# Patient Record
Sex: MALE | Race: WHITE | NOT HISPANIC OR LATINO | Employment: FULL TIME | URBAN - METROPOLITAN AREA
[De-identification: names, ages, dates, MRNs, and addresses within clinical notes are randomized per-mention and may not be internally consistent; named-entity substitution may affect disease eponyms.]

---

## 2017-02-23 ENCOUNTER — ALLSCRIPTS OFFICE VISIT (OUTPATIENT)
Dept: OTHER | Facility: OTHER | Age: 62
End: 2017-02-23

## 2017-03-13 ENCOUNTER — APPOINTMENT (EMERGENCY)
Dept: RADIOLOGY | Facility: HOSPITAL | Age: 62
End: 2017-03-13
Payer: COMMERCIAL

## 2017-03-13 ENCOUNTER — HOSPITAL ENCOUNTER (EMERGENCY)
Facility: HOSPITAL | Age: 62
Discharge: HOME | End: 2017-03-13
Attending: EMERGENCY MEDICINE | Admitting: EMERGENCY MEDICINE
Payer: COMMERCIAL

## 2017-03-13 VITALS
SYSTOLIC BLOOD PRESSURE: 120 MMHG | HEART RATE: 74 BPM | TEMPERATURE: 98.3 F | WEIGHT: 282 LBS | OXYGEN SATURATION: 99 % | RESPIRATION RATE: 16 BRPM | DIASTOLIC BLOOD PRESSURE: 71 MMHG

## 2017-03-13 DIAGNOSIS — R55 SYNCOPE: Primary | ICD-10-CM

## 2017-03-13 DIAGNOSIS — R42 LIGHTHEADEDNESS: ICD-10-CM

## 2017-03-13 LAB
ANION GAP SERPL CALCULATED.3IONS-SCNC: 7 MMOL/L (ref 4–13)
ATRIAL RATE: 74 BPM
BASOPHILS # BLD AUTO: 0.05 THOUSANDS/ΜL (ref 0–0.1)
BASOPHILS NFR BLD AUTO: 1 % (ref 0–1)
BUN SERPL-MCNC: 10 MG/DL (ref 5–25)
CALCIUM SERPL-MCNC: 9.2 MG/DL (ref 8.3–10.1)
CHLORIDE SERPL-SCNC: 104 MMOL/L (ref 100–108)
CO2 SERPL-SCNC: 31 MMOL/L (ref 21–32)
CREAT SERPL-MCNC: 0.73 MG/DL (ref 0.6–1.3)
EOSINOPHIL # BLD AUTO: 0.31 THOUSAND/ΜL (ref 0–0.61)
EOSINOPHIL NFR BLD AUTO: 5 % (ref 0–6)
ERYTHROCYTE [DISTWIDTH] IN BLOOD BY AUTOMATED COUNT: 13.1 % (ref 11.6–15.1)
GFR SERPL CREATININE-BSD FRML MDRD: >60 ML/MIN/1.73SQ M
GLUCOSE SERPL-MCNC: 115 MG/DL (ref 65–140)
HCT VFR BLD AUTO: 44.2 % (ref 36.5–49.3)
HGB BLD-MCNC: 15.3 G/DL (ref 12–17)
HOLD SPECIMEN: NORMAL
LYMPHOCYTES # BLD AUTO: 2.92 THOUSANDS/ΜL (ref 0.6–4.47)
LYMPHOCYTES NFR BLD AUTO: 47 % (ref 14–44)
MCH RBC QN AUTO: 31.5 PG (ref 26.8–34.3)
MCHC RBC AUTO-ENTMCNC: 34.6 G/DL (ref 31.4–37.4)
MCV RBC AUTO: 91 FL (ref 82–98)
MONOCYTES # BLD AUTO: 0.44 THOUSAND/ΜL (ref 0.17–1.22)
MONOCYTES NFR BLD AUTO: 7 % (ref 4–12)
NEUTROPHILS # BLD AUTO: 2.48 THOUSANDS/ΜL (ref 1.85–7.62)
NEUTS SEG NFR BLD AUTO: 40 % (ref 43–75)
NRBC BLD AUTO-RTO: 0 /100 WBCS
P AXIS: 47 DEGREES
PLATELET # BLD AUTO: 202 THOUSANDS/UL (ref 149–390)
PMV BLD AUTO: 10.9 FL (ref 8.9–12.7)
POTASSIUM SERPL-SCNC: 3.8 MMOL/L (ref 3.5–5.3)
PR INTERVAL: 172 MS
QRS AXIS: 26 DEGREES
QRSD INTERVAL: 108 MS
QT INTERVAL: 384 MS
QTC INTERVAL: 426 MS
RBC # BLD AUTO: 4.85 MILLION/UL (ref 3.88–5.62)
SODIUM SERPL-SCNC: 142 MMOL/L (ref 136–145)
SPECIMEN SOURCE: NORMAL
SPECIMEN SOURCE: NORMAL
T WAVE AXIS: 35 DEGREES
TROPONIN I BLD-MCNC: 0.01 NG/ML (ref 0–0.08)
TROPONIN I BLD-MCNC: 0.01 NG/ML (ref 0–0.08)
VENTRICULAR RATE: 74 BPM
WBC # BLD AUTO: 6.21 THOUSAND/UL (ref 4.31–10.16)

## 2017-03-13 PROCEDURE — 80048 BASIC METABOLIC PNL TOTAL CA: CPT | Performed by: EMERGENCY MEDICINE

## 2017-03-13 PROCEDURE — 93005 ELECTROCARDIOGRAM TRACING: CPT | Performed by: EMERGENCY MEDICINE

## 2017-03-13 PROCEDURE — 84484 ASSAY OF TROPONIN QUANT: CPT

## 2017-03-13 PROCEDURE — 96360 HYDRATION IV INFUSION INIT: CPT

## 2017-03-13 PROCEDURE — 85025 COMPLETE CBC W/AUTO DIFF WBC: CPT | Performed by: EMERGENCY MEDICINE

## 2017-03-13 PROCEDURE — 99285 EMERGENCY DEPT VISIT HI MDM: CPT

## 2017-03-13 PROCEDURE — 96361 HYDRATE IV INFUSION ADD-ON: CPT

## 2017-03-13 PROCEDURE — 71020 HB CHEST X-RAY 2VW FRONTAL&LATL: CPT

## 2017-03-13 PROCEDURE — 70450 CT HEAD/BRAIN W/O DYE: CPT

## 2017-03-13 PROCEDURE — 36415 COLL VENOUS BLD VENIPUNCTURE: CPT | Performed by: EMERGENCY MEDICINE

## 2017-03-13 RX ORDER — MECLIZINE HYDROCHLORIDE 25 MG/1
25 TABLET ORAL ONCE
Status: COMPLETED | OUTPATIENT
Start: 2017-03-13 | End: 2017-03-13

## 2017-03-13 RX ORDER — AMLODIPINE BESYLATE 10 MG/1
5 TABLET ORAL DAILY
COMMUNITY
End: 2019-11-14 | Stop reason: HOSPADM

## 2017-03-13 RX ORDER — ACETAMINOPHEN 325 MG/1
650 TABLET ORAL ONCE
Status: COMPLETED | OUTPATIENT
Start: 2017-03-13 | End: 2017-03-13

## 2017-03-13 RX ORDER — TRAMADOL HYDROCHLORIDE 50 MG/1
50 TABLET ORAL DAILY
COMMUNITY
End: 2019-01-03 | Stop reason: ALTCHOICE

## 2017-03-13 RX ORDER — OXYCODONE HYDROCHLORIDE 5 MG/1
5 CAPSULE ORAL DAILY
COMMUNITY
End: 2019-01-03 | Stop reason: ALTCHOICE

## 2017-03-13 RX ORDER — FUROSEMIDE 20 MG/1
20 TABLET ORAL DAILY
COMMUNITY
End: 2019-01-13 | Stop reason: SDUPTHER

## 2017-03-13 RX ORDER — EZETIMIBE 10 MG/1
10 TABLET ORAL DAILY
COMMUNITY
End: 2018-10-19 | Stop reason: SDUPTHER

## 2017-03-13 RX ORDER — MELOXICAM 15 MG/1
15 TABLET ORAL DAILY
COMMUNITY
End: 2019-01-03 | Stop reason: ALTCHOICE

## 2017-03-13 RX ORDER — METOPROLOL SUCCINATE 50 MG/1
50 TABLET, EXTENDED RELEASE ORAL DAILY
COMMUNITY
End: 2018-05-22

## 2017-03-13 RX ORDER — VALSARTAN 320 MG/1
320 TABLET ORAL DAILY
COMMUNITY
End: 2018-05-26 | Stop reason: SDUPTHER

## 2017-03-13 RX ORDER — POTASSIUM CHLORIDE 750 MG/1
10 TABLET, FILM COATED, EXTENDED RELEASE ORAL DAILY
COMMUNITY
End: 2019-01-03 | Stop reason: SDUPTHER

## 2017-03-13 RX ORDER — MECLIZINE HYDROCHLORIDE 25 MG/1
25 TABLET ORAL 3 TIMES DAILY PRN
Qty: 30 TABLET | Refills: 0 | Status: SHIPPED | OUTPATIENT
Start: 2017-03-13 | End: 2019-01-03 | Stop reason: ALTCHOICE

## 2017-03-13 RX ORDER — METHOCARBAMOL 750 MG/1
750 TABLET, FILM COATED ORAL DAILY
COMMUNITY
End: 2019-01-03 | Stop reason: ALTCHOICE

## 2017-03-13 RX ADMIN — SODIUM CHLORIDE 1000 ML: 0.9 INJECTION, SOLUTION INTRAVENOUS at 08:29

## 2017-03-13 RX ADMIN — ACETAMINOPHEN 650 MG: 325 TABLET, FILM COATED ORAL at 12:21

## 2017-03-13 RX ADMIN — MECLIZINE HYDROCHLORIDE 25 MG: 25 TABLET ORAL at 09:13

## 2017-03-13 RX ADMIN — MECLIZINE HYDROCHLORIDE 25 MG: 25 TABLET ORAL at 10:53

## 2017-03-15 ENCOUNTER — TRANSCRIBE ORDERS (OUTPATIENT)
Dept: ADMINISTRATIVE | Facility: HOSPITAL | Age: 62
End: 2017-03-15

## 2017-03-15 ENCOUNTER — APPOINTMENT (OUTPATIENT)
Dept: LAB | Facility: CLINIC | Age: 62
End: 2017-03-15
Payer: COMMERCIAL

## 2017-03-15 ENCOUNTER — GENERIC CONVERSION - ENCOUNTER (OUTPATIENT)
Dept: OTHER | Facility: OTHER | Age: 62
End: 2017-03-15

## 2017-03-15 ENCOUNTER — ALLSCRIPTS OFFICE VISIT (OUTPATIENT)
Dept: OTHER | Facility: OTHER | Age: 62
End: 2017-03-15

## 2017-03-15 ENCOUNTER — HOSPITAL ENCOUNTER (OUTPATIENT)
Dept: NON INVASIVE DIAGNOSTICS | Facility: CLINIC | Age: 62
Discharge: HOME | End: 2017-03-15
Payer: COMMERCIAL

## 2017-03-15 DIAGNOSIS — Z91.041 RADIOGRAPHIC DYE ALLERGY STATUS: ICD-10-CM

## 2017-03-15 DIAGNOSIS — R09.89 OTHER SPECIFIED SYMPTOMS AND SIGNS INVOLVING THE CIRCULATORY AND RESPIRATORY SYSTEMS: ICD-10-CM

## 2017-03-15 DIAGNOSIS — R42 DIZZINESS AND GIDDINESS: ICD-10-CM

## 2017-03-15 DIAGNOSIS — R42 DIZZINESS AND GIDDINESS: Primary | ICD-10-CM

## 2017-03-15 LAB — BUN SERPL-MCNC: 9 MG/DL (ref 5–25)

## 2017-03-15 PROCEDURE — 36415 COLL VENOUS BLD VENIPUNCTURE: CPT

## 2017-03-15 PROCEDURE — 93880 EXTRACRANIAL BILAT STUDY: CPT

## 2017-03-15 PROCEDURE — 84520 ASSAY OF UREA NITROGEN: CPT

## 2017-03-27 ENCOUNTER — OFFICE VISIT (OUTPATIENT)
Dept: URGENT CARE | Age: 62
End: 2017-03-27
Payer: COMMERCIAL

## 2017-03-27 PROCEDURE — G0382 LEV 3 HOSP TYPE B ED VISIT: HCPCS

## 2017-04-03 ENCOUNTER — APPOINTMENT (OUTPATIENT)
Dept: LAB | Age: 62
End: 2017-04-03
Payer: COMMERCIAL

## 2017-04-03 ENCOUNTER — GENERIC CONVERSION - ENCOUNTER (OUTPATIENT)
Dept: OTHER | Facility: OTHER | Age: 62
End: 2017-04-03

## 2017-04-03 DIAGNOSIS — I10 ESSENTIAL (PRIMARY) HYPERTENSION: ICD-10-CM

## 2017-04-03 DIAGNOSIS — Z79.899 OTHER LONG TERM (CURRENT) DRUG THERAPY: ICD-10-CM

## 2017-04-03 LAB
ALBUMIN SERPL BCP-MCNC: 3.6 G/DL (ref 3.5–5)
ALP SERPL-CCNC: 83 U/L (ref 46–116)
ALT SERPL W P-5'-P-CCNC: 65 U/L (ref 12–78)
ANION GAP SERPL CALCULATED.3IONS-SCNC: 6 MMOL/L (ref 4–13)
AST SERPL W P-5'-P-CCNC: 40 U/L (ref 5–45)
BILIRUB SERPL-MCNC: 0.44 MG/DL (ref 0.2–1)
BUN SERPL-MCNC: 12 MG/DL (ref 5–25)
CALCIUM SERPL-MCNC: 9.5 MG/DL (ref 8.3–10.1)
CHLORIDE SERPL-SCNC: 105 MMOL/L (ref 100–108)
CO2 SERPL-SCNC: 30 MMOL/L (ref 21–32)
CREAT SERPL-MCNC: 0.72 MG/DL (ref 0.6–1.3)
EST. AVERAGE GLUCOSE BLD GHB EST-MCNC: 128 MG/DL
GFR SERPL CREATININE-BSD FRML MDRD: >60 ML/MIN/1.73SQ M
GLUCOSE SERPL-MCNC: 98 MG/DL (ref 65–140)
HBA1C MFR BLD: 6.1 % (ref 4.2–6.3)
POTASSIUM SERPL-SCNC: 3.5 MMOL/L (ref 3.5–5.3)
PROT SERPL-MCNC: 8 G/DL (ref 6.4–8.2)
SODIUM SERPL-SCNC: 141 MMOL/L (ref 136–145)

## 2017-04-03 PROCEDURE — 80053 COMPREHEN METABOLIC PANEL: CPT

## 2017-04-03 PROCEDURE — 83036 HEMOGLOBIN GLYCOSYLATED A1C: CPT

## 2017-04-03 PROCEDURE — 36415 COLL VENOUS BLD VENIPUNCTURE: CPT

## 2017-04-11 ENCOUNTER — GENERIC CONVERSION - ENCOUNTER (OUTPATIENT)
Dept: OTHER | Facility: OTHER | Age: 62
End: 2017-04-11

## 2017-04-21 ENCOUNTER — ALLSCRIPTS OFFICE VISIT (OUTPATIENT)
Dept: OTHER | Facility: OTHER | Age: 62
End: 2017-04-21

## 2017-04-23 DIAGNOSIS — Z79.899 OTHER LONG TERM (CURRENT) DRUG THERAPY: ICD-10-CM

## 2017-04-23 DIAGNOSIS — I10 ESSENTIAL (PRIMARY) HYPERTENSION: ICD-10-CM

## 2017-05-19 ENCOUNTER — GENERIC CONVERSION - ENCOUNTER (OUTPATIENT)
Dept: OTHER | Facility: OTHER | Age: 62
End: 2017-05-19

## 2017-06-01 ENCOUNTER — ALLSCRIPTS OFFICE VISIT (OUTPATIENT)
Dept: OTHER | Facility: OTHER | Age: 62
End: 2017-06-01

## 2017-06-20 ENCOUNTER — ALLSCRIPTS OFFICE VISIT (OUTPATIENT)
Dept: OTHER | Facility: OTHER | Age: 62
End: 2017-06-20

## 2017-07-06 ENCOUNTER — GENERIC CONVERSION - ENCOUNTER (OUTPATIENT)
Dept: OTHER | Facility: OTHER | Age: 62
End: 2017-07-06

## 2017-07-07 ENCOUNTER — ALLSCRIPTS OFFICE VISIT (OUTPATIENT)
Dept: OTHER | Facility: OTHER | Age: 62
End: 2017-07-07

## 2017-07-13 ENCOUNTER — ALLSCRIPTS OFFICE VISIT (OUTPATIENT)
Dept: OTHER | Facility: OTHER | Age: 62
End: 2017-07-13

## 2017-08-23 ENCOUNTER — ALLSCRIPTS OFFICE VISIT (OUTPATIENT)
Dept: OTHER | Facility: OTHER | Age: 62
End: 2017-08-23

## 2017-10-24 NOTE — PROGRESS NOTES
Assessment  1  Hyperlipidemia (272 4) (E78 5)   2  Benign essential hypertension (401 1) (I10)   3  Morbid obesity (278 01) (E66 01)   4  Prediabetes (790 29) (R73 03)   5  Screen for colon cancer (V76 51) (Z12 11)    Assessment and plan #1 hyperlipidemia recommend a low-cholesterol diet will check a lipid profile #2 hypertension well-controlled continue current medical regimen will continue monitor #3 morbid obesity I would like the patient vomited downside would like the patient exercises routinely I have recommended that he continue to lose weight he is currently using the Nutrisystem which is very helpful for the patient and he is tolerating it well  #4 prediabetes recommended reducing carbohydrates and sweets in her diet, we'll check hemoglobin A1c, fasting sugar and February 2018 #5 colon cancer screening will have the patient see colon rectal Dr Tami Weber for a screening colonoscopy #7 recommend a flu vaccine in the fall RTO in 6 months call if any problems  Plan   Benign essential hypertension, Prediabetes, Screen for colon cancer    · (1) LIPID PANEL, FASTING; Status:Active; Requested for:89Gvr9892;   Need for tetanus booster, Puncture wound of skin from metal nail    · Td  PMH: Mild intermittent asthma without complication    · ProAir  (90 Base) MCG/ACT Inhalation Aerosol Solution; INHALE 2 PUFFS EVERY 4  HOURS AS NEEDED FOR COUGH AND WHEEZE  Prediabetes, Screen for colon cancer    · (1) COMPREHENSIVE METABOLIC PANEL; Status:Active; Requested for:96Awn6007;    · (1) HEMOGLOBIN A1C; Status:Active; Requested for:00Svh3714;     2 - Melquiades Verdugo MD, [facility]  Colorectal Surgery, Gastroenterology Co-Management  *  Status: Hold For - Scheduling  Requested for: 51Mie4357  Ordered;    For: Prediabetes, Screen for colon cancer;  Ordered By: Shannon Armstrong  Performed:   Due: 33RUG2632     Chief Complaint  Chief Complaint Chronic Condition [facility]: Patient is here today for follow up of chronic conditions described in HPI  History of Present Illness  HPI: 27-year-old male who is coming in for a follow-up examination hyperlipidemia, hypertension, obesity, prediabetes; the patient reports me no hospitalizations, no ER visits patient reports me he is compliant taking his medications without untoward side effects patient does need refill of his pro-air  Overall the patient is feeling well is trying to follow a healthy and balanced diet the pt reports he is on nutri system and last 10 lbs no cp takes the med ; 4 week ago pulled muscle of the the chest wall no further sx ; no swimming he is stretching and he is walking      Review of Systems  Complete-Male:   Constitutional: No fever or chills, feels well, no tiredness, no recent weight gain or weight loss  Cardiovascular: No complaints of slow heart rate, no fast heart rate, no chest pain, no palpitations, no leg claudication, no lower extremity  Respiratory: No complaints of shortness of breath, no wheezing, no cough, no SOB on exertion, no orthopnea or PND  Gastrointestinal: No complaints of abdominal pain, no constipation, no nausea or vomiting, no diarrhea or bloody stools  Genitourinary: No complaints of dysuria, no incontinence, no hesitancy, no nocturia, no genital lesion, no testicular pain  ROS Reviewed:   ROS reviewed  Active Problems  1  Abnormal gait (781 2) (R26 9)   2  Abnormal liver enzymes (790 5) (R74 8)   3  Acute maxillary sinusitis (461 0) (J01 00)   4  Acute sinusitis (461 9) (J01 90)   5  Allergic rhinitis (477 9) (J30 9)   6  Arm bruise (923 9) (S40 029A)   7  Arthralgia of knee (719 46) (M25 569)   8  Benign essential hypertension (401 1) (I10)   9  Carotid bruit (785 9) (R09 89)   10  Chronic pain of left knee (505 47,175 96) (M25 562,G89 29)   11  Contrast media allergy (V15 08) (Z91 041)   12  Coronary artery disease (414 00) (I25 10)   13  Cough (786 2) (R05)   14  Dizziness (780 4) (R42)   15  Edema (782 3) (R60 9)   16  Elevated ALT measurement (790 4) (R74 0)   17  Hemorrhoid (455 6) (K64 9)   18  Hepatic steatosis (571 8) (K76 0)   19  Horseshoe kidney (753 3) (Q63 1)   20  Hyperlipidemia (272 4) (E78 5)   21  Indigestion (536 8) (K30)   22  Knee pain, bilateral (719 46) (M25 561,M25 562)   23  Laboratory examination ordered as part of a complete physical examination (V72 62) (Z00 00)   24  Long term use of drug (V58 69) (Z79 899)   25  Microscopic hematuria (599 72) (R31 29)   26  Morbid obesity (278 01) (E66 01)   27  Need for influenza vaccination (V04 81) (Z23)   28  Obesity (278 00) (E66 9)   29  Obstructive sleep apnea (327 23) (G47 33)   30  Prediabetes (790 29) (R73 03)   31  Right-sided lacunar infarction (434 91) (I63 9)   32  Screen for colon cancer (V76 51) (Z12 11)   33  Skin tag (701 9) (L91 8)    Past Medical History  1  History of Allergic rhinitis due to pollen (477 0) (J30 1)   2  History of dizziness (V13 89) (Z87 898)   3  History of vertigo (V12 49) (Z87 898)   4  History of Mild intermittent asthma without complication (882 59) (P55 48)   5  History of Positive PPD (795 51) (R76 11)   6  History of Prediabetes (790 29) (R73 03)  Active Problems And Past Medical History Reviewed: The active problems and past medical history were reviewed and updated today  Surgical History  1  History of Hernia Repair   2  History of Total Knee Replacement Left  Surgical History Reviewed: The surgical history was reviewed and updated today  Family History  Father    1  Family history of Coronary artery disease   2  Family history of cardiac disorder (V17 49) (Z82 49)   3  Family history of hypertension (V17 49) (Z82 49)  Family History Reviewed: The family history was reviewed and updated today  Social History   ·    · Never smoked tobacco (V49 89) (Z78 9)   · No alcohol use   · No drug use   · Two children  Social History Reviewed: The social history was reviewed and updated today   The social history was reviewed and is unchanged  Current Meds   1  AmLODIPine Besylate 10 MG Oral Tablet; take 1 tablet by mouth every day; Therapy: 67VUX6377 to (Evaluate:14Jan2018)  Requested for: 77EKM9958; Last Rx:18Jun2017   Ordered   2  Aspirin 81 MG TABS; TAKS 2 TABLET DAILY; Therapy: (Recorded:57Atx9996) to Recorded   3  Clopidogrel Bisulfate 75 MG Oral Tablet; TAKE 1 TABLET DAILY; Therapy: 21Apr2017 to (Evaluate:18Oct2017)  Requested for: 87Rth1641; Last Rx:21Apr2017   Ordered   4  CoQ-10 100 MG Oral Capsule; takes 1 tablet daily; Therapy: (Recorded:88Lcp8380) to Recorded   5  Fluticasone Propionate 50 MCG/ACT Nasal Suspension; USE 2 SPRAYS IN EACH NOSTRIL ONCE   DAILY; Therapy: 53RIJ6263 to (Evaluate:19Feb2018)  Requested for: 83Gdv4281 Recorded   6  Furosemide 20 MG Oral Tablet; TAKE 1 TABLET DAILY; Therapy: (Recorded:61Ksb3799) to Recorded   7  Klor-Con M10 10 MEQ Oral Tablet Extended Release; TAKE 1 TABLET DAILY; Therapy: (Recorded:45Ksn1916) to Recorded   8  Meclizine HCl TABS; Therapy: (Recorded:27Mar2017) to Recorded   9  Meloxicam 15 MG Oral Tablet; take 1 tablet daily as needed; Therapy: 51LUY9335 to (Evaluate:48Zbz9235)  Requested for: 05FBQ5792; Last Rx:95Rdd2200   Ordered   10  MethylPREDNISolone 4 MG Oral Tablet Therapy Pack; as directed; Therapy: 61DOJ9187 to (Evaluate:38Dku9713)  Requested for: 71WNV8623; Last Rx:44Yha8553 Ordered   11  Metoprolol Succinate ER 50 MG Oral Tablet Extended Release 24 Hour; Take 1 tablet daily; Therapy: 69QWV6511 to (Evaluate:45Xlx4440)  Requested for: 35Kah5938; Last Rx:36Zwe9584    Ordered   12  Pravastatin Sodium 40 MG Oral Tablet; TAKE 1 TABLET DAILY; Therapy: 21Apr2017 to (Evaluate:18Oct2017)  Requested for: 99XBC2098; Last Rx:21Apr2017    Ordered   13   ProAir  (90 Base) MCG/ACT Inhalation Aerosol Solution; INHALE 2 PUFFS EVERY 4 HOURS    AS NEEDED FOR COUGH AND WHEEZE;    Therapy: 11FGY7465 to (Evaluate:76Zyi0818)  Requested for: 48XRH9219; Last Rx:55Tpa2531    Ordered   14  Valsartan 320 MG Oral Tablet; Take 1 tablet daily; Therapy: 94Ypw8221 to (Evaluate:17Nov2017)  Requested for: 75CJA2693; Last Rx:11Trl4509    Ordered   15  Zetia 10 MG Oral Tablet; TAKE 1 TABLET DAILY; Therapy: 63Lsj4523 to (Clayborn Lime)  Requested for: 06UOL9025; Last Rx:44Lja8404    Ordered  Medication List Reviewed: The medication list was reviewed and updated today  Allergies  1  Erythromycin Stearate TABS   2  Lisinopril TABS  3  IV Dye    Vitals  Vital Signs    Recorded: 23Aug2017 04:57PM   Heart Rate 66   Respiration 16   Systolic 506, LUE, Sitting   Diastolic 78, LUE, Sitting   Height 5 ft 5 in   Weight 284 lb 0 2 oz   BMI Calculated 47 26   BSA Calculated 2 3   O2 Saturation 96     Physical Exam    Constitutional   General appearance: No acute distress, well appearing and well nourished  Eyes   Conjunctiva and lids: No swelling, erythema, or discharge  Pupils and irises: Equal, round and reactive to light  Ears, Nose, Mouth, and Throat   External inspection of ears and nose: Normal     Otoscopic examination: Tympanic membrance translucent with normal light reflex  Canals patent without erythema  Nasal mucosa, septum, and turbinates: Normal without edema or erythema  Oropharynx: Normal with no erythema, edema, exudate or lesions  Pulmonary   Respiratory effort: No increased work of breathing or signs of respiratory distress  Auscultation of lungs: Clear to auscultation, equal breath sounds bilaterally, no wheezes, no rales, no rhonci  Cardiovascular   Auscultation of heart: Normal rate and rhythm, normal S1 and S2, without murmurs  Examination of extremities for edema and/or varicosities: Normal     Psychiatric   Mood and affect: Normal          Future Appointments    Date/Time Provider Specialty Site   06/01/2018 09:00 AM NEYDA Belle   Neurology [facility] NEUR[facility]Y [facility]  Mitali Robles   02/28/2018 04:30 PM Yan Yang DO Internal Medicine [facility]     Signatures   Electronically signed by : Lissa Rahman DO; Aug 27 2017 11:03AM EST                       (Author)

## 2017-11-28 ENCOUNTER — GENERIC CONVERSION - ENCOUNTER (OUTPATIENT)
Dept: OTHER | Facility: OTHER | Age: 62
End: 2017-11-28

## 2018-01-10 NOTE — RESULT NOTES
Message   notify the patient - quantiferon gold test follow up as scheduled        Verified Results  (Q) QUANTIFERON(R)-TB [facility] INCUBATED) [id number] 08:20AM Nikki Mendoza   REPORT COMMENT:  SPLIT 01/21/2016 FROM [id number]  FASTING:NO     Test Name Result Flag Reference   QUANTIFERON(R)-TB GOLD,$(INCUBATED) NEGATIVE  NEGATIVE   Negative test result  M  tuberculosis complex   infection unlikely  NIL 0 05 IU/mL     MITOGEN-NIL >10 00 IU/mL     TB-NIL <0 00 IU/mL     The Nil tube value is used to determine if the patient  has a preexisting immune response which could cause a   false-positive reading on the test  In order for a   test to be valid, the Nil tube must have a value of   less than or equal to 8 0 IU/mL  The mitogen control tube is used to assure the patient   has a healthy immune status and also serves as a   control for correct blood handling and incubation  It   is used to detect false-negative readings  The mitogen   tube must have a gamma interferon value of greater   than or equal to 0 5 IU/mL higher than the value of   the Nil tube  The TB antigen tube is coated with the M  tuberculosis  specific antigens  For a test to be considered   positive, the TB antigen tube value minus the Nil tube   value must be greater than or equal to 0 35 IU/mL  For additional information, please refer to   http://education  [employer]/faq/QFT  (This link is being provided for informational/  educational purposes only )       Signatures   Electronically signed by : Adrien Montez DO; Jan 25 2016  5:22PM EST                       (Author)

## 2018-01-11 NOTE — RESULT NOTES
Message   notify the pt normal BUN f/u as scheduled        Verified Results  (1) BUN [id number] 03:28PM Author Credit Order Number: [id number]     Test Name Result Flag Reference   BLOOD UREA NITROGEN 9 mg/dL  5-25     VAS CAROTID COMPLETE STUDY [id number] 01:26PM Author Credit Order Number: [id number]    - Patient Instructions: To schedule this appointment, please contact Central Scheduling at [phone]  Test Name Result Flag Reference   VAS CAROTID COMPLETE STUDY (Report)     [facility] REPORT   CLINICAL:   Indications: Bruit [R09 89  Syncopal episode  Risk Factors   The patient has history of obesity, HTN and pulmonary status  He has no   history of Diabetes or hyperlipidemia  Clinical   Right Brachial Pressure: 120/70 mm Hg, Left Brachial Pressure: 130/76 mm Hg  FINDINGS:      Right    Impression PSV EDV (cm/s) Direction of Flow Ratio    Dist  ICA         36     14           0 29    Mid  ICA         45     15           0 37    Prox  ICA  1 - 49%   65     12           0 53    Dist CCA         99     19                 Mid CCA         123     24           1 25    Prox CCA         99     21           1 17    Ext Carotid        70      8           0 57    Prox Vert         25      7 Antegrade            Subclavian        141      0                 Innominate        85     12                    Left     Impression PSV EDV (cm/s) Direction of Flow Ratio    Dist  ICA         45     15           0 41    Mid  ICA         62     20           0 56    Prox   ICA  1 - 49%   55     13           0 50    Dist CCA         92     14                 Mid CCA         110     20           1 36    Prox CCA         81     14                 Ext Carotid        66      9           0 60    Prox Vert         49     13 Antegrade            Subclavian        98      0                          CONCLUSION:      Impression   RIGHT:   There is <50% stenosis noted in the internal carotid artery  Plaque is   heterogenous and irregular  Vertebral artery flow is antegrade  There is no significant subclavian artery   disease  LEFT:   There is <50% stenosis noted in the internal carotid artery  Plaque is   heterogenous and smooth  Vertebral artery flow is antegrade  There is no significant subclavian artery   disease  Dr Iman Qureshi office called with results and the patient was instructed to   return home  Internal carotid artery stenosis determination by consensus criteria from:   Chloe Chery , et al  Carotid Artery Stenosis: Gray-Scale and Doppler US Diagnosis   - Society of Radiologists in [address] [place] [address], Radiology 2003;   [phone]        SIGNATURE:   Electronically Signed by: Sunshine Briscoe on 2017-03-15 04:30:37 PM       Signatures   Electronically signed by : Rubin Gould DO; Mar 15 2017  8:21PM EST                       (Author)

## 2018-01-12 VITALS
RESPIRATION RATE: 18 BRPM | TEMPERATURE: 97.7 F | BODY MASS INDEX: 47.82 KG/M2 | HEART RATE: 84 BPM | WEIGHT: 287 LBS | SYSTOLIC BLOOD PRESSURE: 132 MMHG | HEIGHT: 65 IN | DIASTOLIC BLOOD PRESSURE: 74 MMHG

## 2018-01-12 VITALS
WEIGHT: 294.04 LBS | DIASTOLIC BLOOD PRESSURE: 80 MMHG | SYSTOLIC BLOOD PRESSURE: 120 MMHG | HEART RATE: 85 BPM | BODY MASS INDEX: 48.99 KG/M2 | RESPIRATION RATE: 18 BRPM | TEMPERATURE: 97.7 F | OXYGEN SATURATION: 94 % | HEIGHT: 65 IN

## 2018-01-12 NOTE — MISCELLANEOUS
Message   Recorded as Task   Date: 07/08/2016 11:19 AM, Created By: Suzi Jacome   Task Name: Call Back   Assigned To: Servando Vera   Regarding Patient: Katlyn Lentz, Status: Active   Comment:    AshtonRoman davidson - 08 Jul 2016 11:19 AM     TASK CREATED  PT IS COUGHING AND GAGGING AND SAYS HE HAD THIS ABOUT A MONTH AGO AND WAS PRESCRIBED AMOXICILIN-POT CLAVULANATE 875-125 MG FOR HAVING THE SAME SYMPTOMS  HE CLAIMS THE SINUSITIS CAME BACK AND HE WOULD LIKE THE SAME PRESCRIPTION AGAIN  HE ALSO REQUESTS A CALLBACK -[phone]   #1  I spoke with the patient on the evening of July 8, 2016  #2  He notes more than a week of sinus congestion and pain with a postnasal drip  He denies fever, chills, and body aches  He denies chest congestion and shortness of breath  His symptoms are not improving  #3  I will prescribe Augmentin 875 mg tablets 1 twice daily for 10 days for a sinus infection  #4  I advised him to call back to the office, if he does not get better  He states that he has an office visit scheduled for next week  Plan  Acute sinusitis    · Amoxicillin-Pot Clavulanate 875-125 MG Oral Tablet; TAKE 1 TABLET TWICE  DAILY WITH FOOD, UNTIL FINISHED   · Follow Up if Not Better Evaluation and Treatment  Follow-up  Status: Complete  Done:  01YBP3174    Signatures   Electronically signed by :  Reg Davis MD; Jul 9 2016  9:24AM EST                       (Author)

## 2018-01-12 NOTE — RESULT NOTES
Message   notify the pt normal comprehensive metabolic panel f/u as scheduled        Verified Results  (1) COMPREHENSIVE METABOLIC PANEL [id number] 47:48SY Yue Other Order Number: [id number]     Test Name Result Flag Reference   GLUCOSE,RANDM 98 mg/dL     If the patient is fasting, the ADA then defines impaired fasting glucose as > 100 mg/dL and diabetes as > or equal to 123 mg/dL  SODIUM 141 mmol/L  136-145   POTASSIUM 3 5 mmol/L  3 5-5 3   CHLORIDE 105 mmol/L  100-108   CARBON DIOXIDE 30 mmol/L  21-32   ANION GAP (CALC) 6 mmol/L  4-13   BLOOD UREA NITROGEN 12 mg/dL  5-25   CREATININE 0 72 mg/dL  0 60-1 30   Standardized to IDMS reference method   CALCIUM 9 5 mg/dL  8 3-10 1   BILI, TOTAL 0 44 mg/dL  0 20-1 00   ALK PHOSPHATAS 83 U/L     ALT (SGPT) 65 U/L  12-78   AST(SGOT) 40 U/L  5-45   ALBUMIN 3 6 g/dL  3 5-5 0   TOTAL PROTEIN 8 0 g/dL  6 4-8 2   eGFR Non-African American      >60 0 ml/min/1 73sq m   [facility] Disease Education Program recommendations are as follows:  GFR calculation is accurate only with a steady state creatinine  Chronic Kidney disease less than 60 ml/min/1 73 sq  meters  Kidney failure less than 15 ml/min/1 73 sq  meters  Plan  Dizziness    · (1) CREATININE; Status:Active; Requested for:03Apr2017;     Signatures   Electronically signed by : Rubin Gould DO;  Apr 4 2017  5:52PM EST                       (Author)

## 2018-01-13 VITALS
WEIGHT: 294 LBS | SYSTOLIC BLOOD PRESSURE: 132 MMHG | OXYGEN SATURATION: 96 % | DIASTOLIC BLOOD PRESSURE: 76 MMHG | BODY MASS INDEX: 48.92 KG/M2 | HEART RATE: 70 BPM

## 2018-01-13 VITALS
HEIGHT: 65 IN | RESPIRATION RATE: 16 BRPM | DIASTOLIC BLOOD PRESSURE: 78 MMHG | OXYGEN SATURATION: 96 % | BODY MASS INDEX: 47.32 KG/M2 | HEART RATE: 66 BPM | SYSTOLIC BLOOD PRESSURE: 130 MMHG | WEIGHT: 284.01 LBS

## 2018-01-14 VITALS
HEART RATE: 90 BPM | DIASTOLIC BLOOD PRESSURE: 72 MMHG | WEIGHT: 294.02 LBS | HEIGHT: 65 IN | RESPIRATION RATE: 18 BRPM | OXYGEN SATURATION: 95 % | SYSTOLIC BLOOD PRESSURE: 126 MMHG | BODY MASS INDEX: 48.99 KG/M2

## 2018-01-14 VITALS
SYSTOLIC BLOOD PRESSURE: 124 MMHG | HEART RATE: 78 BPM | HEIGHT: 65 IN | WEIGHT: 292.25 LBS | BODY MASS INDEX: 48.69 KG/M2 | DIASTOLIC BLOOD PRESSURE: 72 MMHG | RESPIRATION RATE: 18 BRPM

## 2018-01-14 VITALS
BODY MASS INDEX: 48.82 KG/M2 | RESPIRATION RATE: 18 BRPM | SYSTOLIC BLOOD PRESSURE: 104 MMHG | WEIGHT: 293.02 LBS | HEIGHT: 65 IN | HEART RATE: 84 BPM | DIASTOLIC BLOOD PRESSURE: 68 MMHG

## 2018-01-14 NOTE — RESULT NOTES
Message   notify the patient increasing liver function test please have the patient see GI Dr Jerman Wells follow-up as scheduled        Verified Results  (1) ALT (SGPT) 30QCB6373 08:59AM Malini Webber   REPORT COMMENT:  FASTING:NO  SPECIALIZED COLLECTION  PATIENT REFERRED TO ALTERNATE SITE       Test Name Result Flag Reference   ALT 56 U/L H 9-46       Signatures   Electronically signed by : Trudi Prader, DO; Jan 22 2016  4:24PM EST                       (Author)

## 2018-01-16 NOTE — MISCELLANEOUS
Message   Recorded as Task   Date: 08/05/2016 09:30 AM, Created By: Gilda Maynard   Task Name: Medical Complaint Callback   Assigned To: Donte Lemons   Regarding Patient: Annette Thomas, Status: Active   Comment:    Gilda Maynard - 05 Aug 2016 9:30 AM     TASK CREATED  Poli Ramsey from [facility] calling to inform the pt will be leaving rehab after having the knee replacement but  home care will be following up with the pt, if needed, Poli Ramsey [email or web address] [facility] Aug 2016 12:56 PM     TASK EDITED  Magan Foley - CTN        Active Problems    1  Abnormal liver enzymes (790 5) (R74 8)   2  Acute sinusitis (461 9) (J01 90)   3  Arthralgia of knee (719 46) (M25 569)   4  Benign essential hypertension (401 1) (I10)   5  Chronic pain of left knee (016 84,943 73) (M25 562,G89 29)   6  Edema (782 3) (R60 9)   7  Elevated ALT measurement (790 4) (R74 0)   8  Hemorrhoid (455 6) (K64 9)   9  Hepatic steatosis (571 8) (K76 0)   10  Horseshoe kidney (753 3) (Q63 1)   11  Hyperlipidemia (272 4) (E78 5)   12  Indigestion (536 8) (K30)   13  Knee pain, bilateral (719 46) (M25 561,M25 562)   14  Laboratory examination ordered as part of a complete physical examination (V72 62)    (Z00 00)   15  Long term use of drug (V58 69) (Z79 899)   16  Microscopic hematuria (599 72) (R31 2)   17  Morbid obesity (278 01) (E66 01)   18  Obstructive sleep apnea (327 23) (G47 33)    Current Meds   1  AmLODIPine Besylate 5 MG Oral Tablet; TAKE 1 TABLET DAILY; Therapy: (Recorded:96Emh6150) to Recorded   2  Amoxicillin-Pot Clavulanate 875-125 MG Oral Tablet; TAKE 1 TABLET TWICE DAILY   WITH FOOD, UNTIL FINISHED; Therapy: 40CMH5500 to (Evaluate:46Qyt9756)  Requested for: 18ETM9161; Last   Rx:58Ual9417 Ordered   3  Aspirin 81 MG TABS; TAKS 2 TABLET DAILY; Therapy: (Recorded:83Nsv5639) to Recorded   4  CoQ-10 100 MG Oral Capsule; takes 1 tablet daily; Therapy: (Recorded:63Odk9088) to Recorded   5   Fluticasone Propionate 50 MCG/ACT Nasal Suspension; USE 2 SPRAYS IN EACH   NOSTRIL ONCE DAILY; Therapy: 39GVD2981 to (Last [facility])  Requested for: 43RVC9896 Ordered   6  Furosemide 20 MG Oral Tablet; TAKE 1 TABLET DAILY; Therapy: (Recorded:27Ftj3968) to Recorded   7  Klor-Con M10 10 MEQ Oral Tablet Extended Release; TAKE 1 TABLET DAILY; Therapy: (Recorded:86Uqa3289) to Recorded   8  Metoprolol Succinate ER 50 MG Oral Tablet Extended Release 24 Hour; Take 1 tablet   daily; Therapy: 70SNA8323 to (Evaluate:22Aug2016)  Requested for: 45Irf6263; Last   Rx:58Uqo6021; Status: ACTIVE - Renewal Denied Ordered   9  ProAir  (90 Base) MCG/ACT Inhalation Aerosol Solution; INHALE 2 PUFFS   EVERY 4 HOURS AS NEEDED FOR COUGH AND WHEEZE;   Therapy: 25FPF6117 to (Evaluate:24Jan2016)  Requested for: 64Lds5431; Last   Rx:53Qlr8468 Ordered   10  Valsartan 320 MG Oral Tablet; Take 1 tablet daily; Therapy: 09Qql2089 to (Evaluate:12Nov2016)  Requested for: 66IFI0820; Last    Rx:11Xme8740; Status: ACTIVE - Renewal Denied Ordered   11  Zetia 10 MG Oral Tablet; TAKE 1 TABLET DAILY; Therapy: 44Txo0675 to (Urszula Mar)  Requested for: 90AVU1722; Last    Rx:18Ijw7960 Ordered    Allergies    1  Erythromycin Stearate TABS   2  Lisinopril TABS    3  IV Dye    Signatures   Electronically signed by :  Elie Ferreira MD; Aug  5 2016 12:57PM EST                       (Author)

## 2018-01-17 NOTE — RESULT NOTES
Message   notify the pt the Hemoglobin A1c is in the prediabetic range - 6 1 please have the pt reduce sweets and carbohydrates f/u as scheduled        Verified Results  (1) COMPREHENSIVE METABOLIC PANEL 84SXF3747 92:89QV Erwinbritt Askews    Order Number: [id number]     Test Name Result Flag Reference   GLUCOSE,RANDM 98 mg/dL     If the patient is fasting, the ADA then defines impaired fasting glucose as > 100 mg/dL and diabetes as > or equal to 123 mg/dL  SODIUM 141 mmol/L  136-145   POTASSIUM 3 5 mmol/L  3 5-5 3   CHLORIDE 105 mmol/L  100-108   CARBON DIOXIDE 30 mmol/L  21-32   ANION GAP (CALC) 6 mmol/L  4-13   BLOOD UREA NITROGEN 12 mg/dL  5-25   CREATININE 0 72 mg/dL  0 60-1 30   Standardized to IDMS reference method   CALCIUM 9 5 mg/dL  8 3-10 1   BILI, TOTAL 0 44 mg/dL  0 20-1 00   ALK PHOSPHATAS 83 U/L     ALT (SGPT) 65 U/L  12-78   AST(SGOT) 40 U/L  5-45   ALBUMIN 3 6 g/dL  3 5-5 0   TOTAL PROTEIN 8 0 g/dL  6 4-8 2   eGFR Non-African American      >60 0 ml/min/1 73sq m   [facility] Disease Education Program recommendations are as follows:  GFR calculation is accurate only with a steady state creatinine  Chronic Kidney disease less than 60 ml/min/1 73 sq  meters  Kidney failure less than 15 ml/min/1 73 sq  meters  (1) HEMOGLOBIN A1C 03Apr2017 03:50PM Erwin OVERTON Order Number: [id number]     Test Name Result Flag Reference   HEMOGLOBIN A1C 6 1 %  4 2-6 3   EST  AVG  GLUCOSE 128 mg/dl         Plan  Dizziness    · (1) CREATININE; Status:Active; Requested for:03Apr2017;     Signatures   Electronically signed by : Noelle Ying DO;  Apr 4 2017  5:53PM EST                       (Author)

## 2018-01-17 NOTE — RESULT NOTES
Message   Notify the patient the carotid Doppler less than 50% stenosis bilaterally please follow up as scheduled  (he will need repeat carotid doppler in 1 yr; only needs to be monitored at this point)        Verified Results  VAS CAROTID COMPLETE STUDY [id number] 01:26PM Kenzie Goss Order Number: [id number]    - Patient Instructions: To schedule this appointment, please contact Central Scheduling at [phone]  Test Name Result Flag Reference   VAS CAROTID COMPLETE STUDY (Report)     [facility] REPORT   CLINICAL:   Indications: Bruit [R09 89  Syncopal episode  Risk Factors   The patient has history of obesity, HTN and pulmonary status  He has no   history of Diabetes or hyperlipidemia  Clinical   Right Brachial Pressure: 120/70 mm Hg, Left Brachial Pressure: 130/76 mm Hg  FINDINGS:      Right    Impression PSV EDV (cm/s) Direction of Flow Ratio    Dist  ICA         36     14           0 29    Mid  ICA         45     15           0 37    Prox  ICA  1 - 49%   65     12           0 53    Dist CCA         99     19                 Mid CCA         123     24           1 25    Prox CCA         99     21           1 17    Ext Carotid        70      8           0 57    Prox Vert         25      7 Antegrade            Subclavian        141      0                 Innominate        85     12                    Left     Impression PSV EDV (cm/s) Direction of Flow Ratio    Dist  ICA         45     15           0 41    Mid  ICA         62     20           0 56    Prox  ICA  1 - 49%   55     13           0 50    Dist CCA         92     14                 Mid CCA         110     20           1 36    Prox CCA         81     14                 Ext Carotid        66      9           0 60    Prox Vert         49     13 Antegrade            Subclavian        98      0                          CONCLUSION:      Impression   RIGHT:   There is <50% stenosis noted in the internal carotid artery  Plaque is   heterogenous and irregular  Vertebral artery flow is antegrade  There is no significant subclavian artery   disease  LEFT:   There is <50% stenosis noted in the internal carotid artery  Plaque is   heterogenous and smooth  Vertebral artery flow is antegrade  There is no significant subclavian artery   disease  Dr Wiley Forman office called with results and the patient was instructed to   return home  Internal carotid artery stenosis determination by consensus criteria from:   Janna Pedroza et al  Carotid Artery Stenosis: Gray-Scale and Doppler US Diagnosis   - Society of [employer]iologists in [address] [place] [address], Radiology 2003;   [phone]        SIGNATURE:   Electronically Signed by: Dudley English on 2017-03-15 04:30:37 PM       Signatures   Electronically signed by : Demetri Walsh DO; Mar 15 2017  8:20PM EST                       (Author)

## 2018-02-23 DIAGNOSIS — Z12.11 ENCOUNTER FOR SCREENING FOR MALIGNANT NEOPLASM OF COLON: ICD-10-CM

## 2018-02-23 DIAGNOSIS — R73.03 PREDIABETES: ICD-10-CM

## 2018-02-23 DIAGNOSIS — I10 ESSENTIAL (PRIMARY) HYPERTENSION: ICD-10-CM

## 2018-02-26 RX ORDER — PRAVASTATIN SODIUM 40 MG
1 TABLET ORAL DAILY
COMMUNITY
Start: 2017-04-21 | End: 2019-01-03

## 2018-02-26 RX ORDER — FLUTICASONE PROPIONATE 50 MCG
2 SPRAY, SUSPENSION (ML) NASAL DAILY PRN
COMMUNITY
Start: 2012-03-01

## 2018-02-26 RX ORDER — CLOPIDOGREL BISULFATE 75 MG/1
TABLET ORAL
Refills: 11 | COMMUNITY
Start: 2018-01-28 | End: 2019-08-26

## 2018-02-26 RX ORDER — POTASSIUM CHLORIDE 750 MG/1
10 TABLET, EXTENDED RELEASE ORAL DAILY
Refills: 11 | COMMUNITY
Start: 2018-01-28 | End: 2019-05-10 | Stop reason: SDUPTHER

## 2018-02-26 RX ORDER — ALBUTEROL SULFATE 90 UG/1
2 AEROSOL, METERED RESPIRATORY (INHALATION) EVERY 4 HOURS PRN
COMMUNITY
Start: 2012-01-10 | End: 2019-01-23 | Stop reason: ALTCHOICE

## 2018-02-28 ENCOUNTER — OFFICE VISIT (OUTPATIENT)
Dept: INTERNAL MEDICINE CLINIC | Facility: CLINIC | Age: 63
End: 2018-02-28
Payer: COMMERCIAL

## 2018-02-28 ENCOUNTER — OFFICE VISIT (OUTPATIENT)
Dept: BEHAVIORAL/MENTAL HEALTH CLINIC | Facility: CLINIC | Age: 63
End: 2018-02-28
Payer: COMMERCIAL

## 2018-02-28 VITALS
OXYGEN SATURATION: 99 % | DIASTOLIC BLOOD PRESSURE: 62 MMHG | SYSTOLIC BLOOD PRESSURE: 112 MMHG | WEIGHT: 256.4 LBS | RESPIRATION RATE: 16 BRPM | HEIGHT: 63 IN | HEART RATE: 64 BPM | BODY MASS INDEX: 45.43 KG/M2

## 2018-02-28 DIAGNOSIS — I10 BENIGN ESSENTIAL HYPERTENSION: ICD-10-CM

## 2018-02-28 DIAGNOSIS — G47.33 OSA (OBSTRUCTIVE SLEEP APNEA): ICD-10-CM

## 2018-02-28 DIAGNOSIS — R73.03 PREDIABETES: ICD-10-CM

## 2018-02-28 DIAGNOSIS — E66.01 MORBID OBESITY (HCC): ICD-10-CM

## 2018-02-28 DIAGNOSIS — E78.5 HYPERLIPIDEMIA, UNSPECIFIED HYPERLIPIDEMIA TYPE: ICD-10-CM

## 2018-02-28 DIAGNOSIS — R68.89 COLD INTOLERANCE: Primary | ICD-10-CM

## 2018-02-28 DIAGNOSIS — K64.4 EXTERNAL HEMORRHOID: ICD-10-CM

## 2018-02-28 DIAGNOSIS — Z12.5 SCREENING PSA (PROSTATE SPECIFIC ANTIGEN): ICD-10-CM

## 2018-02-28 DIAGNOSIS — I63.81 RIGHT-SIDED LACUNAR INFARCTION (HCC): ICD-10-CM

## 2018-02-28 DIAGNOSIS — F43.9 STRESS: Primary | ICD-10-CM

## 2018-02-28 PROCEDURE — 90834 PSYTX W PT 45 MINUTES: CPT | Performed by: SOCIAL WORKER

## 2018-02-28 PROCEDURE — 99214 OFFICE O/P EST MOD 30 MIN: CPT | Performed by: INTERNAL MEDICINE

## 2018-02-28 RX ORDER — DIPHENOXYLATE HYDROCHLORIDE AND ATROPINE SULFATE 2.5; .025 MG/1; MG/1
1 TABLET ORAL
COMMUNITY

## 2018-02-28 RX ORDER — UBIDECARENONE 100 MG
100 CAPSULE ORAL
COMMUNITY
Start: 2017-11-28 | End: 2018-09-07 | Stop reason: SDUPTHER

## 2018-02-28 RX ORDER — SENNA AND DOCUSATE SODIUM 50; 8.6 MG/1; MG/1
1 TABLET, FILM COATED ORAL
COMMUNITY
Start: 2016-08-01 | End: 2019-01-03 | Stop reason: ALTCHOICE

## 2018-02-28 RX ORDER — RANITIDINE 150 MG/1
150 TABLET ORAL
COMMUNITY
Start: 2012-10-09 | End: 2019-11-13 | Stop reason: CLARIF

## 2018-02-28 RX ORDER — EZETIMIBE 10 MG/1
10 TABLET ORAL
COMMUNITY
Start: 2016-10-11 | End: 2018-09-07 | Stop reason: SDUPTHER

## 2018-02-28 RX ORDER — ROSUVASTATIN CALCIUM 5 MG/1
5 TABLET, COATED ORAL DAILY
Qty: 30 TABLET | Refills: 6 | Status: SHIPPED | OUTPATIENT
Start: 2018-02-28 | End: 2019-01-03

## 2018-02-28 RX ORDER — METOPROLOL SUCCINATE 50 MG/1
25 TABLET, EXTENDED RELEASE ORAL
COMMUNITY
Start: 2017-11-28 | End: 2018-05-22

## 2018-02-28 NOTE — PROGRESS NOTES
Assessment/Plan:         Diagnoses and all orders for this visit:    Cold intolerance  Comments: Will check 3rd generation TSH  Orders:  -     TSH, 3rd generation    VIRGEN (obstructive sleep apnea)  Comments:  Continue with CPAP, advised weight loss    Benign essential hypertension  Comments:  Hypertension - controlled, I have counseled patient following healthy imbalance diet, I would like the patient reduce sodium, exercise routinely, I would like t  Orders:  -     Comprehensive metabolic panel; Future  -     Lipid Panel with Direct LDL reflex; Future  -     Comprehensive metabolic panel; Future    Right-sided lacunar infarction [facility])  Comments:  Currently stable and doing well currently on Plavix/aspirin    Morbid obesity ([facility] [address] )  Comments:  Obesity -I have counseled patient following healthy and balanced diet, I would like the patient to lose weight, I would like the patient exercise routinely;  Orders:  -     Comprehensive metabolic panel; Future    Prediabetes  -     HEMOGLOBIN A1C W/ EAG ESTIMATION; Future  -     Comprehensive metabolic panel; Future    External hemorrhoid  Comments:  I will have the patient see colon rectal for excision, increase fiber in the diet  Orders:  -     Ambulatory referral to Colorectal Surgery; Future    Hyperlipidemia, unspecified hyperlipidemia type  Comments:  Hyperlipidemia controlled continue with current medical regiment recommend a low-cholesterol diet and recommend routine exercise we will continue to monitor   Orders:  -     rosuvastatin (CRESTOR) 5 mg tablet; Take 1 tablet (5 mg total) by mouth daily  -     Lipid panel; Future    Screening PSA (prostate specific antigen)  Comments: Will order a screening PSA  Orders:  -     PSA; Future    Other orders  -     multivitamin (THERAGRAN) TABS; Take 1 tablet by mouth  -     ranitidine (ZANTAC) 150 mg tablet; Take 150 mg by mouth  -     ezetimibe (ZETIA) 10 mg tablet;  Take 10 mg by mouth  -     metoprolol succinate (TOPROL-XL) 50 mg 24 hr tablet; Take 25 mg by mouth  -     Cholecalciferol 1000 UNIT/10ML LIQD; Take 1,000 Units by mouth  -     co-enzyme Q-10 100 mg capsule; Take 100 mg by mouth  -     senna-docusate sodium (SENOKOT-S) 8 6-50 mg per tablet; Take 1 tablet by mouth        Return to office six months  call if any problems  Subjective:      Patient ID: Karen Powers is a 60 y o  male  HPI 63-year old male coming in for a follow up visit regarding obstructive sleep apnea, benign essential hypertension, lacunar infarction, obesity, prediabetes, external hemorrhoids, hyperlipidemia and screening PSA  The patient reports me compliant taking medications without untoward side effects the  The patient is here to review his medical condition, update me on the medical condition and the patient reports me no hospitalizations and no ER visits  the pt is using the nutrisystem  He has lost 27 lbs  He mixing in regular food, he is swimming , he was over doing it   Several months ago bradycardia 48 HR pt called cardiology the reduced the metoprolol by 50 %  bp 120--130) he is now on compression stocking    The following portions of the patient's history were reviewed and updated as appropriate: allergies, current medications, past medical history, past social history, past surgical history and problem list     Review of Systems   Constitutional: Negative for activity change, appetite change, chills, fever and unexpected weight change  HENT: Negative for hearing loss and postnasal drip  Eyes: Negative for pain  Respiratory: Negative for cough and shortness of breath  Cardiovascular: Negative for chest pain  Gastrointestinal: Negative for abdominal distention, abdominal pain, diarrhea, nausea and vomiting  Neurological: Negative for dizziness, light-headedness and headaches  Psychiatric/Behavioral: Negative for suicidal ideas           Objective:      /62 (BP Location: Left arm, Patient Position: Sitting, Cuff Size: Large)   Pulse 64   Resp 16   Ht 5' 3" (1 6 m)   Wt 116 kg (256 lb 6 4 oz)   SpO2 99%   BMI 45 42 kg/m²                     No Follow-up on file  Allergies   Allergen Reactions    Erythromycin     Iodine     Lisinopril Cough    Omnipaque [Iohexol]     Statins Myalgia       Past Medical History:   Diagnosis Date    Allergic rhinitis due to pollen     last assessed 10/01/15    Aortic aneurysm ([address] )     Hyperlipidemia     Hypertension     Mild intermittent asthma without complication     last assessed 10/01/15    Positive PPD     last assesssed 12/21/15    Vertigo     last assessed 04/24/17     Past Surgical History:   Procedure Laterality Date    HERNIA REPAIR      TOTAL KNEE ARTHROPLASTY  08/29/2016    Dr Lozano Bodily 07/28/16     Current Outpatient Prescriptions on File Prior to Visit   Medication Sig Dispense Refill    albuterol (PROAIR HFA) 90 mcg/act inhaler Inhale 2 puffs every 4 (four) hours as needed      amLODIPine (NORVASC) 10 mg tablet Take 10 mg by mouth daily      aspirin 81 MG tablet Take by mouth      clopidogrel (PLAVIX) 75 mg tablet TAKE 1 TABLET (75 MG TOTAL) BY MOUTH DAILY    11    Coenzyme Q10 (COQ-10) 100 MG CAPS Take by mouth      ezetimibe (ZETIA) 10 mg tablet Take 10 mg by mouth daily      fluticasone (FLONASE) 50 mcg/act nasal spray 2 sprays into each nostril daily      furosemide (LASIX) 20 mg tablet Take 20 mg by mouth daily      KLOR-CON M10 10 MEQ tablet Take 10 mEq by mouth daily  11    meclizine (ANTIVERT) 25 mg tablet Take 1 tablet by mouth 3 (three) times a day as needed for dizziness for up to 30 days 30 tablet 0    meloxicam (MOBIC) 15 mg tablet Take 15 mg by mouth daily      methocarbamol (ROBAXIN) 750 mg tablet Take 750 mg by mouth daily      metoprolol succinate (TOPROL-XL) 50 mg 24 hr tablet Take 50 mg by mouth daily      oxyCODONE (OXY-IR) 5 MG capsule Take 5 mg by mouth daily      potassium chloride (K-DUR) 10 mEq tablet Take 10 mEq by mouth daily      pravastatin (PRAVACHOL) 40 mg tablet Take 1 tablet by mouth daily      traMADol (ULTRAM) 50 mg tablet Take 50 mg by mouth daily      valsartan (DIOVAN) 320 MG tablet Take 320 mg by mouth daily       No current facility-administered medications on file prior to visit  Family History   Problem Relation Age of Onset    Coronary artery disease Father     Hypertension Father     Heart disease Father      cardiac disorder     Social History     Social History    Marital status: /Civil Union     Spouse name: N/A    Number of children: 2    Years of education: N/A     Occupational History    Not on file  Social History Main Topics    Smoking status: Never Smoker    Smokeless tobacco: Never Used    Alcohol use No    Drug use: No    Sexual activity: Not on file     Other Topics Concern    Not on file     Social History Narrative    No narrative on file     Vitals:    02/28/18 1627   BP: 112/62   BP Location: Left arm   Patient Position: Sitting   Cuff Size: Large   Pulse: 64   Resp: 16   SpO2: 99%   Weight: 116 kg (256 lb 6 4 oz)   Height: 5' 3" (1 6 m)     Results for orders placed or performed in visit on 02/28/18   TSH, 3rd generation   Result Value Ref Range    TSH 3RD GENERATON 1 710 0 358 - 3 740 uIU/mL     Weight (last 2 days)     None        Body mass index is 45 42 kg/m²  BP      Temp      Pulse     Resp      SpO2        Vitals:    02/28/18 1627   Weight: 116 kg (256 lb 6 4 oz)     Vitals:    02/28/18 1627   Weight: 116 kg (256 lb 6 4 oz)        Physical Exam   Constitutional: He appears well-developed and well-nourished  No distress  HENT:   Head: Normocephalic and atraumatic  Right Ear: External ear normal    Left Ear: External ear normal    Mouth/Throat: Oropharynx is clear and moist    Eyes: Conjunctivae are normal  Pupils are equal, round, and reactive to light  Right eye exhibits no discharge  Left eye exhibits no discharge  No scleral icterus     Neck: Neck supple  Cardiovascular: Normal rate, regular rhythm and normal heart sounds  Exam reveals no gallop and no friction rub  No murmur heard  Pulmonary/Chest: No respiratory distress  He has no wheezes  He has no rales  Abdominal: Soft  Bowel sounds are normal  He exhibits no distension and no mass  There is no tenderness  There is no rebound and no guarding  Musculoskeletal: He exhibits no edema or deformity  Lymphadenopathy:     He has no cervical adenopathy  Neurological: He is alert  Skin: He is not diaphoretic  Psychiatric: He has a normal mood and affect

## 2018-03-01 NOTE — PATIENT INSTRUCTIONS
Provided supportive therapy  Reviewed stress management strategies to continue to utilize  Offered additional sessions on a PRN basis

## 2018-03-01 NOTE — PSYCH
Assessment/Plan:      There are no diagnoses linked to this encounter  Subjective:     Patient ID: Adelaide Mckeon is a 60 y o  male  Eliajay Whaley denies any acute issues  Has some issues with work stress but is managing appropriately  Hs very good support system and is active with his family and frineds  No depressive symptoms  He is pleasant, verbal and cooperative  Met with patient for 40 minutes  Review of Systems   Psychiatric/Behavioral: Negative  Objective:     Physical Exam   Psychiatric: He has a normal mood and affect  His speech is normal and behavior is normal  Judgment and thought content normal  Cognition and memory are normal    Eli Whaley denies any acute issues  No issues with anxiety  No depressive symptoms   No SI

## 2018-03-03 ENCOUNTER — APPOINTMENT (OUTPATIENT)
Dept: LAB | Facility: HOSPITAL | Age: 63
End: 2018-03-03
Payer: COMMERCIAL

## 2018-03-03 DIAGNOSIS — R42 DIZZINESS AND GIDDINESS: ICD-10-CM

## 2018-03-03 DIAGNOSIS — Z12.11 ENCOUNTER FOR SCREENING FOR MALIGNANT NEOPLASM OF COLON: ICD-10-CM

## 2018-03-03 DIAGNOSIS — I10 ESSENTIAL (PRIMARY) HYPERTENSION: ICD-10-CM

## 2018-03-03 DIAGNOSIS — R73.03 PREDIABETES: ICD-10-CM

## 2018-03-03 DIAGNOSIS — E78.5 HYPERLIPIDEMIA: ICD-10-CM

## 2018-03-03 DIAGNOSIS — Z79.899 OTHER LONG TERM (CURRENT) DRUG THERAPY: ICD-10-CM

## 2018-03-03 DIAGNOSIS — I10 BENIGN ESSENTIAL HYPERTENSION: ICD-10-CM

## 2018-03-03 LAB
ALBUMIN SERPL BCP-MCNC: 3.7 G/DL (ref 3.5–5)
ALP SERPL-CCNC: 110 U/L (ref 46–116)
ALT SERPL W P-5'-P-CCNC: 34 U/L (ref 12–78)
ANION GAP SERPL CALCULATED.3IONS-SCNC: 5 MMOL/L (ref 4–13)
AST SERPL W P-5'-P-CCNC: 22 U/L (ref 5–45)
BILIRUB SERPL-MCNC: 0.82 MG/DL (ref 0.2–1)
BUN SERPL-MCNC: 10 MG/DL (ref 5–25)
CALCIUM SERPL-MCNC: 9 MG/DL (ref 8.3–10.1)
CHLORIDE SERPL-SCNC: 106 MMOL/L (ref 100–108)
CHOLEST SERPL-MCNC: 169 MG/DL (ref 50–200)
CO2 SERPL-SCNC: 31 MMOL/L (ref 21–32)
CREAT SERPL-MCNC: 0.75 MG/DL (ref 0.6–1.3)
EST. AVERAGE GLUCOSE BLD GHB EST-MCNC: 111 MG/DL
GFR SERPL CREATININE-BSD FRML MDRD: 98 ML/MIN/1.73SQ M
GLUCOSE P FAST SERPL-MCNC: 94 MG/DL (ref 65–99)
HBA1C MFR BLD: 5.5 % (ref 4.2–6.3)
HDLC SERPL-MCNC: 40 MG/DL (ref 40–60)
LDLC SERPL CALC-MCNC: 104 MG/DL (ref 0–100)
POTASSIUM SERPL-SCNC: 3.6 MMOL/L (ref 3.5–5.3)
PROT SERPL-MCNC: 7.7 G/DL (ref 6.4–8.2)
SODIUM SERPL-SCNC: 142 MMOL/L (ref 136–145)
TRIGL SERPL-MCNC: 126 MG/DL
TSH SERPL DL<=0.05 MIU/L-ACNC: 1.71 UIU/ML (ref 0.36–3.74)

## 2018-03-03 PROCEDURE — 84443 ASSAY THYROID STIM HORMONE: CPT | Performed by: INTERNAL MEDICINE

## 2018-03-03 PROCEDURE — 83036 HEMOGLOBIN GLYCOSYLATED A1C: CPT

## 2018-03-03 PROCEDURE — 36415 COLL VENOUS BLD VENIPUNCTURE: CPT

## 2018-03-03 PROCEDURE — 80053 COMPREHEN METABOLIC PANEL: CPT

## 2018-03-03 PROCEDURE — 80061 LIPID PANEL: CPT

## 2018-03-28 LAB
CHOLEST SERPL-MCNC: 144 MG/DL
CHOLEST/HDLC SERPL: 3.7 (CALC)
HDLC SERPL-MCNC: 39 MG/DL
LDLC SERPL CALC-MCNC: 84 MG/DL (CALC)
NONHDLC SERPL-MCNC: 105 MG/DL (CALC)
TRIGL SERPL-MCNC: 116 MG/DL

## 2018-05-22 ENCOUNTER — OFFICE VISIT (OUTPATIENT)
Dept: INTERNAL MEDICINE CLINIC | Facility: CLINIC | Age: 63
End: 2018-05-22
Payer: COMMERCIAL

## 2018-05-22 VITALS
DIASTOLIC BLOOD PRESSURE: 80 MMHG | HEART RATE: 53 BPM | SYSTOLIC BLOOD PRESSURE: 124 MMHG | WEIGHT: 257 LBS | BODY MASS INDEX: 45.54 KG/M2 | HEIGHT: 63 IN | OXYGEN SATURATION: 98 %

## 2018-05-22 DIAGNOSIS — I10 ESSENTIAL HYPERTENSION: ICD-10-CM

## 2018-05-22 DIAGNOSIS — E78.5 DYSLIPIDEMIA: Primary | ICD-10-CM

## 2018-05-22 DIAGNOSIS — R73.03 PREDIABETES: ICD-10-CM

## 2018-05-22 DIAGNOSIS — Z23 NEED FOR TDAP VACCINATION: ICD-10-CM

## 2018-05-22 PROCEDURE — 90471 IMMUNIZATION ADMIN: CPT

## 2018-05-22 PROCEDURE — 90715 TDAP VACCINE 7 YRS/> IM: CPT

## 2018-05-22 PROCEDURE — 99214 OFFICE O/P EST MOD 30 MIN: CPT | Performed by: NURSE PRACTITIONER

## 2018-05-22 PROCEDURE — 3079F DIAST BP 80-89 MM HG: CPT | Performed by: NURSE PRACTITIONER

## 2018-05-22 PROCEDURE — 3074F SYST BP LT 130 MM HG: CPT | Performed by: NURSE PRACTITIONER

## 2018-05-22 RX ORDER — METOPROLOL SUCCINATE 25 MG/1
TABLET, EXTENDED RELEASE ORAL
Refills: 11 | Status: ON HOLD | COMMUNITY
Start: 2018-04-16 | End: 2019-11-14 | Stop reason: SDUPTHER

## 2018-05-24 NOTE — PROGRESS NOTES
Assessment/Plan:       Diagnoses and all orders for this visit:    Dyslipidemia  -     Comprehensive metabolic panel; Future  -     Lipid panel; Future  -     HEMOGLOBIN A1C W/ EAG ESTIMATION; Future    Prediabetes  -     Comprehensive metabolic panel; Future  -     Lipid panel; Future  -     HEMOGLOBIN A1C W/ EAG ESTIMATION; Future    Essential hypertension  -     Comprehensive metabolic panel; Future  -     Lipid panel; Future  -     HEMOGLOBIN A1C W/ EAG ESTIMATION; Future    Need for Tdap vaccination  -     TDAP VACCINE GREATER THAN OR EQUAL TO 6YO IM    Other orders  -     metoprolol succinate (TOPROL-XL) 25 mg 24 hr tablet; TAKE 1 TABLET (25 MG TOTAL) BY MOUTH DAILY  Subjective:      Patient ID: Kitty Sanchez is a 60 y o  male  Patient is here for a regular check up    Has forms to fill out for work  Needs tdap    dld- on medication, under control  htn- on medication, under control on low sodium diet        The following portions of the patient's history were reviewed and updated as appropriate: allergies, current medications, past family history, past medical history, past social history, past surgical history and problem list     Review of Systems   Constitutional: Negative  HENT: Negative  Eyes: Negative  Respiratory: Negative  Cardiovascular: Negative  Gastrointestinal: Negative  Musculoskeletal: Negative  Neurological: Negative  Objective:      /80 (BP Location: Left arm, Patient Position: Sitting, Cuff Size: Large)   Pulse (!) 53   Ht 5' 3" (1 6 m)   Wt 117 kg (257 lb)   SpO2 98%   BMI 45 53 kg/m²          Physical Exam   Constitutional: He is oriented to person, place, and time  He appears well-developed and well-nourished  HENT:   Head: Normocephalic and atraumatic  Eyes: Conjunctivae are normal  Pupils are equal, round, and reactive to light  Neck: Normal range of motion  Neck supple  Cardiovascular: Normal rate and regular rhythm  Pulmonary/Chest: Effort normal and breath sounds normal    Abdominal: Soft  Bowel sounds are normal    Musculoskeletal: Normal range of motion  Neurological: He is alert and oriented to person, place, and time  Skin: Skin is warm and dry

## 2018-05-26 DIAGNOSIS — I10 ESSENTIAL HYPERTENSION: Primary | ICD-10-CM

## 2018-05-27 NOTE — TELEPHONE ENCOUNTER
Make sure pt can tolerate the valsartan (computer reports that he is allergic to lisinopril) please let me know

## 2018-05-29 RX ORDER — VALSARTAN 320 MG/1
TABLET ORAL
Qty: 30 TABLET | Refills: 5 | Status: SHIPPED | OUTPATIENT
Start: 2018-05-29 | End: 2018-12-01 | Stop reason: SDUPTHER

## 2018-05-29 NOTE — TELEPHONE ENCOUNTER
Patient called back and said he hasn't noticed any adverse reactions to this medication    Please advise

## 2018-06-01 ENCOUNTER — OFFICE VISIT (OUTPATIENT)
Dept: NEUROLOGY | Facility: CLINIC | Age: 63
End: 2018-06-01
Payer: COMMERCIAL

## 2018-06-01 VITALS
DIASTOLIC BLOOD PRESSURE: 76 MMHG | BODY MASS INDEX: 46.06 KG/M2 | SYSTOLIC BLOOD PRESSURE: 134 MMHG | WEIGHT: 260 LBS | HEART RATE: 70 BPM

## 2018-06-01 DIAGNOSIS — Z86.73 HISTORY OF TRANSIENT ISCHEMIC ATTACK (TIA): Primary | ICD-10-CM

## 2018-06-01 DIAGNOSIS — E78.5 HYPERLIPIDEMIA, UNSPECIFIED HYPERLIPIDEMIA TYPE: ICD-10-CM

## 2018-06-01 DIAGNOSIS — I10 BENIGN ESSENTIAL HYPERTENSION: ICD-10-CM

## 2018-06-01 DIAGNOSIS — Z96.652 STATUS POST TOTAL LEFT KNEE REPLACEMENT: ICD-10-CM

## 2018-06-01 DIAGNOSIS — I63.81 RIGHT-SIDED LACUNAR INFARCTION (HCC): ICD-10-CM

## 2018-06-01 PROBLEM — R26.9 ABNORMAL GAIT: Status: ACTIVE | Noted: 2017-03-15

## 2018-06-01 PROBLEM — J01.00 ACUTE MAXILLARY SINUSITIS: Status: ACTIVE | Noted: 2017-06-20

## 2018-06-01 PROBLEM — R09.89 CAROTID BRUIT: Status: ACTIVE | Noted: 2017-03-15

## 2018-06-01 PROBLEM — I25.10 CORONARY ARTERY DISEASE: Status: ACTIVE | Noted: 2017-04-21

## 2018-06-01 PROCEDURE — 99214 OFFICE O/P EST MOD 30 MIN: CPT | Performed by: PSYCHIATRY & NEUROLOGY

## 2018-06-01 NOTE — LETTER
June 4, 2018     Bulmaro Briggs [address]  [address] [facility] [address]     Patient: Essence Qureshi   YOB: 1955   Date of Visit: 6/1/2018       Dear Dr Char Bishop: Thank you for referring Essence Qureshi to me for evaluation  Below are my notes for this consultation  If you have questions, please do not hesitate to call me  I look forward to following your patient along with you  Sincerely,        Arash Cruz MD        CC: No Recipients  Arash Cruz MD  6/4/2018 12:37 PM  Signed  Patient ID: Essence Qureshi is a 60 y o  male  Assessment/Plan:       Problem List Items Addressed This Visit        Cardiovascular and Mediastinum    Benign essential hypertension       Nervous and Auditory    Right-sided lacunar infarction ([facility] [address] )       Other    Hyperlipidemia    Status post total left knee replacement    History of transient ischemic attack (TIA) - Primary            Discussion Summary:    Patient is doing well  No neurologic problems over the past year  He continues to take clopidogrel and should continue to take that to decrease his risk for recurrence of TIA  I filled out his occupational medicine form indicating 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 [employer]  This form will need to be filled out annually and we can take care of it on his annual follow-up appointments  Subjective:    HPI    Annual follow up of a 60year old male referred to me by Dr Bulmaro Briggs  Patient has a history of hypertension, hypercholesterolemia, coronary artery disease, and episodes of vertigo every 2-3 years  When I saw the patient for the first time on 4/21/2017, he seemed to present with a confusing array of symptoms, the confusion caused in part by his having been billed as a patient who had been referred for dizziness and blacking out, with the ER note stating that as his chief complaint   After spending 2 hours of face to face time with the patient and his wife, however, some of that time spent getting a detailed description of his symptomatology, it became clear that the patient had fallen to the floor on 3/13 due to abrupt onset of generalized weakness, perhaps worse in his left leg than elsewhere, and that he continued to drift to the left when walking for at least 2 days after the event  He was NOT, in fact, dizzy, and the patient did not black out  The history was more consistent with his having had a right hemisphere TIA than either syncope or seizure  This presentation actually fit well with the results of the patient's work-up, i e  the presence of a right carotid bruit, carotid dopplers revealing an ulcerated plaque in the right carotid, and 2 small areas of infarction in the right basal ganglia  At first, I thought it might be necessary for the patient to undergo further imaging of his cerebral vasculature  Unfortunately, he cannot tolerate MRA in a standard MRI machine because of 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  His difficulty with small spaces would necessitate at least intravenous sedation if he were to have an MRA performed  On the other hand, he is severely allergic to CT contrast material, having developed a severe respiratory reaction to iodine based contrast in the past  I consulted with the Director of our Stroke Program, Dr Carlos Card, for advice  Dr Lluvia Almazan indicated that performing either one of those studies would likely not add significant information that would be helpful in determining how to proceed in preventing further cerebrovascular ischemic events  The irregular surface of the plaque in the right ICA suggests that the patient's stroke could have occurred on the basis of an artery to artery embolus, but the description of the MRI appearance of the right basal ganglia infarcts is more consistent with lacunar infarctions   The only reason to perform further imaging would be to determine whether carotid surgery would be helpful, and given the Doppler and MRI results, and the fact that the patient has had no further events since 3/13, such surgical intervention is not likely to be indicated  Instead, I placed him on dual anti-platelet therapy giving him aspirin and Plavix to prevent further TIA/stroke  Dr Debra Cunningham also stated that the patient NEEDS to be on a statin since, unlike the Zetia that the patient is taking to lower his cholesterol now, the statins decrease the risk for recurrent stroke over and above what they do to lower the patient's lipids  Since myalgias from the statins are dose related, the patient may tolerate a lower dose than what he tried before, and Dr Debra Cunningham indicated that of the statins, pravastatin causes myalgias less frequently  Therefore, he was kept on his Zetia and placed on pravastatin  The patient was scheduled to follow-up with Dr Debra Cunningham  Last seen on 7/7/17  INTERVAL HISTORY:    Patient brought paperwork with him today that needs to be filled out in order for him to maintain his job as a   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  He has been staying active by swimming and reports that he has lost some weight  He has had his left knee replaced since I last saw him and he reports that he will be getting his right knee replaced soon because it is causing him significant pain such that he has abnormal gait  The following portions of the patient's history were reviewed and updated as appropriate: allergies, current medications, past family history, past medical history, past social history, past surgical history and problem list        Objective:    Blood pressure 134/76, pulse 70, weight 118 kg (260 lb)  Physical Exam   Constitutional: He appears well-developed and well-nourished  obese   HENT:   Head: Normocephalic and atraumatic     Eyes: EOM are normal  Pupils are equal, round, and reactive to light  Neck: Neck supple  Cardiovascular: Normal rate and regular rhythm  Pulmonary/Chest: Effort normal    Neurological: He is alert  He has normal strength  Coordination normal    Psychiatric: His speech is normal    Vitals reviewed  Neurological Exam    Mental Status  The patient is alert and oriented to person, place, time, and situation  His recent and remote memory are normal  His speech is normal  His language is fluent with no aphasia  He has normal attention span and concentration  He has a normal fund of knowledge  Cranial Nerves    CN II: The patient's visual acuity and visual fields are normal   CN III, IV, VI: The patient's pupils are equally round and reactive to light and ocular movements are normal   CN V: The patient has normal facial sensation  CN VII:  The patient has symmetric facial movement  CN VIII:  The patient's hearing is normal   CN IX, X: The patient has symmetric palate movement and normal gag reflex  CN XI: The patient's shoulder shrug strength is normal   CN XII: The patient's tongue is midline without atrophy or fasciculations  Motor   His strength is 5/5 throughout all four extremities  Gait and Coordination    He has normal tandem gait  He has normal coordination bilaterally  Has right knee pain causing his gait to be abnormal          ROS:    Review of Systems   Constitutional: Negative  HENT: Negative  Eyes: Negative  Respiratory: Negative  Cardiovascular: Negative  Gastrointestinal: Negative  Endocrine: Negative  Genitourinary: Negative  Musculoskeletal: Negative  Skin: Negative  Allergic/Immunologic: Negative  Neurological: Negative  Hematological: Negative  Psychiatric/Behavioral: Negative  Counseling Documentation:  Time in: 9:10, Time out: 9:50  Total time encounter was 40 minutes and 30 minutes were spent counseling the patient        Attestation: By signing my name below, Noemy Ivy, attest that this documentation has been prepared under the direction and in the presence of Bandar Larry MD  Electronically Signed: Flaca Catalan  06/01/18     I, Badnar Larry, personally performed the services described in this documentation  All medical record entries made by the scribe were at my direction and in my presence  I have reviewed the chart and discharge instructions and agree that the record reflects my personal performance and is accurate and complete    Bandar Larry MD  06/01/18

## 2018-06-01 NOTE — PROGRESS NOTES
Patient ID: Jessi Snell is a 60 y o  male  Assessment/Plan:       Problem List Items Addressed This Visit        Cardiovascular and Mediastinum    Benign essential hypertension       Nervous and Auditory    Right-sided lacunar infarction ([address] )       Other    Hyperlipidemia    Status post total left knee replacement    History of transient ischemic attack (TIA) - Primary            Discussion Summary:    Patient is doing well  No neurologic problems over the past year  He continues to take clopidogrel and should continue to take that to decrease his risk for recurrence of TIA  I filled out his occupational medicine form indicating 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 [employer]  This form will need to be filled out annually and we can take care of it on his annual follow-up appointments  Subjective:    HPI    Annual follow up of a 60year old male referred to me by Dr Ivelisse Green  Patient has a history of hypertension, hypercholesterolemia, coronary artery disease, and episodes of vertigo every 2-3 years  When I saw the patient for the first time on 4/21/2017, he seemed to present with a confusing array of symptoms, the confusion caused in part by his having been billed as a patient who had been referred for dizziness and blacking out, with the ER note stating that as his chief complaint  After spending 2 hours of face to face time with the patient and his wife, however, some of that time spent getting a detailed description of his symptomatology, it became clear that the patient had fallen to the floor on 3/13 due to abrupt onset of generalized weakness, perhaps worse in his left leg than elsewhere, and that he continued to drift to the left when walking for at least 2 days after the event  He was NOT, in fact, dizzy, and the patient did not black out  The history was more consistent with his having had a right hemisphere TIA than either syncope or seizure  This presentation actually fit well with the results of the patient's work-up, i e  the presence of a right carotid bruit, carotid dopplers revealing an ulcerated plaque in the right carotid, and 2 small areas of infarction in the right basal ganglia  At first, I thought it might be necessary for the patient to undergo further imaging of his cerebral vasculature  Unfortunately, he cannot tolerate MRA in a standard MRI machine because of 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  His difficulty with small spaces would necessitate at least intravenous sedation if he were to have an MRA performed  On the other hand, he is severely allergic to CT contrast material, having developed a severe respiratory reaction to iodine based contrast in the past  I consulted with the Director of our Stroke Program, Dr Karen Mcnamara, for advice  Dr Elissa Zimmer indicated that performing either one of those studies would likely not add significant information that would be helpful in determining how to proceed in preventing further cerebrovascular ischemic events  The irregular surface of the plaque in the right ICA suggests that the patient's stroke could have occurred on the basis of an artery to artery embolus, but the description of the MRI appearance of the right basal ganglia infarcts is more consistent with lacunar infarctions  The only reason to perform further imaging would be to determine whether carotid surgery would be helpful, and given the Doppler and MRI results, and the fact that the patient has had no further events since 3/13, such surgical intervention is not likely to be indicated  Instead, I placed him on dual anti-platelet therapy giving him aspirin and Plavix to prevent further TIA/stroke   Dr Elissa Zimmer also stated that the patient NEEDS to be on a statin since, unlike the Zetia that the patient is taking to lower his cholesterol now, the statins decrease the risk for recurrent stroke over and above what they do to lower the patient's lipids  Since myalgias from the statins are dose related, the patient may tolerate a lower dose than what he tried before, and Dr Christiano Pierce indicated that of the statins, pravastatin causes myalgias less frequently  Therefore, he was kept on his Zetia and placed on pravastatin  The patient was scheduled to follow-up with Dr Christiano Pierce  Last seen on 7/7/17  INTERVAL HISTORY:    Patient brought paperwork with him today that needs to be filled out in order for him to maintain his job as a   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  He has been staying active by swimming and reports that he has lost some weight  He has had his left knee replaced since I last saw him and he reports that he will be getting his right knee replaced soon because it is causing him significant pain such that he has abnormal gait  The following portions of the patient's history were reviewed and updated as appropriate: allergies, current medications, past family history, past medical history, past social history, past surgical history and problem list        Objective:    Blood pressure 134/76, pulse 70, weight 118 kg (260 lb)  Physical Exam   Constitutional: He appears well-developed and well-nourished  obese   HENT:   Head: Normocephalic and atraumatic  Eyes: EOM are normal  Pupils are equal, round, and reactive to light  Neck: Neck supple  Cardiovascular: Normal rate and regular rhythm  Pulmonary/Chest: Effort normal    Neurological: He is alert  He has normal strength  Coordination normal    Psychiatric: His speech is normal    Vitals reviewed  Neurological Exam    Mental Status  The patient is alert and oriented to person, place, time, and situation  His recent and remote memory are normal  His speech is normal  His language is fluent with no aphasia   He has normal attention span and concentration  He has a normal fund of knowledge  Cranial Nerves    CN II: The patient's visual acuity and visual fields are normal   CN III, IV, VI: The patient's pupils are equally round and reactive to light and ocular movements are normal   CN V: The patient has normal facial sensation  CN VII:  The patient has symmetric facial movement  CN VIII:  The patient's hearing is normal   CN IX, X: The patient has symmetric palate movement and normal gag reflex  CN XI: The patient's shoulder shrug strength is normal   CN XII: The patient's tongue is midline without atrophy or fasciculations  Motor   His strength is 5/5 throughout all four extremities  Gait and Coordination    He has normal tandem gait  He has normal coordination bilaterally  Has right knee pain causing his gait to be abnormal          ROS:    Review of Systems   Constitutional: Negative  HENT: Negative  Eyes: Negative  Respiratory: Negative  Cardiovascular: Negative  Gastrointestinal: Negative  Endocrine: Negative  Genitourinary: Negative  Musculoskeletal: Negative  Skin: Negative  Allergic/Immunologic: Negative  Neurological: Negative  Hematological: Negative  Psychiatric/Behavioral: Negative  Counseling Documentation:  Time in: 9:10, Time out: 9:50  Total time encounter was 40 minutes and 30 minutes were spent counseling the patient  Attestation:   By signing my name below, Noemy Peterson, attest that this documentation has been prepared under the direction and in the presence of Bandar Larry MD  Electronically Signed: Flaca Catalan  06/01/18     I, Bandar Larry, personally performed the services described in this documentation  All medical record entries made by the scribe were at my direction and in my presence  I have reviewed the chart and discharge instructions and agree that the record reflects my personal performance and is accurate and complete  Chiquita Lamb MD  06/01/18

## 2018-06-04 PROBLEM — Z86.73 HISTORY OF TRANSIENT ISCHEMIC ATTACK (TIA): Status: ACTIVE | Noted: 2018-06-04

## 2018-06-06 ENCOUNTER — OFFICE VISIT (OUTPATIENT)
Dept: SLEEP CENTER | Facility: CLINIC | Age: 63
End: 2018-06-06
Payer: COMMERCIAL

## 2018-06-06 VITALS
WEIGHT: 260.2 LBS | HEART RATE: 58 BPM | BODY MASS INDEX: 43.35 KG/M2 | SYSTOLIC BLOOD PRESSURE: 120 MMHG | HEIGHT: 65 IN | DIASTOLIC BLOOD PRESSURE: 68 MMHG

## 2018-06-06 DIAGNOSIS — G47.33 OSA (OBSTRUCTIVE SLEEP APNEA): Primary | ICD-10-CM

## 2018-06-06 PROCEDURE — 99214 OFFICE O/P EST MOD 30 MIN: CPT | Performed by: INTERNAL MEDICINE

## 2018-06-06 NOTE — PROGRESS NOTES
Progress Note - Sleep Center   Noemi Fenton MK MRN: [id number]      Reason for Visit:  60 y o male here for PAP compliance check, for his job    Assessment:  Doing well with new PAP device  Sleep quality is improved and the patient reports less daytime sleepiness  Compliance data show utilization for greater than or equal to 70% of nights for greater than or equal to 4 hours per night  Plan:  Continue same  I ordered a [employer] full face mask, which he would like to try  Follow up: One year    History of Present Illness:  History of VIRGEN on PAP therapy  Fully compliant and deriving benefit      Review of Systems      Genitourinary none   Cardiology none   Gastrointestinal none   Neurology none   Constitutional none   Integumentary none   Psychiatry none   Musculoskeletal none   Pulmonary none   ENT none   Endocrine none   Hematological none         Historical Information    Past Medical History:   Past Medical History:   Diagnosis Date    Allergic rhinitis due to pollen     last assessed 10/01/15    Aortic aneurysm [address] [address]     Hyperlipidemia     Hypertension     Mild intermittent asthma without complication     last assessed 10/01/15    Positive PPD     last assesssed 12/21/15    Vertigo     last assessed 17         Past Surgical History:   Past Surgical History:   Procedure Laterality Date    HERNIA REPAIR      TOTAL KNEE ARTHROPLASTY  2016    Dr Marianne Shaw 15             Family History:   Family History   Problem Relation Age of Onset    Coronary artery disease Father     Hypertension Father     Heart disease Father      cardiac disorder       Medications/Allergies:      Current Outpatient Prescriptions:     albuterol (PROAIR HFA) 90 mcg/act inhaler, Inhale 2 puffs every 4 (four) hours as needed, Disp: , Rfl:     amLODIPine (NORVASC) 10 mg tablet, Take 5 mg by mouth daily  , Disp: , Rfl:     aspirin 81 MG tablet, Take by mouth, Disp: , Rfl:    Cholecalciferol 1000 UNIT/10ML LIQD, Take 1,000 Units by mouth, Disp: , Rfl:     clopidogrel (PLAVIX) 75 mg tablet, TAKE 1 TABLET (75 MG TOTAL) BY MOUTH DAILY  , Disp: , Rfl: 11    co-enzyme Q-10 100 mg capsule, Take 100 mg by mouth, Disp: , Rfl:     Coenzyme Q10 (COQ-10) 100 MG CAPS, Take by mouth, Disp: , Rfl:     ezetimibe (ZETIA) 10 mg tablet, Take 10 mg by mouth daily, Disp: , Rfl:     ezetimibe (ZETIA) 10 mg tablet, Take 10 mg by mouth, Disp: , Rfl:     fluticasone (FLONASE) 50 mcg/act nasal spray, 2 sprays into each nostril daily, Disp: , Rfl:     furosemide (LASIX) 20 mg tablet, Take 20 mg by mouth daily, Disp: , Rfl:     KLOR-CON M10 10 MEQ tablet, Take 10 mEq by mouth daily, Disp: , Rfl: 11    meloxicam (MOBIC) 15 mg tablet, Take 15 mg by mouth daily, Disp: , Rfl:     methocarbamol (ROBAXIN) 750 mg tablet, Take 750 mg by mouth daily, Disp: , Rfl:     metoprolol succinate (TOPROL-XL) 25 mg 24 hr tablet, TAKE 1 TABLET (25 MG TOTAL) BY MOUTH DAILY  , Disp: , Rfl: 11    multivitamin (THERAGRAN) TABS, Take 1 tablet by mouth, Disp: , Rfl:     oxyCODONE (OXY-IR) 5 MG capsule, Take 5 mg by mouth daily, Disp: , Rfl:     potassium chloride (K-DUR) 10 mEq tablet, Take 10 mEq by mouth daily, Disp: , Rfl:     pravastatin (PRAVACHOL) 40 mg tablet, Take 1 tablet by mouth daily, Disp: , Rfl:     ranitidine (ZANTAC) 150 mg tablet, Take 150 mg by mouth, Disp: , Rfl:     rosuvastatin (CRESTOR) 5 mg tablet, Take 1 tablet (5 mg total) by mouth daily, Disp: 30 tablet, Rfl: 6    senna-docusate sodium (SENOKOT-S) 8 6-50 mg per tablet, Take 1 tablet by mouth, Disp: , Rfl:     traMADol (ULTRAM) 50 mg tablet, Take 50 mg by mouth daily, Disp: , Rfl:     valsartan (DIOVAN) 320 MG tablet, TAKE 1 TABLET DAILY, Disp: 30 tablet, Rfl: 5    meclizine (ANTIVERT) 25 mg tablet, Take 1 tablet by mouth 3 (three) times a day as needed for dizziness for up to 30 days, Disp: 30 tablet, Rfl: 0      Objective    Vital Signs: Vitals:    06/06/18 1200   BP: 120/68   Pulse: 58     Shannon Sleepiness Scale: Total score: 0        Physical Exam:    General: Alert, appropriate, cooperative, overweight    Head: NC/AT    Skin: Warm, dry    Neuro: No motor abnormalities, cranial nerves appear intact    Extremity: No clubbing, cyanosis    PAP setting:   CPAP 10 cm  DME Provider: [employer]    Counseling / Coordination of Care  Total clinic time spent today 15 minutes  Greater than 50% of total time was spent with the patient and / or family counseling and / or coordination of care  A description of the counseling / coordination of care: Discussed equipment and response to treatment  NEYDA Sweeney    Board Certified Sleep Specialist

## 2018-09-07 ENCOUNTER — OFFICE VISIT (OUTPATIENT)
Dept: INTERNAL MEDICINE CLINIC | Facility: CLINIC | Age: 63
End: 2018-09-07
Payer: COMMERCIAL

## 2018-09-07 VITALS
DIASTOLIC BLOOD PRESSURE: 78 MMHG | WEIGHT: 271 LBS | HEART RATE: 75 BPM | OXYGEN SATURATION: 94 % | HEIGHT: 65 IN | SYSTOLIC BLOOD PRESSURE: 140 MMHG | BODY MASS INDEX: 45.15 KG/M2

## 2018-09-07 DIAGNOSIS — I10 BENIGN ESSENTIAL HYPERTENSION: ICD-10-CM

## 2018-09-07 DIAGNOSIS — Z00.00 HEALTHCARE MAINTENANCE: ICD-10-CM

## 2018-09-07 DIAGNOSIS — R73.03 PREDIABETES: ICD-10-CM

## 2018-09-07 DIAGNOSIS — R25.2 LEG CRAMPING: Primary | ICD-10-CM

## 2018-09-07 DIAGNOSIS — Z12.11 SCREENING FOR MALIGNANT NEOPLASM OF COLON: ICD-10-CM

## 2018-09-07 PROCEDURE — 99214 OFFICE O/P EST MOD 30 MIN: CPT | Performed by: NURSE PRACTITIONER

## 2018-09-07 PROCEDURE — 99396 PREV VISIT EST AGE 40-64: CPT | Performed by: NURSE PRACTITIONER

## 2018-09-07 PROCEDURE — 3008F BODY MASS INDEX DOCD: CPT | Performed by: NURSE PRACTITIONER

## 2018-09-07 NOTE — PATIENT INSTRUCTIONS
Leg Exercises for ACL Injury   WHAT YOU NEED TO KNOW:   What are leg exercises for an ACL injury? You will learn leg exercises during physical therapy (PT) to help you recover from an ACL injury  You will need to exercise several parts of your leg to build strength and flexibility evenly  If you work one area too much, you may injure your knee because the opposite side will be weaker  Your physical therapist will tell you which exercises to do  He will also tell you how often to do them  You may start by doing the exercises with your therapist and then continue to do them at home  What safety precautions do I need to take? · Warm up before you do the exercises  Walk or ride a stationary bike for 5 or 10 minutes to warm your muscles  · Start slowly  You may want to start running or playing sports again  PT takes time  You will need to start slowly and build your speed and endurance  Do not  try to lift heavy weights or run before your therapist says you are ready  You may cause more injury to your ACL  · Stop if you feel pain  Pain may be a sign that you are not ready to be doing a certain movement  You should expect some discomfort at first  Your leg may be stiff or sore  As you continue PT, it should become easier to do the exercises without discomfort  Always tell your therapist if a movement causes pain  Heel slides:         · Sit or lie on the floor and put your legs out straight in front of you  Bend your knee so your foot is flat on the floor  · Slowly slide your heel toward your buttocks  Keep your foot on the floor  You can also use a towel to help bring your foot back  · Slowly slide your foot back to the starting position  Leg presses:  Do not  use ankle weights for this exercise  The weights may cause injury to your knee  Use a resistance band  · Put the band around the center of your foot  Hold one end of the band with each hand  · Tighten your thigh muscle   Then bring your knee toward your chest      · Straighten your leg so you are pushing against the band  Relax your leg and return your knee to your chest   Single leg dips:         · Stand on your injured leg, between 2 chairs  The backs should be toward you  Put one hand on each chair  Straighten your uninjured leg in front of you and lift it off the floor  Use the chairs to hold some of your weight  · Bend your knee and lower your body slowly toward the floor a few inches  Your weight should be on your heel  Hold for 5 seconds  Then return to a standing position  Standing half squats:         · Put your back against a wall, or hold the back of a chair for balance  · Lower your body about 10 inches  Keep your weight back on your heels  Hold for about 5 seconds  Then return to standing  Standing hamstring curls: This exercise strengthens the muscles in the back of your thigh  · Put your hands flat on a wall, or hold a chair for balance  · Lift the foot of your injured leg and try to bring it to your buttocks  Hold for 5 seconds  Then lower your leg  Standing calf raises:         · Put both hands flat on a wall, or hold a chair for balance  · Bend the knee of your uninjured leg  Raise and lower the heel of your injured leg  Sitting leg extensions:         · Sit in a chair and slowly raise your injured leg  · Squeeze your thigh muscles  Hold this position for 5 seconds  Then lower your foot to the floor  Straight leg raises:         · Lie on your stomach  Keep your legs straight  Tighten your leg muscles and raise your leg as high as you can  Hold for 5 seconds  Then lower your leg  · Lie on your back  Keep your straight legs  Slowly straighten and raise your leg  Squeeze your thigh muscles and hold for 5 seconds  Relax and return your leg to the floor  Hip abduction: This exercise helps strengthen your outer thigh  · Lie on your uninjured side  Straighten your legs      · Raise and lower your injured leg  Hip adduction: This exercise helps strengthen your inner thigh  · Lie on your injured side  Put the other leg over your injured leg  Put your foot on the floor in front of you  · Raise your injured leg until it touches the other leg  Then lower the leg  CARE AGREEMENT:   You have the right to help plan your care  Learn about your health condition and how it may be treated  Discuss treatment options with your caregivers to decide what care you want to receive  You always have the right to refuse treatment  The above information is an  only  It is not intended as medical advice for individual conditions or treatments  Talk to your doctor, nurse or pharmacist before following any medical regimen to see if it is safe and effective for you  © 2017 [employer]  Information is for End User's use only and may not be sold, redistributed or otherwise used for commercial purposes  All illustrations and images included in CareNotes® are the copyrighted property of [employer]  or Darryl Avilez

## 2018-09-07 NOTE — PROGRESS NOTES
Assessment/Plan:      Leg cramping   Hydrate well, 6-8 glasses of water a day   Leg stretching exercises, printed out for patient    Benign essential hypertension   Under control on meds  Prediabetes   Under control on diet and medication        Subjective:      Patient ID: Enriqueta Trevizo is a 58 y o  male  Patient is here for severe bilateral leg cramps that woke him up this morning    No sob  No chest pain    He can walk normally  He thinks he is dehydrated, show me how his skin turgor is poor        The following portions of the patient's history were reviewed and updated as appropriate: allergies, current medications, past family history, past medical history, past social history, past surgical history and problem list     Review of Systems   Constitutional: Negative  HENT: Negative  Eyes: Negative  Respiratory: Negative  Cardiovascular: Negative  Gastrointestinal: Negative  Musculoskeletal: Negative  Neurological: Negative  Family History   Problem Relation Age of Onset    Coronary artery disease Father     Hypertension Father     Heart disease Father         cardiac disorder     Past Medical History:   Diagnosis Date    Allergic rhinitis due to pollen     last assessed 10/01/15    Aortic aneurysm ([address] )     Hyperlipidemia     Hypertension     Mild intermittent asthma without complication     last assessed 10/01/15    Positive PPD     last assesssed 12/21/15    Vertigo     last assessed 04/24/17     Social History     Social History    Marital status: /Civil Union     Spouse name: N/A    Number of children: 2    Years of education: N/A     Occupational History    Not on file       Social History Main Topics    Smoking status: Never Smoker    Smokeless tobacco: Never Used    Alcohol use No    Drug use: No    Sexual activity: Not on file     Other Topics Concern    Not on file     Social History Narrative    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[employer] Current Outpatient Prescriptions:     albuterol (PROAIR HFA) 90 mcg/act inhaler, Inhale 2 puffs every 4 (four) hours as needed, Disp: , Rfl:     amLODIPine (NORVASC) 10 mg tablet, Take 5 mg by mouth daily  , Disp: , Rfl:     aspirin 81 MG tablet, Take by mouth, Disp: , Rfl:     Cholecalciferol 1000 UNIT/10ML LIQD, Take 1,000 Units by mouth, Disp: , Rfl:     clopidogrel (PLAVIX) 75 mg tablet, TAKE 1 TABLET (75 MG TOTAL) BY MOUTH DAILY  , Disp: , Rfl: 11    Coenzyme Q10 (COQ-10) 100 MG CAPS, Take by mouth, Disp: , Rfl:     ezetimibe (ZETIA) 10 mg tablet, Take 10 mg by mouth daily, Disp: , Rfl:     fluticasone (FLONASE) 50 mcg/act nasal spray, 2 sprays into each nostril daily, Disp: , Rfl:     furosemide (LASIX) 20 mg tablet, Take 20 mg by mouth daily, Disp: , Rfl:     KLOR-CON M10 10 MEQ tablet, Take 10 mEq by mouth daily, Disp: , Rfl: 11    meloxicam (MOBIC) 15 mg tablet, Take 15 mg by mouth daily, Disp: , Rfl:     methocarbamol (ROBAXIN) 750 mg tablet, Take 750 mg by mouth daily, Disp: , Rfl:     metoprolol succinate (TOPROL-XL) 25 mg 24 hr tablet, TAKE 1 TABLET (25 MG TOTAL) BY MOUTH DAILY  , Disp: , Rfl: 11    multivitamin (THERAGRAN) TABS, Take 1 tablet by mouth, Disp: , Rfl:     oxyCODONE (OXY-IR) 5 MG capsule, Take 5 mg by mouth daily, Disp: , Rfl:     potassium chloride (K-DUR) 10 mEq tablet, Take 10 mEq by mouth daily, Disp: , Rfl:     pravastatin (PRAVACHOL) 40 mg tablet, Take 1 tablet by mouth daily, Disp: , Rfl:     ranitidine (ZANTAC) 150 mg tablet, Take 150 mg by mouth, Disp: , Rfl:     rosuvastatin (CRESTOR) 5 mg tablet, Take 1 tablet (5 mg total) by mouth daily, Disp: 30 tablet, Rfl: 6    senna-docusate sodium (SENOKOT-S) 8 6-50 mg per tablet, Take 1 tablet by mouth, Disp: , Rfl:     traMADol (ULTRAM) 50 mg tablet, Take 50 mg by mouth daily, Disp: , Rfl:     valsartan (DIOVAN) 320 MG tablet, TAKE 1 TABLET DAILY, Disp: 30 tablet, Rfl: 5    meclizine (ANTIVERT) 25 mg tablet, Take 1 tablet by mouth 3 (three) times a day as needed for dizziness for up to 30 days, Disp: 30 tablet, Rfl: 0  Allergies   Allergen Reactions    Erythromycin      Other reaction(s): Other (See Comments)  Pleurisy     Iodine      Other reaction(s): Respiratory Distress    Lisinopril Cough    Omnipaque [Iohexol]     Statins Myalgia           Objective:      /78 (BP Location: Left arm, Patient Position: Sitting, Cuff Size: Large)   Pulse 75   Ht 5' 5" (1 651 m)   Wt 123 kg (271 lb)   SpO2 94%   BMI 45 10 kg/m²          Physical Exam   Constitutional: He is oriented to person, place, and time  He appears well-developed and well-nourished  HENT:   Head: Normocephalic and atraumatic  Eyes: Conjunctivae are normal  Pupils are equal, round, and reactive to light  Neck: Normal range of motion  Neck supple  Cardiovascular: Normal rate and regular rhythm  Pulmonary/Chest: Effort normal and breath sounds normal    Abdominal: Soft  Bowel sounds are normal    Musculoskeletal: Normal range of motion  Neurological: He is alert and oriented to person, place, and time  Skin: Skin is warm and dry

## 2018-09-10 NOTE — PROGRESS NOTES
Assessment/Plan:    Healthcare maintenance    Screening for malignant neoplasm of colon  -     Ambulatory referral to Gastroenterology; Future    · Patient Counseling:   ? Nutrition: Stressed importance of a well balanced diet, moderation of sodium/saturated fat, caloric balance and sufficient intake of fiber  ? Exercise: Stressed the importance of regular exercise with a goal of 150 minutes per week  ? Dental Health: Discussed daily flossing and brushing and regular dental visits      · Immunizations reviewed yes, up to date      Subjective:      Patient ID: Jonnie Sweet is a 58 y o  male  Patient is here for a wellness    Need colonoscopy referral        The following portions of the patient's history were reviewed and updated as appropriate: allergies, current medications, past family history, past medical history, past social history, past surgical history and problem list     Review of Systems   Constitutional: Negative  HENT: Negative  Eyes: Negative  Respiratory: Negative  Cardiovascular: Negative  Gastrointestinal: Negative  Musculoskeletal: Negative  Neurological: Negative  Objective:      /78 (BP Location: Left arm, Patient Position: Sitting, Cuff Size: Large)   Pulse 75   Ht 5' 5" (1 651 m)   Wt 123 kg (271 lb)   SpO2 94%   BMI 45 10 kg/m²          Physical Exam   Constitutional: He is oriented to person, place, and time  He appears well-developed and well-nourished  HENT:   Head: Normocephalic and atraumatic  Eyes: Conjunctivae are normal  Pupils are equal, round, and reactive to light  Neck: Normal range of motion  Neck supple  Cardiovascular: Normal rate and regular rhythm  Pulmonary/Chest: Effort normal and breath sounds normal    Abdominal: Soft  Bowel sounds are normal    Musculoskeletal: Normal range of motion  Neurological: He is alert and oriented to person, place, and time  Skin: Skin is warm and dry

## 2018-09-17 ENCOUNTER — TELEPHONE (OUTPATIENT)
Dept: INTERNAL MEDICINE CLINIC | Facility: CLINIC | Age: 63
End: 2018-09-17

## 2018-09-17 DIAGNOSIS — R25.2 LEG CRAMP: Primary | ICD-10-CM

## 2018-09-17 LAB
ALBUMIN SERPL-MCNC: 4 G/DL (ref 3.6–5.1)
ALBUMIN/GLOB SERPL: 1.4 (CALC) (ref 1–2.5)
ALP SERPL-CCNC: 94 U/L (ref 40–115)
ALT SERPL-CCNC: 24 U/L (ref 9–46)
AST SERPL-CCNC: 18 U/L (ref 10–35)
BILIRUB SERPL-MCNC: 0.5 MG/DL (ref 0.2–1.2)
BUN SERPL-MCNC: 10 MG/DL (ref 7–25)
BUN/CREAT SERPL: NORMAL (CALC) (ref 6–22)
CALCIUM SERPL-MCNC: 9.2 MG/DL (ref 8.6–10.3)
CHLORIDE SERPL-SCNC: 104 MMOL/L (ref 98–110)
CHOLEST SERPL-MCNC: 171 MG/DL
CHOLEST/HDLC SERPL: 4.2 (CALC)
CO2 SERPL-SCNC: 30 MMOL/L (ref 20–32)
CREAT SERPL-MCNC: 0.74 MG/DL (ref 0.7–1.25)
EST. AVERAGE GLUCOSE BLD GHB EST-MCNC: 117 (CALC)
EST. AVERAGE GLUCOSE BLD GHB EST-SCNC: 6.5 (CALC)
GLOBULIN SER CALC-MCNC: 2.9 G/DL (CALC) (ref 1.9–3.7)
GLUCOSE SERPL-MCNC: 96 MG/DL (ref 65–99)
HBA1C MFR BLD: 5.7 % OF TOTAL HGB
HDLC SERPL-MCNC: 41 MG/DL
LDLC SERPL CALC-MCNC: 103 MG/DL (CALC)
NONHDLC SERPL-MCNC: 130 MG/DL (CALC)
POTASSIUM SERPL-SCNC: 3.9 MMOL/L (ref 3.5–5.3)
PROT SERPL-MCNC: 6.9 G/DL (ref 6.1–8.1)
SL AMB EGFR AFRICAN AMERICAN: 113 ML/MIN/1.73M2
SL AMB EGFR NON AFRICAN AMERICAN: 97 ML/MIN/1.73M2
SODIUM SERPL-SCNC: 140 MMOL/L (ref 135–146)
TRIGL SERPL-MCNC: 153 MG/DL

## 2018-09-17 NOTE — TELEPHONE ENCOUNTER
Patient states that he is still having cramping in legs  He has been off the Crestor for about 1 week and wants to know if he should stay off of it or not  Please advise

## 2018-09-20 LAB
BASOPHILS # BLD AUTO: 73 CELLS/UL (ref 0–200)
BASOPHILS NFR BLD AUTO: 1.2 %
CK MB CFR SERPL ELPH: 2.5 NG/ML (ref 0–5)
EOSINOPHIL # BLD AUTO: 244 CELLS/UL (ref 15–500)
EOSINOPHIL NFR BLD AUTO: 4 %
ERYTHROCYTE [DISTWIDTH] IN BLOOD BY AUTOMATED COUNT: 12.5 % (ref 11–15)
HCT VFR BLD AUTO: 43.4 % (ref 38.5–50)
HGB BLD-MCNC: 14.9 G/DL (ref 13.2–17.1)
IRON SATN MFR SERPL: 26 % (CALC) (ref 15–60)
IRON SERPL-MCNC: 86 MCG/DL (ref 50–180)
LYMPHOCYTES # BLD AUTO: 2501 CELLS/UL (ref 850–3900)
LYMPHOCYTES NFR BLD AUTO: 41 %
MCH RBC QN AUTO: 31.6 PG (ref 27–33)
MCHC RBC AUTO-ENTMCNC: 34.3 G/DL (ref 32–36)
MCV RBC AUTO: 92.1 FL (ref 80–100)
MONOCYTES # BLD AUTO: 702 CELLS/UL (ref 200–950)
MONOCYTES NFR BLD AUTO: 11.5 %
NEUTROPHILS # BLD AUTO: 2580 CELLS/UL (ref 1500–7800)
NEUTROPHILS NFR BLD AUTO: 42.3 %
PLATELET # BLD AUTO: 243 THOUSAND/UL (ref 140–400)
PMV BLD REES-ECKER: 10.8 FL (ref 7.5–12.5)
RBC # BLD AUTO: 4.71 MILLION/UL (ref 4.2–5.8)
TIBC SERPL-MCNC: 330 MCG/DL (CALC) (ref 250–425)
WBC # BLD AUTO: 6.1 THOUSAND/UL (ref 3.8–10.8)

## 2018-09-24 NOTE — TELEPHONE ENCOUNTER
Patient called in requesting the results of the labs Nisha ordered on this date? Patient said he did the labs but has not been given the results of directions as to what her should be doing at this point      Please advise

## 2018-09-24 NOTE — TELEPHONE ENCOUNTER
Patient said his leg cramps are getting better, he asked if this is because he is taking Crestor? He said he has been on the Crestor for a few month now

## 2018-10-19 DIAGNOSIS — E78.5 HYPERLIPIDEMIA, UNSPECIFIED HYPERLIPIDEMIA TYPE: Primary | ICD-10-CM

## 2018-10-20 RX ORDER — EZETIMIBE 10 MG/1
TABLET ORAL
Qty: 90 TABLET | Refills: 3 | Status: SHIPPED | OUTPATIENT
Start: 2018-10-20

## 2018-10-31 PROBLEM — K62.5 RECTAL BLEEDING: Status: ACTIVE | Noted: 2018-10-31

## 2018-11-05 PROBLEM — Z12.11 SPECIAL SCREENING FOR MALIGNANT NEOPLASMS, COLON: Status: ACTIVE | Noted: 2018-11-05

## 2018-11-14 ENCOUNTER — ANESTHESIA EVENT (OUTPATIENT)
Dept: GASTROENTEROLOGY | Facility: HOSPITAL | Age: 63
End: 2018-11-14
Payer: COMMERCIAL

## 2018-11-14 ENCOUNTER — ANESTHESIA (OUTPATIENT)
Dept: GASTROENTEROLOGY | Facility: HOSPITAL | Age: 63
End: 2018-11-14
Payer: COMMERCIAL

## 2018-11-14 ENCOUNTER — HOSPITAL ENCOUNTER (OUTPATIENT)
Facility: HOSPITAL | Age: 63
Setting detail: OUTPATIENT SURGERY
Discharge: HOME | End: 2018-11-14
Attending: COLON & RECTAL SURGERY | Admitting: COLON & RECTAL SURGERY
Payer: COMMERCIAL

## 2018-11-14 VITALS
RESPIRATION RATE: 16 BRPM | HEART RATE: 68 BPM | TEMPERATURE: 97.6 F | SYSTOLIC BLOOD PRESSURE: 124 MMHG | BODY MASS INDEX: 45.82 KG/M2 | WEIGHT: 275 LBS | DIASTOLIC BLOOD PRESSURE: 65 MMHG | HEIGHT: 65 IN | OXYGEN SATURATION: 95 %

## 2018-11-14 DIAGNOSIS — Z12.11 SPECIAL SCREENING FOR MALIGNANT NEOPLASMS, COLON: ICD-10-CM

## 2018-11-14 PROCEDURE — 88305 TISSUE EXAM BY PATHOLOGIST: CPT | Performed by: PATHOLOGY

## 2018-11-14 PROCEDURE — 45384 COLONOSCOPY W/LESION REMOVAL: CPT | Performed by: COLON & RECTAL SURGERY

## 2018-11-14 RX ORDER — SODIUM CHLORIDE 9 MG/ML
50 INJECTION, SOLUTION INTRAVENOUS CONTINUOUS
Status: DISCONTINUED | OUTPATIENT
Start: 2018-11-14 | End: 2018-11-14 | Stop reason: HOSPADM

## 2018-11-14 RX ORDER — KETAMINE HYDROCHLORIDE 50 MG/ML
INJECTION, SOLUTION, CONCENTRATE INTRAMUSCULAR; INTRAVENOUS AS NEEDED
Status: DISCONTINUED | OUTPATIENT
Start: 2018-11-14 | End: 2018-11-14 | Stop reason: SURG

## 2018-11-14 RX ORDER — PROPOFOL 10 MG/ML
INJECTION, EMULSION INTRAVENOUS AS NEEDED
Status: DISCONTINUED | OUTPATIENT
Start: 2018-11-14 | End: 2018-11-14 | Stop reason: SURG

## 2018-11-14 RX ADMIN — SODIUM CHLORIDE 50 ML/HR: 0.9 INJECTION, SOLUTION INTRAVENOUS at 11:46

## 2018-11-14 RX ADMIN — LIDOCAINE HYDROCHLORIDE 100 MG: 20 INJECTION, SOLUTION INTRAVENOUS at 12:17

## 2018-11-14 RX ADMIN — PROPOFOL 30 MG: 10 INJECTION, EMULSION INTRAVENOUS at 12:23

## 2018-11-14 RX ADMIN — PROPOFOL 30 MG: 10 INJECTION, EMULSION INTRAVENOUS at 12:28

## 2018-11-14 RX ADMIN — PROPOFOL 20 MG: 10 INJECTION, EMULSION INTRAVENOUS at 12:36

## 2018-11-14 RX ADMIN — PROPOFOL 30 MG: 10 INJECTION, EMULSION INTRAVENOUS at 12:26

## 2018-11-14 RX ADMIN — KETAMINE HYDROCHLORIDE 5 MG: 50 INJECTION, SOLUTION INTRAMUSCULAR; INTRAVENOUS at 12:36

## 2018-11-14 RX ADMIN — PROPOFOL 30 MG: 10 INJECTION, EMULSION INTRAVENOUS at 12:21

## 2018-11-14 RX ADMIN — PROPOFOL 30 MG: 10 INJECTION, EMULSION INTRAVENOUS at 12:31

## 2018-11-14 RX ADMIN — PROPOFOL 30 MG: 10 INJECTION, EMULSION INTRAVENOUS at 12:19

## 2018-11-14 RX ADMIN — PROPOFOL 130 MG: 10 INJECTION, EMULSION INTRAVENOUS at 12:17

## 2018-11-14 RX ADMIN — PROPOFOL 40 MG: 10 INJECTION, EMULSION INTRAVENOUS at 12:35

## 2018-11-14 RX ADMIN — KETAMINE HYDROCHLORIDE 25 MG: 50 INJECTION, SOLUTION INTRAMUSCULAR; INTRAVENOUS at 12:17

## 2018-11-14 NOTE — OP NOTE
OPERATIVE REPORT  PATIENT NAME: Ayesha Blanchard    :  1955  MRN: [id number]  Pt Location: [facility]    SURGERY DATE: 2018    Surgeon(s) and Role:     * Allison Azevedo MD - Primary    Preop Diagnosis:  Special screening for malignant neoplasms, colon [Z12 11]    Post-Op Diagnosis Codes: * Special screening for malignant neoplasms, colon [Z12 11]  Colon polyps    Procedure(s) (LRB):  COLONOSCOPY (N/A), hot biopsy polypectomy    Specimen(s):  ID Type Source Tests Collected by Time Destination   1 : Transverse colon polyp-hot bx Tissue Polyp, Colorectal TISSUE EXAM Allison Azevedo MD 2018 1231    2 : Rectosigmoid polyp-hot bx Tissue Polyp, Colorectal TISSUE EXAM Allison Azevedo MD 2018 1238        Estimated Blood Loss:   Minimal    Drains:       Anesthesia Type:   IV Sedation with Anesthesia    Operative Indications:  Special screening for malignant neoplasms, colon [Z12 11]    Operative Findings:  Colon polyps    Complications:   None    Procedure and Technique:  Digital rectal exam was performed and was unremarkable for lesions  Hemorrhoids are redundant but otherwise unremarkable  The prostate was nonnodular  There is scarring around the prostate limiting the exam to some degree  The colonoscope was inserted and advanced to the cecum without any difficulty  The appendiceal orifice, confluence of the tenia coli, and the ileocecal valve were unremarkable  Upon withdrawal of the scope, a single transverse colon polyp was identified  This 1 cm polyp was removed with hot biopsy technique and sent for pathologic evaluation  An additional rectosigmoid polyp was removed with hot biopsy technique  This was also a 1 cm polyp  No other abnormalities or lesions were identified       I was present for the entire procedure    Patient Disposition:  APU     Plan: Colonoscopy in 5 years    SIGNATURE: Allison Azevedo MD  DATE: 2018  TIME: 12:41 PM

## 2018-11-14 NOTE — ANESTHESIA PREPROCEDURE EVALUATION
Review of Systems/Medical History  Patient summary reviewed  Chart reviewed  No history of anesthetic complications     Cardiovascular  Hyperlipidemia, Hypertension , CAD ,    Pulmonary  Asthma , Sleep apnea ,        GI/Hepatic    GERD ,        Negative  ROS        Endo/Other  Negative endo/other ROS      GYN  Negative gynecology ROS          Hematology  Negative hematology ROS      Musculoskeletal  Negative musculoskeletal ROS        Neurology    TIA, CVA , no residual symptoms,    Psychology   Negative psychology ROS              Physical Exam    Airway    Mallampati score: II         Dental   No notable dental hx     Cardiovascular  Rhythm: regular, Rate: normal,     Pulmonary  Pulmonary exam normal     Other Findings        Anesthesia Plan  ASA Score- 2     Anesthesia Type-     Plan Factors-    Induction-     Postoperative Plan-     Informed Consent-

## 2018-11-14 NOTE — ANESTHESIA POSTPROCEDURE EVALUATION
Post-Op Assessment Note      CV Status:  Stable    Mental Status:  Alert and awake    Hydration Status:  Euvolemic    PONV Controlled:  Controlled    Airway Patency:  Patent and adequate    Post Op Vitals Reviewed: Yes          Staff: CRNA           BP   131/65   Temp      Pulse  75   Resp      SpO2   97%

## 2018-12-01 DIAGNOSIS — I10 ESSENTIAL HYPERTENSION: ICD-10-CM

## 2018-12-02 RX ORDER — VALSARTAN 320 MG/1
TABLET ORAL
Qty: 30 TABLET | Refills: 5 | Status: SHIPPED | OUTPATIENT
Start: 2018-12-02 | End: 2019-05-11 | Stop reason: SDUPTHER

## 2019-01-02 RX ORDER — OXYCODONE HYDROCHLORIDE 5 MG/1
5-10 CAPSULE ORAL EVERY 4 HOURS PRN
COMMUNITY
Start: 2016-07-29 | End: 2019-01-03 | Stop reason: ALTCHOICE

## 2019-01-03 ENCOUNTER — OFFICE VISIT (OUTPATIENT)
Dept: INTERNAL MEDICINE CLINIC | Facility: CLINIC | Age: 64
End: 2019-01-03
Payer: COMMERCIAL

## 2019-01-03 VITALS
OXYGEN SATURATION: 95 % | RESPIRATION RATE: 18 BRPM | BODY MASS INDEX: 47.55 KG/M2 | HEIGHT: 65 IN | WEIGHT: 285.4 LBS | SYSTOLIC BLOOD PRESSURE: 136 MMHG | TEMPERATURE: 97.5 F | HEART RATE: 74 BPM | DIASTOLIC BLOOD PRESSURE: 64 MMHG

## 2019-01-03 DIAGNOSIS — J01.00 ACUTE NON-RECURRENT MAXILLARY SINUSITIS: Primary | ICD-10-CM

## 2019-01-03 DIAGNOSIS — I10 BENIGN ESSENTIAL HYPERTENSION: ICD-10-CM

## 2019-01-03 PROCEDURE — 3075F SYST BP GE 130 - 139MM HG: CPT | Performed by: NURSE PRACTITIONER

## 2019-01-03 PROCEDURE — 99213 OFFICE O/P EST LOW 20 MIN: CPT | Performed by: NURSE PRACTITIONER

## 2019-01-03 PROCEDURE — 3078F DIAST BP <80 MM HG: CPT | Performed by: NURSE PRACTITIONER

## 2019-01-03 RX ORDER — AMOXICILLIN AND CLAVULANATE POTASSIUM 875; 125 MG/1; MG/1
1 TABLET, FILM COATED ORAL EVERY 12 HOURS SCHEDULED
Qty: 20 TABLET | Refills: 0 | Status: SHIPPED | OUTPATIENT
Start: 2019-01-03 | End: 2019-01-23 | Stop reason: SDUPTHER

## 2019-01-03 NOTE — PROGRESS NOTES
Assessment/Plan:    Continue coricidin HBP as needed  Take antibiotics as prescribed  Flonase 1 spray in each nostril daily X 10 days  RTO for worsening symptoms or if not improving in 1 week  Diagnoses and all orders for this visit:    Acute non-recurrent maxillary sinusitis  -     amoxicillin-clavulanate (AUGMENTIN) 875-125 mg per tablet; Take 1 tablet by mouth every 12 (twelve) hours for 10 days    Benign essential hypertension    Other orders  -     Discontinue: oxyCODONE (OXY-IR) 5 MG capsule; Take 5-10 mg by mouth every 4 (four) hours as needed          Subjective:      Patient ID: Estlea Harman is a 58 y o  male  Here for sinus congestion  Started on 12/20- about 2 weeks ago   +PND, sinus congestion, ear pressure, headache, facial pain   Taking corcidin HBP with minimal relief   Denies any fevers        The following portions of the patient's history were reviewed and updated as appropriate: allergies, current medications, past family history, past medical history, past social history, past surgical history and problem list     Review of Systems   Constitutional: Negative  HENT: Positive for congestion, ear pain, postnasal drip, sinus pain and sinus pressure  Respiratory: Positive for cough  Cardiovascular: Negative  Gastrointestinal: Negative  Musculoskeletal: Negative for myalgias  Neurological: Positive for headaches  Objective:      /64   Pulse 74   Temp 97 5 °F (36 4 °C) (Tympanic)   Resp 18   Ht 5' 5" (1 651 m)   Wt 129 kg (285 lb 6 4 oz)   SpO2 95%   BMI 47 49 kg/m²          Physical Exam   Constitutional: He is oriented to person, place, and time  He appears well-developed and well-nourished  HENT:   Nose: Mucosal edema present  Right sinus exhibits maxillary sinus tenderness  Left sinus exhibits maxillary sinus tenderness  Mouth/Throat: Posterior oropharyngeal erythema present  No oropharyngeal exudate or posterior oropharyngeal edema  Cardiovascular: Normal rate, regular rhythm and normal heart sounds  Pulmonary/Chest: Effort normal and breath sounds normal    Neurological: He is alert and oriented to person, place, and time  Psychiatric: He has a normal mood and affect  His behavior is normal    Vitals reviewed

## 2019-01-13 DIAGNOSIS — R60.9 EDEMA, UNSPECIFIED TYPE: Primary | ICD-10-CM

## 2019-01-13 RX ORDER — FUROSEMIDE 20 MG/1
TABLET ORAL
Qty: 90 TABLET | Refills: 3 | Status: SHIPPED | OUTPATIENT
Start: 2019-01-13 | End: 2020-02-16

## 2019-01-23 ENCOUNTER — OFFICE VISIT (OUTPATIENT)
Dept: INTERNAL MEDICINE CLINIC | Facility: CLINIC | Age: 64
End: 2019-01-23
Payer: COMMERCIAL

## 2019-01-23 VITALS
DIASTOLIC BLOOD PRESSURE: 70 MMHG | HEIGHT: 65 IN | BODY MASS INDEX: 48.18 KG/M2 | TEMPERATURE: 97.8 F | RESPIRATION RATE: 16 BRPM | WEIGHT: 289.2 LBS | SYSTOLIC BLOOD PRESSURE: 120 MMHG | HEART RATE: 68 BPM | OXYGEN SATURATION: 98 %

## 2019-01-23 DIAGNOSIS — R06.2 WHEEZE: ICD-10-CM

## 2019-01-23 DIAGNOSIS — R05.9 COUGH: Primary | ICD-10-CM

## 2019-01-23 DIAGNOSIS — J01.00 ACUTE NON-RECURRENT MAXILLARY SINUSITIS: ICD-10-CM

## 2019-01-23 PROCEDURE — 99213 OFFICE O/P EST LOW 20 MIN: CPT | Performed by: INTERNAL MEDICINE

## 2019-01-23 PROCEDURE — 87631 RESP VIRUS 3-5 TARGETS: CPT | Performed by: INTERNAL MEDICINE

## 2019-01-23 PROCEDURE — 87801 DETECT AGNT MULT DNA AMPLI: CPT | Performed by: INTERNAL MEDICINE

## 2019-01-23 RX ORDER — METHYLPREDNISOLONE 4 MG/1
TABLET ORAL
Qty: 1 EACH | Refills: 0 | Status: SHIPPED | OUTPATIENT
Start: 2019-01-23 | End: 2019-11-14 | Stop reason: HOSPADM

## 2019-01-23 RX ORDER — ALBUTEROL SULFATE 90 UG/1
2 AEROSOL, METERED RESPIRATORY (INHALATION) EVERY 6 HOURS PRN
Qty: 8.5 G | Refills: 2 | Status: SHIPPED | OUTPATIENT
Start: 2019-01-23 | End: 2019-11-25 | Stop reason: ALTCHOICE

## 2019-01-23 RX ORDER — ALBUTEROL SULFATE 2.5 MG/3ML
2.5 SOLUTION RESPIRATORY (INHALATION) EVERY 6 HOURS PRN
Qty: 100 VIAL | Refills: 1 | Status: SHIPPED | OUTPATIENT
Start: 2019-01-23 | End: 2019-11-25 | Stop reason: ALTCHOICE

## 2019-01-23 RX ORDER — AMOXICILLIN AND CLAVULANATE POTASSIUM 875; 125 MG/1; MG/1
1 TABLET, FILM COATED ORAL EVERY 12 HOURS SCHEDULED
Qty: 20 TABLET | Refills: 0 | Status: SHIPPED | OUTPATIENT
Start: 2019-01-23 | End: 2019-02-02

## 2019-01-23 NOTE — LETTER
January 23, 2019     Patient: Konstantin Patel   YOB: 1955   Date of Visit: 1/23/2019       To Whom it May Concern:    Konstantin Patel is under my professional care  He was seen in my office on 1/23/2019  He may return to work on 01/28/2019  If you have any questions or concerns, please don't hesitate to call           Sincerely,          Stacia Matthew,         CC: No Recipients

## 2019-01-23 NOTE — PROGRESS NOTES
Assessment/Plan:           Problem List Items Addressed This Visit        Respiratory    Acute maxillary sinusitis     Will treat with Augmentin he reports me this medicine he tolerates in did very well with his initial symptoms he would like a retreat with Augmentin but he understands if the symptoms not improve then I will consider possibly Levaquin which he did not want today         Relevant Medications    amoxicillin-clavulanate (AUGMENTIN) 875-125 mg per tablet    Other Relevant Orders    Bordetella pertussis / parapertussis PCR (Completed)       Other    Cough - Primary    Relevant Medications    albuterol (2 5 mg/3 mL) 0 083 % nebulizer solution    methylPREDNISolone 4 MG tablet therapy pack    Other Relevant Orders    Nebulizer    Nebulizer Supplies    XR chest pa & lateral    INFLUENZA A/B AND RSV, PCR (Completed)    Bordetella pertussis / parapertussis PCR (Completed)    Wheeze     Asthmatic bronchitis-Will treat with Medrol Dosepak, updraft nebulizer with albuterol every 4-6 hours as needed get Mucinex as directed p r n  Plenty of fluids, rest if the symptoms not improved please notify me  Relevant Medications    albuterol (2 5 mg/3 mL) 0 083 % nebulizer solution    methylPREDNISolone 4 MG tablet therapy pack    albuterol (PROAIR HFA) 90 mcg/act inhaler    Other Relevant Orders    Nebulizer    Nebulizer Supplies    XR chest pa & lateral    Bordetella pertussis / parapertussis PCR (Completed)          RTO as scheduled he is to call me if his symptoms are not improving next 3-4 days or if worse go to ER  Subjective:      Patient ID: Leland Villalba is a 58 y o  male  HPI 65-year-old male chief complaint asthmatic bronchitis/sinus infection, cough and wheeze;   The pt got cold sx after reports me initially had congestion of postnasal drip, coricidin HBP a cough and cold which did slow down but did not die resolve the situation 1 to top see the help bilateral have pressure and pain of the frontal, ethmoid and bilateral ear pain he reports colorful cream mucus he developed a come in with the nurse practition was prescribed Augmentin the day I failed he felt better within 24 hours and he completed the 10 day course  The the pt continued to have pnd cont flonase and coricidin, after the med went to chest 2-3 day , coughing fits,big glob of mucous make s the pt cough white gooey thick mucous  Tried proair no f/c  The following portions of the patient's history were reviewed and updated as appropriate: allergies, current medications, past family history, past medical history, past social history, past surgical history and problem list     Review of Systems   Constitutional: Positive for activity change  Negative for appetite change, chills and fever  HENT: Positive for congestion, postnasal drip and sore throat  Negative for sinus pain and sinus pressure  Respiratory: Positive for cough and wheezing  Gastrointestinal: Negative for diarrhea  Hematological: Negative for adenopathy  Objective:    No Follow-up on file  No results found  Allergies   Allergen Reactions    Omnipaque [Iohexol] Anaphylaxis    Erythromycin      Other reaction(s):  Other (See Comments)  Pleurisy     Iodine      Other reaction(s): Respiratory Distress    Lisinopril Cough    Statins Myalgia       Past Medical History:   Diagnosis Date    Allergic rhinitis due to pollen     last assessed 10/01/15    Aortic aneurysm (HCC)     4 3 cm  6/2018 last check    CPAP (continuous positive airway pressure) dependence     GERD (gastroesophageal reflux disease)     Hyperlipidemia     Hypertension     Mild intermittent asthma without complication     last assessed 10/01/15    Positive PPD     last assesssed 12/21/15    Sleep apnea     [facility]roke [facility])     Syncopal episodes     1 year ago    Vertigo     last assessed 04/24/17     Past Surgical History:   Procedure Laterality Date    COLONOSCOPY N/A 11/14/2018 Procedure: COLONOSCOPY;  Surgeon: Audi Lawrence MD;  Location: [facility]; Service: Colorectal    EGD AND COLONOSCOPY      HERNIA REPAIR      JOINT REPLACEMENT Left     knee    TOTAL KNEE ARTHROPLASTY  08/29/2016    Dr Amezcua July 07/28/16    URETHRA SURGERY      polyps removed     Current Outpatient Prescriptions on File Prior to Visit   Medication Sig Dispense Refill    amLODIPine (NORVASC) 10 mg tablet Take 5 mg by mouth daily        aspirin 81 MG tablet Take 162 mg by mouth        Cholecalciferol 1000 UNIT/10ML LIQD Take 2,000 Units by mouth        clopidogrel (PLAVIX) 75 mg tablet TAKE 1 TABLET (75 MG TOTAL) BY MOUTH DAILY  11    Coenzyme Q10 (COQ-10) 100 MG CAPS Take by mouth      ezetimibe (ZETIA) 10 mg tablet TAKE 1 TABLET BY MOUTH EVERY DAY 90 tablet 3    fluticasone (FLONASE) 50 mcg/act nasal spray 2 sprays into each nostril daily      furosemide (LASIX) 20 mg tablet TAKE 1 TABLET (20 MG TOTAL) BY MOUTH DAILY  90 tablet 3    KLOR-CON M10 10 MEQ tablet Take 10 mEq by mouth daily  11    metoprolol succinate (TOPROL-XL) 25 mg 24 hr tablet TAKE 1 TABLET (25 MG TOTAL) BY MOUTH DAILY  11    multivitamin (THERAGRAN) TABS Take 1 tablet by mouth      Omega-3 Fatty Acids (FISH OIL PO) Take 2 tablets by mouth daily      Probiotic Product (PROBIOTIC-10 PO) Take 2 tablets by mouth daily      ranitidine (ZANTAC) 150 mg tablet Take 150 mg by mouth      valsartan (DIOVAN) 320 MG tablet TAKE 1 TABLET DAILY 30 tablet 5     No current facility-administered medications on file prior to visit  Family History   Problem Relation Age of Onset    Coronary artery disease Father     Hypertension Father     Heart disease Father         cardiac disorder     Social History     Social History    Marital status: /Civil Union     Spouse name: N/A    Number of children: 2    Years of education: N/A     Occupational History    Not on file       Social History Main Topics    Smoking status: Never Smoker    Smokeless tobacco: Never Used    Alcohol use No    Drug use: No    Sexual activity: Not on file     Other Topics Concern    Not on file     Social History Narrative    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[employer]:    01/23/19 1723   BP: 120/70   BP Location: Right arm   Patient Position: Sitting   Cuff Size: Standard   Pulse: 68   Resp: 16   Temp: 97 8 °F (36 6 °C)   TempSrc: Oral   SpO2: 98%   Weight: 131 kg (289 lb 3 2 oz)   Height: 5' 5" (1 651 m)     Results for orders placed or performed in visit on 01/23/19   INFLUENZA A/B AND RSV, PCR   Result Value Ref Range    INFLU A PCR None Detected None Detected    INFLU B PCR None Detected None Detected    RSV PCR None Detected None Detected   Bordetella pertussis / parapertussis PCR   Result Value Ref Range    Bordetella Pertussis PCR Not Detected Not Detected    BORDETELLA PARAPERTUSSIS PCR Not Detected Not Detected     Weight (last 2 days)     None        Body mass index is 48 13 kg/m²  BP      Temp      Pulse     Resp      SpO2        Vitals:    01/23/19 1723   Weight: 131 kg (289 lb 3 2 oz)     Vitals:    01/23/19 1723   Weight: 131 kg (289 lb 3 2 oz)       /70 (BP Location: Right arm, Patient Position: Sitting, Cuff Size: Standard)   Pulse 68   Temp 97 8 °F (36 6 °C) (Oral)   Resp 16   Ht 5' 5" (1 651 m)   Wt 131 kg (289 lb 3 2 oz)   SpO2 98%   BMI 48 13 kg/m²          Physical Exam   Constitutional: He appears well-developed and well-nourished  No distress  HENT:   Head: Normocephalic and atraumatic  Right Ear: External ear normal    Left Ear: External ear normal    Nose: Nose normal    Mouth/Throat: Oropharynx is clear and moist  No oropharyngeal exudate  Eyes: Pupils are equal, round, and reactive to light  Conjunctivae and EOM are normal  Right eye exhibits no discharge  Left eye exhibits no discharge  No scleral icterus  Cardiovascular: Normal heart sounds  No murmur heard    Pulmonary/Chest: No respiratory distress  He has wheezes ( expiratory wheezing)  He has no rales  Lymphadenopathy:     He has no cervical adenopathy  Skin: He is not diaphoretic

## 2019-01-24 ENCOUNTER — APPOINTMENT (OUTPATIENT)
Dept: RADIOLOGY | Age: 64
End: 2019-01-24
Payer: COMMERCIAL

## 2019-01-24 DIAGNOSIS — R06.2 WHEEZE: ICD-10-CM

## 2019-01-24 DIAGNOSIS — R05.9 COUGH: ICD-10-CM

## 2019-01-24 LAB
FLUAV AG SPEC QL: NORMAL
FLUBV AG SPEC QL: NORMAL
RSV B RNA SPEC QL NAA+PROBE: NORMAL

## 2019-01-24 PROCEDURE — 71046 X-RAY EXAM CHEST 2 VIEWS: CPT

## 2019-01-25 LAB
B PARAPERT DNA SPEC QL NAA+PROBE: NOT DETECTED
B PERT DNA SPEC QL NAA+PROBE: NOT DETECTED

## 2019-01-27 PROBLEM — R05.9 COUGH: Status: ACTIVE | Noted: 2019-01-27

## 2019-01-27 PROBLEM — R06.2 WHEEZE: Status: ACTIVE | Noted: 2019-01-27

## 2019-01-27 NOTE — ASSESSMENT & PLAN NOTE
Asthmatic bronchitis-Will treat with Medrol Dosepak, updraft nebulizer with albuterol every 4-6 hours as needed get Mucinex as directed p r n  Plenty of fluids, rest if the symptoms not improved please notify me

## 2019-01-27 NOTE — ASSESSMENT & PLAN NOTE
Will treat with Augmentin he reports me this medicine he tolerates in did very well with his initial symptoms he would like a retreat with Augmentin but he understands if the symptoms not improve then I will consider possibly Levaquin which he did not want today

## 2019-01-28 ENCOUNTER — TELEPHONE (OUTPATIENT)
Dept: INTERNAL MEDICINE CLINIC | Facility: CLINIC | Age: 64
End: 2019-01-28

## 2019-01-28 NOTE — TELEPHONE ENCOUNTER
Patient is asking if you can extend his work note until Thursday  Patient is still coughing and not feeling 100% yet        Please advise

## 2019-01-30 ENCOUNTER — TELEPHONE (OUTPATIENT)
Dept: INTERNAL MEDICINE CLINIC | Facility: CLINIC | Age: 64
End: 2019-01-30

## 2019-01-30 DIAGNOSIS — R93.89 ABNORMAL CHEST X-RAY: Primary | ICD-10-CM

## 2019-01-31 ENCOUNTER — APPOINTMENT (OUTPATIENT)
Dept: RADIOLOGY | Age: 64
End: 2019-01-31
Payer: COMMERCIAL

## 2019-01-31 DIAGNOSIS — R93.89 ABNORMAL CHEST X-RAY: ICD-10-CM

## 2019-01-31 PROCEDURE — 71046 X-RAY EXAM CHEST 2 VIEWS: CPT

## 2019-02-01 ENCOUNTER — OFFICE VISIT (OUTPATIENT)
Dept: INTERNAL MEDICINE CLINIC | Facility: CLINIC | Age: 64
End: 2019-02-01
Payer: COMMERCIAL

## 2019-02-01 VITALS
WEIGHT: 286.6 LBS | OXYGEN SATURATION: 94 % | HEIGHT: 65 IN | TEMPERATURE: 98.6 F | HEART RATE: 75 BPM | BODY MASS INDEX: 47.75 KG/M2 | SYSTOLIC BLOOD PRESSURE: 138 MMHG | DIASTOLIC BLOOD PRESSURE: 70 MMHG

## 2019-02-01 DIAGNOSIS — R22.2 MASS IN CHEST: Primary | ICD-10-CM

## 2019-02-01 PROCEDURE — 99213 OFFICE O/P EST LOW 20 MIN: CPT | Performed by: INTERNAL MEDICINE

## 2019-02-01 NOTE — PROGRESS NOTES
Assessment/Plan:           Problem List Items Addressed This Visit        Other    Mass in chest - Primary     The patient is accompanied with his wife today I reviewed the chest x-ray in detail with the patient because of his history of anaphylaxis with IV contrast, I have discussed the case with radiologist and they recommend checking a CT scan of the chest without IV contrast, if the CT scan is unable to help identify the problem he may need to undergo MRI testing  His cold symptoms are improving he has completed the antibiotic may continue with the inhaler/Mucinex         Relevant Orders    CT chest wo contrast          Return to office  2 weeks  call if any problems  Subjective:      Patient ID: Aimee Martin is a 58 y o  male  HPI 27-year-old male coming in regarding the abnormal chest x-ray and resolving cold high; nonsmoker  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    The following portions of the patient's history were reviewed and updated as appropriate: allergies, current medications, past family history, past medical history, past social history, past surgical history and problem list     Review of Systems   Constitutional: Negative for chills and fever  HENT: Positive for congestion  Respiratory: Positive for cough and wheezing  Negative for shortness of breath  Objective:    No Follow-up on file  Procedure: Xr Chest Pa & Lateral    Result Date: 1/31/2019  Narrative: CHEST INDICATION:   R93 89: Abnormal findings on diagnostic imaging of other specified body structures  Previous x-ray performed on January 24 COMPARISON:  January 24, 2019 EXAM PERFORMED/VIEWS:  XR CHEST PA & LATERAL  The frontal view was performed utilizing dual energy radiographic technique  Images: 7 FINDINGS: Cardiomediastinal silhouette appears unremarkable  Soft tissue dense mass previous study described is again noted  This overlies the anterior aspect of the left 6th rib    This was not seen on the old study dated March 13, 2017  Osseous structures appear within normal limits for patient age  Impression: Soft tissue mass of the left chest wall  CT scan, with contrast, would be the imaging modality of choice, in evaluation of masses of the chest  Workstation performed: [id number]     Procedure: Xr Chest Pa & Lateral    Result Date: 1/29/2019  Narrative: CHEST INDICATION:   R05: Cough  R06 2: Wheezing  COMPARISON:  3/13/2017, CT chest 5/9/2011 EXAM PERFORMED/VIEWS:  XR CHEST PA & LATERAL  The frontal view was performed utilizing dual energy radiographic technique  Images: 5 FINDINGS: Cardiomediastinal silhouette appears unremarkable  The lungs are clear  There is a smooth curvilinear density seen along the peripheral inferolateral left hemithorax only on the frontal projection  I suspect this is related to the chest wall/breast shadow as opposed to a pleural density  No pneumothorax or pleural effusion  Osseous structures appear within normal limits for patient age  Impression: No acute cardiopulmonary disease  Smooth, curvilinear density inferolateral left hemithorax seen only on frontal projection favored to be related to the chest wall/breast shadow as opposed to a pleural-based density  Follow-up chest film with shallow Turkish projection recommended for confirmation  The study was marked in EPIC for significant notification  Workstation performed: [id number]         Allergies   Allergen Reactions    Omnipaque [Iohexol] Anaphylaxis    Erythromycin      Other reaction(s):  Other (See Comments)  Pleurisy     Iodine      Other reaction(s): Respiratory Distress    Lisinopril Cough    Statins Myalgia       Past Medical History:   Diagnosis Date    Allergic rhinitis due to pollen     last assessed 10/01/15    Aortic aneurysm (HCC)     4 3 cm  6/2018 last check    CPAP (continuous positive airway pressure) dependence     GERD (gastroesophageal reflux disease)     Hyperlipidemia     Hypertension     Mild intermittent asthma without complication     last assessed 10/01/15    Positive PPD     last assesssed 12/21/15    Sleep apnea     [facility]roke [facility])     Syncopal episodes     1 year ago    Vertigo     last assessed 17     Past Surgical History:   Procedure Laterality Date    COLONOSCOPY N/A 2018    Procedure: COLONOSCOPY;  Surgeon: Kannan Gonzalez MD;  Location:  GI LAB; Service: Colorectal    EGD AND COLONOSCOPY      HERNIA REPAIR      JOINT REPLACEMENT Left     knee    TOTAL KNEE ARTHROPLASTY  2016    Dr Mary Thurston 16    URETHRA SURGERY      polyps removed     Current Outpatient Prescriptions on File Prior to Visit   Medication Sig Dispense Refill    albuterol (2 5 mg/3 mL) 0 083 % nebulizer solution Take 1 vial (2 5 mg total) by nebulization every 6 (six) hours as needed for wheezing or shortness of breath 100 vial 1    albuterol (PROAIR HFA) 90 mcg/act inhaler Inhale 2 puffs every 6 (six) hours as needed for wheezing 8 5 g 2    amLODIPine (NORVASC) 10 mg tablet Take 5 mg by mouth daily        [] amoxicillin-clavulanate (AUGMENTIN) 875-125 mg per tablet Take 1 tablet by mouth every 12 (twelve) hours for 10 days 20 tablet 0    aspirin 81 MG tablet Take 162 mg by mouth        Cholecalciferol 1000 UNIT/10ML LIQD Take 2,000 Units by mouth        clopidogrel (PLAVIX) 75 mg tablet TAKE 1 TABLET (75 MG TOTAL) BY MOUTH DAILY  11    Coenzyme Q10 (COQ-10) 100 MG CAPS Take by mouth      ezetimibe (ZETIA) 10 mg tablet TAKE 1 TABLET BY MOUTH EVERY DAY 90 tablet 3    fluticasone (FLONASE) 50 mcg/act nasal spray 2 sprays into each nostril daily      furosemide (LASIX) 20 mg tablet TAKE 1 TABLET (20 MG TOTAL) BY MOUTH DAILY  90 tablet 3    KLOR-CON M10 10 MEQ tablet Take 10 mEq by mouth daily  11    metoprolol succinate (TOPROL-XL) 25 mg 24 hr tablet TAKE 1 TABLET (25 MG TOTAL) BY MOUTH DAILY    11    multivitamin (THERAGRAN) TABS Take 1 tablet by mouth      Omega-3 Fatty Acids (FISH OIL PO) Take 2 tablets by mouth daily      Probiotic Product (PROBIOTIC-10 PO) Take 2 tablets by mouth daily      ranitidine (ZANTAC) 150 mg tablet Take 150 mg by mouth      valsartan (DIOVAN) 320 MG tablet TAKE 1 TABLET DAILY 30 tablet 5    methylPREDNISolone 4 MG tablet therapy pack Use as directed on package (Patient not taking: Reported on 2/1/2019 ) 1 each 0     No current facility-administered medications on file prior to visit  Family History   Problem Relation Age of Onset    Coronary artery disease Father     Hypertension Father     Heart disease Father         cardiac disorder     Social History     Social History    Marital status: /Civil Union     Spouse name: N/A    Number of children: 2    Years of education: N/A     Occupational History    Not on file  Social History Main Topics    Smoking status: Never Smoker    Smokeless tobacco: Never Used    Alcohol use No    Drug use: No    Sexual activity: Not on file     Other Topics Concern    Not on file     Social History Narrative    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[employer]:    02/01/19 1439   BP: 138/70   Pulse: 75   Temp: 98 6 °F (37 °C)   SpO2: 94%   Weight: 130 kg (286 lb 9 6 oz)   Height: 5' 5" (1 651 m)     Results for orders placed or performed in visit on 01/23/19   INFLUENZA A/B AND RSV, PCR   Result Value Ref Range    INFLU A PCR None Detected None Detected    INFLU B PCR None Detected None Detected    RSV PCR None Detected None Detected   Bordetella pertussis / parapertussis PCR   Result Value Ref Range    Bordetella Pertussis PCR Not Detected Not Detected    BORDETELLA PARAPERTUSSIS PCR Not Detected Not Detected     Weight (last 2 days)     Date/Time   Weight    02/01/19 1439  130 (286 6)            Body mass index is 47 69 kg/m²    BP      Temp      Pulse     Resp      SpO2        Vitals:    02/01/19 1439   Weight: 130 kg (286 lb 9 6 oz)     Vitals:    02/01/19 1439 Weight: 130 kg (286 lb 9 6 oz)       /70   Pulse 75   Temp 98 6 °F (37 °C)   Ht 5' 5" (1 651 m)   Wt 130 kg (286 lb 9 6 oz)   SpO2 94%   BMI 47 69 kg/m²          Physical Exam   Constitutional: He appears well-developed and well-nourished  No distress  HENT:   Head: Normocephalic and atraumatic  Right Ear: External ear normal    Left Ear: External ear normal    Nose: Nose normal    Mouth/Throat: Oropharynx is clear and moist  No oropharyngeal exudate  Eyes: Pupils are equal, round, and reactive to light  Conjunctivae and EOM are normal  Right eye exhibits no discharge  Left eye exhibits no discharge  No scleral icterus  Cardiovascular: Normal heart sounds  No murmur heard  Pulmonary/Chest: No respiratory distress  He has no wheezes  He has no rales  Lymphadenopathy:     He has no cervical adenopathy  Skin: He is not diaphoretic

## 2019-02-03 PROBLEM — R22.2 MASS IN CHEST: Status: ACTIVE | Noted: 2019-02-03

## 2019-02-03 NOTE — ASSESSMENT & PLAN NOTE
The patient is accompanied with his wife today I reviewed the chest x-ray in detail with the patient because of his history of anaphylaxis with IV contrast, I have discussed the case with radiologist and they recommend checking a CT scan of the chest without IV contrast, if the CT scan is unable to help identify the problem he may need to undergo MRI testing    His cold symptoms are improving he has completed the antibiotic may continue with the inhaler/Mucinex

## 2019-02-08 ENCOUNTER — HOSPITAL ENCOUNTER (OUTPATIENT)
Dept: CT IMAGING | Facility: HOSPITAL | Age: 64
Discharge: HOME | End: 2019-02-08
Payer: COMMERCIAL

## 2019-02-08 DIAGNOSIS — R22.2 MASS IN CHEST: ICD-10-CM

## 2019-02-08 PROCEDURE — 71250 CT THORAX DX C-: CPT

## 2019-02-13 ENCOUNTER — TELEPHONE (OUTPATIENT)
Dept: INTERNAL MEDICINE CLINIC | Facility: CLINIC | Age: 64
End: 2019-02-13

## 2019-02-14 ENCOUNTER — TELEPHONE (OUTPATIENT)
Dept: INTERNAL MEDICINE CLINIC | Facility: CLINIC | Age: 64
End: 2019-02-14

## 2019-02-18 ENCOUNTER — TELEPHONE (OUTPATIENT)
Dept: INTERNAL MEDICINE CLINIC | Facility: CLINIC | Age: 64
End: 2019-02-18

## 2019-02-18 DIAGNOSIS — R91.1 LUNG NODULE: Primary | ICD-10-CM

## 2019-02-18 NOTE — TELEPHONE ENCOUNTER
Patient stopped in today with questions about his Xrays and CT completed recently  He had concerns and wanted a call back from you directly  He said he would prefer if you would call him because he is very confused with his results and message of his results that he has already received      Call back is [phone]

## 2019-02-27 ENCOUNTER — OFFICE VISIT (OUTPATIENT)
Dept: PULMONOLOGY | Facility: CLINIC | Age: 64
End: 2019-02-27
Payer: COMMERCIAL

## 2019-02-27 VITALS
WEIGHT: 289 LBS | SYSTOLIC BLOOD PRESSURE: 150 MMHG | OXYGEN SATURATION: 96 % | HEIGHT: 65 IN | BODY MASS INDEX: 48.15 KG/M2 | RESPIRATION RATE: 18 BRPM | DIASTOLIC BLOOD PRESSURE: 80 MMHG | HEART RATE: 81 BPM | TEMPERATURE: 98.2 F

## 2019-02-27 DIAGNOSIS — J32.9 CHRONIC SINUSITIS, UNSPECIFIED LOCATION: Primary | ICD-10-CM

## 2019-02-27 DIAGNOSIS — R91.1 LUNG NODULE: ICD-10-CM

## 2019-02-27 PROCEDURE — 99244 OFF/OP CNSLTJ NEW/EST MOD 40: CPT | Performed by: INTERNAL MEDICINE

## 2019-02-27 NOTE — PROGRESS NOTES
Pulmonary outpatient note   Jojo Sepulveda 58 y o  male MRN: [id number]  2/27/2019      Assessment and plan:  Jojo Sepulveda has the following medical problems:  1  Lung nodule  9 mm perifissural likely lymph node in the left lung, stable since 2011  Never smoker  The patient does not need follow up on this nodule since this is stable in size for the last 8 years, less than 1 cm and on the risk factors for lung cancer  2   Cough  Chronic, most probably secondary to chronic sinusitis  Patient tried multiple medications without significant success  I referred the patient to ear nose and throat specialist for chronic sinusitis consideration of or invasive treatments and/or cultures  Of note, the patient is taking angiotensin receptor blocker which might cause cough in 3% of the cases  I told him to ask his cardiologist if this could be replaced to minimize the risk of this medication causing the chronic cough  No follow-up is needed  I told the patient that if the ear nose and throat cannot solve the cough or do not think the sinusitis is the cause of the cough he should come back to see me for further investigations  Smiley Nguyen MD/PhD,  [facility]      Return if symptoms worsen or fail to improve  History of Present Illness  This is 58y o  year old male with previous medical history of morbid obesity, chronic sinusitis, hypertension who was referred due to lung nodule seen on chest CT and chronic cough  Regarding the nodule, it is 9 mm nodule in the left lung fissure that was seen similar on chest CT from 2011  It was stable since then  He never smoked  Regarding the cough, the patient complains of cough for few months with recurrent sinusitis and nasal discharge  He is being treated with recurrent courses of antibiotics, Flonase, systemic steroids and antihistamines without success  Social history:   Never smoked    Does not drink alcohol  Occupational history:   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  Review of Systems   Constitutional: Negative  HENT: Positive for congestion, postnasal drip, rhinorrhea and sinus pain  Eyes: Negative  Respiratory: Positive for cough  Cardiovascular: Negative  Gastrointestinal: Negative  Endocrine: Negative  Genitourinary: Negative  Musculoskeletal: Negative  Skin: Negative  Allergic/Immunologic: Negative  Neurological: Negative  Hematological: Negative  Psychiatric/Behavioral: Negative  Historical Information   Past Medical History:   Diagnosis Date    Allergic rhinitis due to pollen     last assessed 10/01/15    Aortic aneurysm (HCC)     4 3 cm  6/2018 last check    CPAP (continuous positive airway pressure) dependence     GERD (gastroesophageal reflux disease)     Hyperlipidemia     Hypertension     Mild intermittent asthma without complication     last assessed 10/01/15    Positive PPD     last assesssed 12/21/15    Sleep apnea     [facility]     Syncopal episodes     1 year ago    Vertigo     last assessed 04/24/17     Past Surgical History:   Procedure Laterality Date    COLONOSCOPY N/A 11/14/2018    Procedure: COLONOSCOPY;  Surgeon: Astrid Jacob MD;  Location: [facility];   Service: Colorectal    EGD AND COLONOSCOPY      HERNIA REPAIR      JOINT REPLACEMENT Left     knee    TOTAL KNEE ARTHROPLASTY  08/29/2016    Dr Harini Armas 07/28/16    URETHRA SURGERY      polyps removed     Family History   Problem Relation Age of Onset    Coronary artery disease Father     Hypertension Father     Heart disease Father         cardiac disorder       Meds/Allergies     Current Outpatient Medications:     albuterol (2 5 mg/3 mL) 0 083 % nebulizer solution, Take 1 vial (2 5 mg total) by nebulization every 6 (six) hours as needed for wheezing or shortness of breath, Disp: 100 vial, Rfl: 1    albuterol (PROAIR HFA) 90 mcg/act inhaler, Inhale 2 puffs every 6 (six) hours as needed for wheezing, Disp: 8 5 g, Rfl: 2    amLODIPine (NORVASC) 10 mg tablet, Take 5 mg by mouth daily  , Disp: , Rfl:     aspirin 81 MG tablet, Take 162 mg by mouth  , Disp: , Rfl:     Cholecalciferol 1000 UNIT/10ML LIQD, Take 2,000 Units by mouth  , Disp: , Rfl:     clopidogrel (PLAVIX) 75 mg tablet, TAKE 1 TABLET (75 MG TOTAL) BY MOUTH DAILY  , Disp: , Rfl: 11    Coenzyme Q10 (COQ-10) 100 MG CAPS, Take by mouth, Disp: , Rfl:     ezetimibe (ZETIA) 10 mg tablet, TAKE 1 TABLET BY MOUTH EVERY DAY, Disp: 90 tablet, Rfl: 3    fluticasone (FLONASE) 50 mcg/act nasal spray, 2 sprays into each nostril daily, Disp: , Rfl:     furosemide (LASIX) 20 mg tablet, TAKE 1 TABLET (20 MG TOTAL) BY MOUTH DAILY  , Disp: 90 tablet, Rfl: 3    KLOR-CON M10 10 MEQ tablet, Take 10 mEq by mouth daily, Disp: , Rfl: 11    methylPREDNISolone 4 MG tablet therapy pack, Use as directed on package, Disp: 1 each, Rfl: 0    metoprolol succinate (TOPROL-XL) 25 mg 24 hr tablet, TAKE 1 TABLET (25 MG TOTAL) BY MOUTH DAILY  , Disp: , Rfl: 11    multivitamin (THERAGRAN) TABS, Take 1 tablet by mouth, Disp: , Rfl:     Omega-3 Fatty Acids (FISH OIL PO), Take 2 tablets by mouth daily, Disp: , Rfl:     Probiotic Product (PROBIOTIC-10 PO), Take 2 tablets by mouth daily, Disp: , Rfl:     ranitidine (ZANTAC) 150 mg tablet, Take 150 mg by mouth, Disp: , Rfl:     valsartan (DIOVAN) 320 MG tablet, TAKE 1 TABLET DAILY, Disp: 30 tablet, Rfl: 5  Allergies   Allergen Reactions    Omnipaque [Iohexol] Anaphylaxis    Erythromycin      Other reaction(s): Other (See Comments)  Pleurisy     Iodine      Other reaction(s): Respiratory Distress    Lisinopril Cough    Statins Myalgia       Vitals: Blood pressure 150/80, pulse 81, temperature 98 2 °F (36 8 °C), temperature source Tympanic, resp   rate 18, height 5' 5" (1 651 m), weight 131 kg (289 lb), SpO2 96 %  Body mass index is 48 09 kg/m²  Oxygen Therapy  SpO2: 96 %(room air)    Physical Exam  Physical Exam   Constitutional: He is oriented to person, place, and time  He appears well-developed and well-nourished  Morbid obesity   HENT:   Head: Normocephalic and atraumatic  Red, edematous of pharynx, cobblestone appearance   Eyes: Pupils are equal, round, and reactive to light  EOM are normal    Neck: Normal range of motion  Neck supple  Cardiovascular: Normal rate, regular rhythm and normal heart sounds  Exam reveals no friction rub  No murmur heard  Pulmonary/Chest: Effort normal and breath sounds normal  No stridor  No respiratory distress  Abdominal: Soft  He exhibits no distension  Musculoskeletal: Normal range of motion  He exhibits no edema or deformity  Neurological: He is alert and oriented to person, place, and time  Skin: Skin is warm  Psychiatric: He has a normal mood and affect  Labs: I have personally reviewed pertinent lab results  Lab Results   Component Value Date    WBC 6 1 09/17/2018    HGB 14 9 09/17/2018    HCT 43 4 09/17/2018    MCV 92 1 09/17/2018     09/17/2018     Lab Results   Component Value Date    CALCIUM 9 2 09/15/2018     12/10/2016    K 3 9 09/15/2018    CO2 30 09/15/2018     09/15/2018    BUN 10 09/15/2018    CREATININE 0 75 03/03/2018     No results found for: IGE  Lab Results   Component Value Date    ALT 24 09/15/2018    AST 18 09/15/2018    ALKPHOS 94 09/15/2018    BILITOT 0 8 12/10/2016       Imaging and other studies:   I have personally reviewed pertinent imaging studies in PACS  The patient had chest CT on February 2018 that showed 9 mm fissural nodule along the left oblique fissure  This is stable since 2011 and likely represents a eileen fissural lymph node      Radha Duval MD/PhD,  [facility]

## 2019-02-27 NOTE — LETTER
February 27, 2019     [address] [address]    Patient: Eladio Agrawal   YOB: 1955   Date of Visit: 2/27/2019       Dear Dr Daksha Lazo: Thank you for referring Eladio Agrawal to me for evaluation  Below are my notes for this consultation  If you have questions, please do not hesitate to call me  I look forward to following your patient along with you  Sincerely,        Genaro Limon MD        CC: No Recipients  Genaro Limon MD  2/27/2019  2:55 PM  Sign at close encounter  Pulmonary outpatient note   Eladio Agrawal 58 y o  male MRN: [id number]  2/27/2019      Assessment and plan:  Eladio Agrawal has the following medical problems:  1  Lung nodule  9 mm perifissural likely lymph node in the left lung, stable since 2011  Never smoker  The patient does not need follow up on this nodule since this is stable in size for the last 8 years, less than 1 cm and on the risk factors for lung cancer  2   Cough  Chronic, most probably secondary to chronic sinusitis  Patient tried multiple medications without significant success  I referred the patient to ear nose and throat specialist for chronic sinusitis consideration of or invasive treatments and/or cultures  Of note, the patient is taking angiotensin receptor blocker which might cause cough in 3% of the cases  I told him to ask his cardiologist if this could be replaced to minimize the risk of this medication causing the chronic cough  No follow-up is needed  I told the patient that if the ear nose and throat cannot solve the cough or do not think the sinusitis is the cause of the cough he should come back to see me for further investigations  Genaro Limon MD/PhD,  [facility]      Return if symptoms worsen or fail to improve      History of Present Illness  This is 58y o  year old male with previous medical history of morbid obesity, chronic sinusitis, hypertension who was referred due to lung nodule seen on chest CT and chronic cough  Regarding the nodule, it is 9 mm nodule in the left lung fissure that was seen similar on chest CT from 2011  It was stable since then  He never smoked  Regarding the cough, the patient complains of cough for few months with recurrent sinusitis and nasal discharge  He is being treated with recurrent courses of antibiotics, Flonase, systemic steroids and antihistamines without success  Social history:   Never smoked  Does not drink alcohol  Occupational history:   Works in [employer] in [employer] service  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  Review of Systems   Constitutional: Negative  HENT: Positive for congestion, postnasal drip, rhinorrhea and sinus pain  Eyes: Negative  Respiratory: Positive for cough  Cardiovascular: Negative  Gastrointestinal: Negative  Endocrine: Negative  Genitourinary: Negative  Musculoskeletal: Negative  Skin: Negative  Allergic/Immunologic: Negative  Neurological: Negative  Hematological: Negative  Psychiatric/Behavioral: Negative  Historical Information   Past Medical History:   Diagnosis Date    Allergic rhinitis due to pollen     last assessed 10/01/15    Aortic aneurysm (HCC)     4 3 cm  6/2018 last check    CPAP (continuous positive airway pressure) dependence     GERD (gastroesophageal reflux disease)     Hyperlipidemia     Hypertension     Mild intermittent asthma without complication     last assessed 10/01/15    Positive PPD     last assesssed 12/21/15    Sleep apnea     Stroke [facility])     Syncopal episodes     1 year ago    Vertigo     last assessed 04/24/17     Past Surgical History:   Procedure Laterality Date    COLONOSCOPY N/A 11/14/2018    Procedure: COLONOSCOPY;  Surgeon: Laird Baumgarten, MD;  [facility];   Service: Colorectal    EGD AND COLONOSCOPY      HERNIA REPAIR      JOINT REPLACEMENT Left     knee    TOTAL KNEE ARTHROPLASTY  08/29/2016    Dr Mary Elder 07/28/16    URETHRA SURGERY      polyps removed     Family History   Problem Relation Age of Onset    Coronary artery disease Father     Hypertension Father     Heart disease Father         cardiac disorder       Meds/Allergies     Current Outpatient Medications:     albuterol (2 5 mg/3 mL) 0 083 % nebulizer solution, Take 1 vial (2 5 mg total) by nebulization every 6 (six) hours as needed for wheezing or shortness of breath, Disp: 100 vial, Rfl: 1    albuterol (PROAIR HFA) 90 mcg/act inhaler, Inhale 2 puffs every 6 (six) hours as needed for wheezing, Disp: 8 5 g, Rfl: 2    amLODIPine (NORVASC) 10 mg tablet, Take 5 mg by mouth daily  , Disp: , Rfl:     aspirin 81 MG tablet, Take 162 mg by mouth  , Disp: , Rfl:     Cholecalciferol 1000 UNIT/10ML LIQD, Take 2,000 Units by mouth  , Disp: , Rfl:     clopidogrel (PLAVIX) 75 mg tablet, TAKE 1 TABLET (75 MG TOTAL) BY MOUTH DAILY  , Disp: , Rfl: 11    Coenzyme Q10 (COQ-10) 100 MG CAPS, Take by mouth, Disp: , Rfl:     ezetimibe (ZETIA) 10 mg tablet, TAKE 1 TABLET BY MOUTH EVERY DAY, Disp: 90 tablet, Rfl: 3    fluticasone (FLONASE) 50 mcg/act nasal spray, 2 sprays into each nostril daily, Disp: , Rfl:     furosemide (LASIX) 20 mg tablet, TAKE 1 TABLET (20 MG TOTAL) BY MOUTH DAILY  , Disp: 90 tablet, Rfl: 3    KLOR-CON M10 10 MEQ tablet, Take 10 mEq by mouth daily, Disp: , Rfl: 11    methylPREDNISolone 4 MG tablet therapy pack, Use as directed on package, Disp: 1 each, Rfl: 0    metoprolol succinate (TOPROL-XL) 25 mg 24 hr tablet, TAKE 1 TABLET (25 MG TOTAL) BY MOUTH DAILY  , Disp: , Rfl: 11    multivitamin (THERAGRAN) TABS, Take 1 tablet by mouth, Disp: , Rfl:     Omega-3 Fatty Acids (FISH OIL PO), Take 2 tablets by mouth daily, Disp: , Rfl:     Probiotic Product (PROBIOTIC-10 PO), Take 2 tablets by mouth daily, Disp: , Rfl:     ranitidine (ZANTAC) 150 mg tablet, Take 150 mg by mouth, Disp: , Rfl:     valsartan (DIOVAN) 320 MG tablet, TAKE 1 TABLET DAILY, Disp: 30 tablet, Rfl: 5  Allergies   Allergen Reactions    Omnipaque [Iohexol] Anaphylaxis    Erythromycin      Other reaction(s): Other (See Comments)  Pleurisy     Iodine      Other reaction(s): Respiratory Distress    Lisinopril Cough    Statins Myalgia       Vitals: Blood pressure 150/80, pulse 81, temperature 98 2 °F (36 8 °C), temperature source Tympanic, resp  rate 18, height 5' 5" (1 651 m), weight 131 kg (289 lb), SpO2 96 %  Body mass index is 48 09 kg/m²  Oxygen Therapy  SpO2: 96 %(room air)    Physical Exam  Physical Exam   Constitutional: He is oriented to person, place, and time  He appears well-developed and well-nourished  Morbid obesity   HENT:   Head: Normocephalic and atraumatic  Red, edematous of pharynx, cobblestone appearance   Eyes: Pupils are equal, round, and reactive to light  EOM are normal    Neck: Normal range of motion  Neck supple  Cardiovascular: Normal rate, regular rhythm and normal heart sounds  Exam reveals no friction rub  No murmur heard  Pulmonary/Chest: Effort normal and breath sounds normal  No stridor  No respiratory distress  Abdominal: Soft  He exhibits no distension  Musculoskeletal: Normal range of motion  He exhibits no edema or deformity  Neurological: He is alert and oriented to person, place, and time  Skin: Skin is warm  Psychiatric: He has a normal mood and affect  Labs: I have personally reviewed pertinent lab results    Lab Results   Component Value Date    WBC 6 1 09/17/2018    HGB 14 9 09/17/2018    HCT 43 4 09/17/2018    MCV 92 1 09/17/2018     09/17/2018     Lab Results   Component Value Date    CALCIUM 9 2 09/15/2018     12/10/2016    K 3 9 09/15/2018    CO2 30 09/15/2018     09/15/2018    BUN 10 09/15/2018    CREATININE 0 75 03/03/2018     No results found for: IGE  Lab Results   Component Value Date ALT 24 09/15/2018    AST 18 09/15/2018    ALKPHOS 94 09/15/2018    BILITOT 0 8 12/10/2016       Imaging and other studies:   I have personally reviewed pertinent imaging studies in PACS  The patient had chest CT on February 2018 that showed 9 mm fissural nodule along the left oblique fissure  This is stable since 2011 and likely represents a eileen fissural lymph node      So Perez MD/PhD,  [facility]

## 2019-03-25 ENCOUNTER — OFFICE VISIT (OUTPATIENT)
Dept: INTERNAL MEDICINE CLINIC | Facility: CLINIC | Age: 64
End: 2019-03-25
Payer: COMMERCIAL

## 2019-03-25 VITALS
SYSTOLIC BLOOD PRESSURE: 142 MMHG | BODY MASS INDEX: 47.12 KG/M2 | TEMPERATURE: 98.5 F | OXYGEN SATURATION: 95 % | DIASTOLIC BLOOD PRESSURE: 84 MMHG | HEIGHT: 65 IN | HEART RATE: 68 BPM | WEIGHT: 282.8 LBS

## 2019-03-25 DIAGNOSIS — E78.5 HYPERLIPIDEMIA, UNSPECIFIED HYPERLIPIDEMIA TYPE: ICD-10-CM

## 2019-03-25 DIAGNOSIS — K21.9 GASTROESOPHAGEAL REFLUX DISEASE WITHOUT ESOPHAGITIS: ICD-10-CM

## 2019-03-25 DIAGNOSIS — I10 BENIGN ESSENTIAL HYPERTENSION: ICD-10-CM

## 2019-03-25 DIAGNOSIS — Z12.5 SCREENING PSA (PROSTATE SPECIFIC ANTIGEN): ICD-10-CM

## 2019-03-25 DIAGNOSIS — R19.5 CHANGE IN STOOL: ICD-10-CM

## 2019-03-25 DIAGNOSIS — E66.01 MORBID OBESITY (HCC): ICD-10-CM

## 2019-03-25 DIAGNOSIS — R22.2 MASS IN CHEST: ICD-10-CM

## 2019-03-25 DIAGNOSIS — R73.03 PREDIABETES: Primary | ICD-10-CM

## 2019-03-25 PROCEDURE — 3008F BODY MASS INDEX DOCD: CPT | Performed by: INTERNAL MEDICINE

## 2019-03-25 PROCEDURE — 99214 OFFICE O/P EST MOD 30 MIN: CPT | Performed by: INTERNAL MEDICINE

## 2019-03-25 PROCEDURE — 1036F TOBACCO NON-USER: CPT | Performed by: INTERNAL MEDICINE

## 2019-03-25 NOTE — LETTER
March 25, 2019     Patient: Tarik Daugherty   YOB: 1955   Date of Visit: 3/25/2019       To Whom it May Concern:    Tarik Daugherty is under my professional care  He was seen in my office on 3/25/2019  He may return to work on 3/25/19  If you have any questions or concerns, please don't hesitate to call           Sincerely,          Markie Gooden, DO

## 2019-03-25 NOTE — ASSESSMENT & PLAN NOTE
Weight loss, walking continue with current medical regiment will continue monitor the readings are currently borderline

## 2019-03-25 NOTE — ASSESSMENT & PLAN NOTE
Patient does reports me a change of the color of the stool since starting Prilosec and Gaviscon he is concerned that he is jaundice I will check a comprehensive metabolic panel, I have asked him to stop the gout is gone see if there is improvement in his symptoms

## 2019-03-25 NOTE — ASSESSMENT & PLAN NOTE
Hyperlipidemia controlled patient will discuss with Cardiology if he is a candidate for wrap at but I do not think he is a candidate his LDL cholesterol is only mildly elevated but he is statin intolerant

## 2019-03-25 NOTE — PROGRESS NOTES
BMI Counseling: Body mass index is 47 06 kg/m²  Discussed the patient's BMI with him  The BMI is above average  BMI counseling and education was provided to the patient  Nutrition recommendations include reducing portion sizes  Assessment/Plan:    Benign essential hypertension  Weight loss, walking continue with current medical regiment will continue monitor the readings are currently borderline  Hyperlipidemia  Hyperlipidemia controlled patient will discuss with Cardiology if he is a candidate for wrap at but I do not think he is a candidate his LDL cholesterol is only mildly elevated but he is statin intolerant    Morbid obesity ([facility] [address] )  Obesity -I have counseled patient following healthy and balanced diet, I would like the patient to lose weight, I would like the patient exercise routinely; we will continue monitor the patient's progress  Prediabetes  Pre diabetes -I have counseled the patient to follow a healthy balanced diet, I have counseled patient reduce carbohydrates and sweets in the diet, I would like the patient exercise routinely  I will be checking hemoglobin A1c and comprehensive metabolic panel  Have counseled patient about the prevention of diabetes, and the risk of progression to type 2 diabetes  Screening PSA (prostate specific antigen)  Counseled, check screening PSA    Mass in chest  CT scan was negative for mass patient did see Pulmonary for stable pulmonary nodule the not require any further follow-up  We did review the pulmonary letter together  Change in stool  Patient does reports me a change of the color of the stool since starting Prilosec and Gaviscon he is concerned that he is jaundice I will check a comprehensive metabolic panel, I have asked him to stop the gout is gone see if there is improvement in his symptoms      Gastroesophageal reflux disease without esophagitis  Has found by ENT now on Prilosec and Gaviscon, his symptoms have improved         Problem List Items Addressed This Visit        Cardiovascular and Mediastinum    Benign essential hypertension     Weight loss, walking continue with current medical regiment will continue monitor the readings are currently borderline  Other    Morbid obesity ([address] )     Obesity -I have counseled patient following healthy and balanced diet, I would like the patient to lose weight, I would like the patient exercise routinely; we will continue monitor the patient's progress  Prediabetes - Primary     Pre diabetes -I have counseled the patient to follow a healthy balanced diet, I have counseled patient reduce carbohydrates and sweets in the diet, I would like the patient exercise routinely  I will be checking hemoglobin A1c and comprehensive metabolic panel  Have counseled patient about the prevention of diabetes, and the risk of progression to type 2 diabetes  Relevant Orders    Comprehensive metabolic panel    Hemoglobin A1C    Hyperlipidemia     Hyperlipidemia controlled patient will discuss with Cardiology if he is a candidate for wrap at but I do not think he is a candidate his LDL cholesterol is only mildly elevated but he is statin intolerant         Relevant Orders    Lipid Panel with Direct LDL reflex    Mass in chest     CT scan was negative for mass patient did see Pulmonary for stable pulmonary nodule the not require any further follow-up  We did review the pulmonary letter together  Screening PSA (prostate specific antigen)     Counseled, check screening PSA         Relevant Orders    PSA, Total Screen    Change in stool     Patient does reports me a change of the color of the stool since starting Prilosec and Gaviscon he is concerned that he is jaundice I will check a comprehensive metabolic panel, I have asked him to stop the gout is gone see if there is improvement in his symptoms  Subjective:      Patient ID: Tiara Elizondo is a 58 y o  male      HPI 58 male follow-up examination prediabetes, benign essential, hyperlipidemia, obesity, mass chest; The patient reports me compliant taking medications without untoward side effects the  The patient is here to review his medical condition, update me on the medical condition and the patient reports me no hospitalizations and no ER visits  He has seen Pulmonary ,Cough winter, the pt reports saw ent -found to have GERD currently on PPI and have done he has noticed a change in the color of his stool to a yellow discoloration a would like to have a bilirubin check he does not have it risk so  The pt reports yellow stool since seeing ent, ? prilosec discolors the stool ;just had colonoscopy nl  The pt has had 6 in the past and got painful locking   The following portions of the patient's history were reviewed and updated as appropriate: allergies, current medications, past family history, past medical history, past social history, past surgical history and problem list     Review of Systems   Constitutional: Negative for activity change, appetite change and unexpected weight change  HENT: Negative for congestion and postnasal drip  Eyes: Negative for visual disturbance  Respiratory: Negative for cough and shortness of breath  Cardiovascular: Negative for chest pain  Gastrointestinal: Negative for abdominal pain, diarrhea, nausea and vomiting  Neurological: Negative for dizziness, light-headedness and headaches  Hematological: Negative for adenopathy  GERD improved    Objective:    No follow-ups on file  No results found  Allergies   Allergen Reactions    Omnipaque [Iohexol] Anaphylaxis    Erythromycin      Other reaction(s):  Other (See Comments)  Pleurisy     Iodine      Other reaction(s): Respiratory Distress    Lisinopril Cough    Statins Myalgia       Past Medical History:   Diagnosis Date    Allergic rhinitis due to pollen     last assessed 10/01/15    Aortic aneurysm (HCC)     4 3 cm  6/2018 last check    CPAP (continuous positive airway pressure) dependence     GERD (gastroesophageal reflux disease)     Hyperlipidemia     Hypertension     Mild intermittent asthma without complication     last assessed 10/01/15    Positive PPD     last assesssed 12/21/15    Sleep apnea     [facility])     Syncopal episodes     1 year ago    Vertigo     last assessed 04/24/17     Past Surgical History:   Procedure Laterality Date    COLONOSCOPY N/A 11/14/2018    Procedure: COLONOSCOPY;  Surgeon: Adriana Dimas MD;  [facility]; Service: Colorectal    EGD AND COLONOSCOPY      HERNIA REPAIR      JOINT REPLACEMENT Left     knee    TOTAL KNEE ARTHROPLASTY  08/29/2016    Dr Ochoa [facility] 07/28/16    URETHRA SURGERY      polyps removed     Current Outpatient Medications on File Prior to Visit   Medication Sig Dispense Refill    albuterol (2 5 mg/3 mL) 0 083 % nebulizer solution Take 1 vial (2 5 mg total) by nebulization every 6 (six) hours as needed for wheezing or shortness of breath 100 vial 1    albuterol (PROAIR HFA) 90 mcg/act inhaler Inhale 2 puffs every 6 (six) hours as needed for wheezing 8 5 g 2    aluminum hydroxide-magnesium carbonate (GAVISCON)  mg/15 mL oral suspension Take 15 mL by mouth daily at bedtime 355 mL 5    amLODIPine (NORVASC) 10 mg tablet Take 5 mg by mouth daily        aspirin 81 MG tablet Take 162 mg by mouth        Cholecalciferol 1000 UNIT/10ML LIQD Take 2,000 Units by mouth        clopidogrel (PLAVIX) 75 mg tablet TAKE 1 TABLET (75 MG TOTAL) BY MOUTH DAILY  11    Coenzyme Q10 (COQ-10) 100 MG CAPS Take by mouth      ezetimibe (ZETIA) 10 mg tablet TAKE 1 TABLET BY MOUTH EVERY DAY 90 tablet 3    fluticasone (FLONASE) 50 mcg/act nasal spray 2 sprays into each nostril daily      furosemide (LASIX) 20 mg tablet TAKE 1 TABLET (20 MG TOTAL) BY MOUTH DAILY   90 tablet 3    KLOR-CON M10 10 MEQ tablet Take 10 mEq by mouth daily  11    metoprolol succinate (TOPROL-XL) 25 mg 24 hr tablet TAKE 1 TABLET (25 MG TOTAL) BY MOUTH DAILY  11    multivitamin (THERAGRAN) TABS Take 1 tablet by mouth      Omega-3 Fatty Acids (FISH OIL PO) Take 2 tablets by mouth daily      omeprazole (PriLOSEC) 20 mg delayed release capsule Take 1 capsule (20 mg total) by mouth 2 (two) times a day before lunch and dinner 60 capsule 5    Probiotic Product (PROBIOTIC-10 PO) Take 2 tablets by mouth daily      ranitidine (ZANTAC) 150 mg tablet Take 150 mg by mouth      valsartan (DIOVAN) 320 MG tablet TAKE 1 TABLET DAILY 30 tablet 5    methylPREDNISolone 4 MG tablet therapy pack Use as directed on package (Patient not taking: Reported on 3/4/2019) 1 each 0     No current facility-administered medications on file prior to visit        Family History   Problem Relation Age of Onset    Coronary artery disease Father     Hypertension Father     Heart disease Father         cardiac disorder     Social History     Socioeconomic History    Marital status: /Civil Union     Spouse name: Not on file    Number of children: 2    Years of education: Not on file    Highest education level: Not on file   Occupational History    Not on file   Social Needs    Financial resource strain: Not on file    Food insecurity:     Worry: Not on file     Inability: Not on file    Transportation needs:     Medical: Not on file     Non-medical: Not on file   Tobacco Use    Smoking status: Never Smoker    Smokeless tobacco: Never Used   Substance and Sexual Activity    Alcohol use: No    Drug use: No    Sexual activity: Not on file   Lifestyle    Physical activity:     Days per week: Not on file     Minutes per session: Not on file    Stress: Not on file   Relationships    Social connections:     Talks on phone: Not on file     Gets together: Not on file     Attends Sikh service: Not on file     Active member of club or organization: Not on file     Attends meetings of clubs or organizations: Not on file Relationship status: Not on file    Intimate partner violence:     Fear of current or ex partner: Not on file     Emotionally abused: Not on file     Physically abused: Not on file     Forced sexual activity: Not on file   Other Topics Concern    Not on file   Social History Narrative    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[employer]:    03/25/19 0758   BP: 142/84   Pulse: 68   Temp: 98 5 °F (36 9 °C)   SpO2: 95%   Weight: 128 kg (282 lb 12 8 oz)   Height: 5' 5" (1 651 m)     Results for orders placed or performed in visit on 01/23/19   INFLUENZA A/B AND RSV, PCR   Result Value Ref Range    INFLU A PCR None Detected None Detected    INFLU B PCR None Detected None Detected    RSV PCR None Detected None Detected   Bordetella pertussis / parapertussis PCR   Result Value Ref Range    Bordetella Pertussis PCR Not Detected Not Detected    BORDETELLA PARAPERTUSSIS PCR Not Detected Not Detected     Weight (last 2 days)     Date/Time   Weight    03/25/19 0758   128 (282 8)            Body mass index is 47 06 kg/m²  BP      Temp      Pulse     Resp      SpO2        Vitals:    03/25/19 0758   Weight: 128 kg (282 lb 12 8 oz)     Vitals:    03/25/19 0758   Weight: 128 kg (282 lb 12 8 oz)       /84   Pulse 68   Temp 98 5 °F (36 9 °C)   Ht 5' 5" (1 651 m)   Wt 128 kg (282 lb 12 8 oz)   SpO2 95%   BMI 47 06 kg/m²          Physical Exam   Constitutional: He appears well-developed and well-nourished  No distress  HENT:   Head: Normocephalic and atraumatic  Right Ear: External ear normal    Left Ear: External ear normal    Mouth/Throat: Oropharynx is clear and moist    Eyes: Pupils are equal, round, and reactive to light  Conjunctivae are normal  Right eye exhibits no discharge  Left eye exhibits no discharge  No scleral icterus  Neck: Neck supple  Cardiovascular: Normal rate, regular rhythm and normal heart sounds  Exam reveals no gallop and no friction rub  No murmur heard    Pulmonary/Chest: No respiratory distress  He has no wheezes  He has no rales  Abdominal: Soft  Bowel sounds are normal  He exhibits no distension and no mass  There is no tenderness  There is no rebound and no guarding  Musculoskeletal: He exhibits no edema or deformity  Lymphadenopathy:     He has no cervical adenopathy  Neurological: He is alert  Skin: He is not diaphoretic  Psychiatric: He has a normal mood and affect

## 2019-03-25 NOTE — ASSESSMENT & PLAN NOTE
CT scan was negative for mass patient did see Pulmonary for stable pulmonary nodule the not require any further follow-up  We did review the pulmonary letter together

## 2019-03-27 ENCOUNTER — TRANSCRIBE ORDERS (OUTPATIENT)
Dept: ADMINISTRATIVE | Age: 64
End: 2019-03-27

## 2019-03-27 ENCOUNTER — APPOINTMENT (OUTPATIENT)
Dept: LAB | Age: 64
End: 2019-03-27
Payer: COMMERCIAL

## 2019-03-27 DIAGNOSIS — E78.5 HYPERLIPIDEMIA, UNSPECIFIED HYPERLIPIDEMIA TYPE: ICD-10-CM

## 2019-03-27 DIAGNOSIS — R73.03 PREDIABETES: ICD-10-CM

## 2019-03-27 LAB
ALBUMIN SERPL BCP-MCNC: 3.8 G/DL (ref 3.5–5)
ALP SERPL-CCNC: 90 U/L (ref 46–116)
ALT SERPL W P-5'-P-CCNC: 89 U/L (ref 12–78)
ANION GAP SERPL CALCULATED.3IONS-SCNC: 2 MMOL/L (ref 4–13)
AST SERPL W P-5'-P-CCNC: 56 U/L (ref 5–45)
BILIRUB SERPL-MCNC: 0.76 MG/DL (ref 0.2–1)
BUN SERPL-MCNC: 9 MG/DL (ref 5–25)
CALCIUM SERPL-MCNC: 8.7 MG/DL (ref 8.3–10.1)
CHLORIDE SERPL-SCNC: 106 MMOL/L (ref 100–108)
CHOLEST SERPL-MCNC: 167 MG/DL (ref 50–200)
CO2 SERPL-SCNC: 28 MMOL/L (ref 21–32)
CREAT SERPL-MCNC: 0.79 MG/DL (ref 0.6–1.3)
EST. AVERAGE GLUCOSE BLD GHB EST-MCNC: 120 MG/DL
GFR SERPL CREATININE-BSD FRML MDRD: 95 ML/MIN/1.73SQ M
GLUCOSE P FAST SERPL-MCNC: 100 MG/DL (ref 65–99)
HBA1C MFR BLD: 5.8 % (ref 4.2–6.3)
HDLC SERPL-MCNC: 38 MG/DL (ref 40–60)
LDLC SERPL CALC-MCNC: 110 MG/DL (ref 0–100)
POTASSIUM SERPL-SCNC: 3.3 MMOL/L (ref 3.5–5.3)
PROT SERPL-MCNC: 7.5 G/DL (ref 6.4–8.2)
SODIUM SERPL-SCNC: 136 MMOL/L (ref 136–145)
TRIGL SERPL-MCNC: 94 MG/DL

## 2019-03-27 PROCEDURE — 80053 COMPREHEN METABOLIC PANEL: CPT

## 2019-03-27 PROCEDURE — 83036 HEMOGLOBIN GLYCOSYLATED A1C: CPT

## 2019-03-27 PROCEDURE — 36415 COLL VENOUS BLD VENIPUNCTURE: CPT

## 2019-03-27 PROCEDURE — 80061 LIPID PANEL: CPT

## 2019-03-28 ENCOUNTER — TELEPHONE (OUTPATIENT)
Dept: INTERNAL MEDICINE CLINIC | Facility: CLINIC | Age: 64
End: 2019-03-28

## 2019-03-28 DIAGNOSIS — R74.8 ABNORMAL LIVER ENZYMES: ICD-10-CM

## 2019-03-28 DIAGNOSIS — R79.9 ABNORMAL SERUM TOTAL PROTEIN LEVEL: ICD-10-CM

## 2019-03-28 DIAGNOSIS — E87.6 LOW SERUM POTASSIUM: Primary | ICD-10-CM

## 2019-05-08 ENCOUNTER — TELEPHONE (OUTPATIENT)
Dept: NEUROLOGY | Facility: CLINIC | Age: 64
End: 2019-05-08

## 2019-05-10 ENCOUNTER — TELEPHONE (OUTPATIENT)
Dept: GASTROENTEROLOGY | Facility: CLINIC | Age: 64
End: 2019-05-10

## 2019-05-10 ENCOUNTER — OFFICE VISIT (OUTPATIENT)
Dept: INTERNAL MEDICINE CLINIC | Facility: CLINIC | Age: 64
End: 2019-05-10
Payer: COMMERCIAL

## 2019-05-10 VITALS
WEIGHT: 268.4 LBS | BODY MASS INDEX: 44.72 KG/M2 | RESPIRATION RATE: 16 BRPM | OXYGEN SATURATION: 97 % | HEART RATE: 55 BPM | HEIGHT: 65 IN | SYSTOLIC BLOOD PRESSURE: 132 MMHG | DIASTOLIC BLOOD PRESSURE: 74 MMHG

## 2019-05-10 DIAGNOSIS — I10 BENIGN ESSENTIAL HYPERTENSION: ICD-10-CM

## 2019-05-10 DIAGNOSIS — R19.5 CHANGE IN STOOL: ICD-10-CM

## 2019-05-10 DIAGNOSIS — R74.8 ELEVATED LIVER ENZYMES: Primary | ICD-10-CM

## 2019-05-10 DIAGNOSIS — E87.6 HYPOKALEMIA: ICD-10-CM

## 2019-05-10 DIAGNOSIS — K21.9 GASTROESOPHAGEAL REFLUX DISEASE WITHOUT ESOPHAGITIS: ICD-10-CM

## 2019-05-10 PROCEDURE — 99214 OFFICE O/P EST MOD 30 MIN: CPT | Performed by: INTERNAL MEDICINE

## 2019-05-10 RX ORDER — POTASSIUM CHLORIDE 750 MG/1
20 TABLET, EXTENDED RELEASE ORAL DAILY
Qty: 60 TABLET | Refills: 11 | Status: SHIPPED | OUTPATIENT
Start: 2019-05-10 | End: 2020-05-22

## 2019-05-11 DIAGNOSIS — I10 ESSENTIAL HYPERTENSION: ICD-10-CM

## 2019-05-13 RX ORDER — VALSARTAN 320 MG/1
TABLET ORAL
Qty: 30 TABLET | Refills: 5 | Status: ON HOLD | OUTPATIENT
Start: 2019-05-13 | End: 2019-11-13 | Stop reason: SDUPTHER

## 2019-05-14 DIAGNOSIS — R74.01 ELEVATED ALT MEASUREMENT: Primary | ICD-10-CM

## 2019-05-14 LAB
ALBUMIN SERPL-MCNC: 4.1 G/DL (ref 3.6–5.1)
ALBUMIN/GLOB SERPL: 1.4 (CALC) (ref 1–2.5)
ALP SERPL-CCNC: 93 U/L (ref 40–115)
ALT SERPL-CCNC: 51 U/L (ref 9–46)
AST SERPL-CCNC: 31 U/L (ref 10–35)
BILIRUB SERPL-MCNC: 0.8 MG/DL (ref 0.2–1.2)
BUN SERPL-MCNC: 8 MG/DL (ref 7–25)
BUN/CREAT SERPL: ABNORMAL (CALC) (ref 6–22)
CALCIUM SERPL-MCNC: 9.2 MG/DL (ref 8.6–10.3)
CHLORIDE SERPL-SCNC: 105 MMOL/L (ref 98–110)
CO2 SERPL-SCNC: 26 MMOL/L (ref 20–32)
CREAT SERPL-MCNC: 0.73 MG/DL (ref 0.7–1.25)
GLOBULIN SER CALC-MCNC: 3 G/DL (CALC) (ref 1.9–3.7)
GLUCOSE SERPL-MCNC: 102 MG/DL (ref 65–99)
POTASSIUM SERPL-SCNC: 3.8 MMOL/L (ref 3.5–5.3)
PROT SERPL-MCNC: 7.1 G/DL (ref 6.1–8.1)
SL AMB EGFR AFRICAN AMERICAN: 114 ML/MIN/1.73M2
SL AMB EGFR NON AFRICAN AMERICAN: 98 ML/MIN/1.73M2
SODIUM SERPL-SCNC: 140 MMOL/L (ref 135–146)

## 2019-05-16 ENCOUNTER — OFFICE VISIT (OUTPATIENT)
Dept: GASTROENTEROLOGY | Facility: MEDICAL CENTER | Age: 64
End: 2019-05-16
Payer: COMMERCIAL

## 2019-05-16 ENCOUNTER — HOSPITAL ENCOUNTER (OUTPATIENT)
Dept: RADIOLOGY | Age: 64
Discharge: HOME | End: 2019-05-16
Payer: COMMERCIAL

## 2019-05-16 VITALS
TEMPERATURE: 97.4 F | SYSTOLIC BLOOD PRESSURE: 126 MMHG | HEART RATE: 55 BPM | WEIGHT: 270 LBS | HEIGHT: 65 IN | BODY MASS INDEX: 44.98 KG/M2 | DIASTOLIC BLOOD PRESSURE: 74 MMHG

## 2019-05-16 DIAGNOSIS — R74.8 ABNORMAL LIVER ENZYMES: ICD-10-CM

## 2019-05-16 DIAGNOSIS — K21.9 GASTROESOPHAGEAL REFLUX DISEASE, ESOPHAGITIS PRESENCE NOT SPECIFIED: Primary | ICD-10-CM

## 2019-05-16 DIAGNOSIS — R74.8 ELEVATED LIVER ENZYMES: ICD-10-CM

## 2019-05-16 PROCEDURE — 76700 US EXAM ABDOM COMPLETE: CPT

## 2019-05-16 PROCEDURE — 99244 OFF/OP CNSLTJ NEW/EST MOD 40: CPT | Performed by: INTERNAL MEDICINE

## 2019-05-17 ENCOUNTER — DOCUMENTATION (OUTPATIENT)
Dept: NEUROLOGY | Facility: CLINIC | Age: 64
End: 2019-05-17

## 2019-06-03 RX ORDER — EVOLOCUMAB 140 MG/ML
140 INJECTION, SOLUTION SUBCUTANEOUS
COMMUNITY
Start: 2019-05-30 | End: 2019-11-25

## 2019-06-04 ENCOUNTER — OFFICE VISIT (OUTPATIENT)
Dept: NEUROLOGY | Facility: CLINIC | Age: 64
End: 2019-06-04
Payer: COMMERCIAL

## 2019-06-04 VITALS
WEIGHT: 272.3 LBS | HEIGHT: 65 IN | DIASTOLIC BLOOD PRESSURE: 72 MMHG | BODY MASS INDEX: 45.37 KG/M2 | SYSTOLIC BLOOD PRESSURE: 122 MMHG | HEART RATE: 61 BPM

## 2019-06-04 DIAGNOSIS — Q63.1 HORSESHOE KIDNEY: ICD-10-CM

## 2019-06-04 DIAGNOSIS — E66.01 MORBID OBESITY (HCC): ICD-10-CM

## 2019-06-04 DIAGNOSIS — I25.10 CORONARY ARTERY DISEASE: ICD-10-CM

## 2019-06-04 DIAGNOSIS — R73.03 PREDIABETES: ICD-10-CM

## 2019-06-04 DIAGNOSIS — E78.5 HYPERLIPIDEMIA, UNSPECIFIED HYPERLIPIDEMIA TYPE: ICD-10-CM

## 2019-06-04 DIAGNOSIS — Z86.73 HISTORY OF TRANSIENT ISCHEMIC ATTACK (TIA): Primary | ICD-10-CM

## 2019-06-04 PROCEDURE — 99214 OFFICE O/P EST MOD 30 MIN: CPT | Performed by: PSYCHIATRY & NEUROLOGY

## 2019-06-05 ENCOUNTER — OFFICE VISIT (OUTPATIENT)
Dept: SLEEP CENTER | Facility: CLINIC | Age: 64
End: 2019-06-05
Payer: COMMERCIAL

## 2019-06-05 VITALS
SYSTOLIC BLOOD PRESSURE: 110 MMHG | DIASTOLIC BLOOD PRESSURE: 68 MMHG | BODY MASS INDEX: 45.32 KG/M2 | HEIGHT: 65 IN | WEIGHT: 272 LBS

## 2019-06-05 DIAGNOSIS — G47.33 OSA (OBSTRUCTIVE SLEEP APNEA): Primary | ICD-10-CM

## 2019-06-05 PROCEDURE — 99214 OFFICE O/P EST MOD 30 MIN: CPT | Performed by: INTERNAL MEDICINE

## 2019-06-07 ENCOUNTER — OFFICE VISIT (OUTPATIENT)
Dept: INTERNAL MEDICINE CLINIC | Facility: CLINIC | Age: 64
End: 2019-06-07
Payer: COMMERCIAL

## 2019-06-07 VITALS
DIASTOLIC BLOOD PRESSURE: 74 MMHG | OXYGEN SATURATION: 96 % | WEIGHT: 269.8 LBS | HEART RATE: 64 BPM | RESPIRATION RATE: 16 BRPM | BODY MASS INDEX: 44.95 KG/M2 | SYSTOLIC BLOOD PRESSURE: 120 MMHG | HEIGHT: 65 IN

## 2019-06-07 DIAGNOSIS — R73.03 PREDIABETES: ICD-10-CM

## 2019-06-07 DIAGNOSIS — I63.81 RIGHT-SIDED LACUNAR INFARCTION (HCC): ICD-10-CM

## 2019-06-07 DIAGNOSIS — K21.9 GASTROESOPHAGEAL REFLUX DISEASE WITHOUT ESOPHAGITIS: Primary | ICD-10-CM

## 2019-06-07 DIAGNOSIS — E66.01 MORBID OBESITY (HCC): ICD-10-CM

## 2019-06-07 DIAGNOSIS — I10 BENIGN ESSENTIAL HYPERTENSION: ICD-10-CM

## 2019-06-07 PROCEDURE — 3078F DIAST BP <80 MM HG: CPT | Performed by: INTERNAL MEDICINE

## 2019-06-07 PROCEDURE — 3074F SYST BP LT 130 MM HG: CPT | Performed by: INTERNAL MEDICINE

## 2019-06-07 PROCEDURE — 99214 OFFICE O/P EST MOD 30 MIN: CPT | Performed by: INTERNAL MEDICINE

## 2019-06-11 ENCOUNTER — TELEPHONE (OUTPATIENT)
Dept: GASTROENTEROLOGY | Facility: MEDICAL CENTER | Age: 64
End: 2019-06-11

## 2019-06-21 ENCOUNTER — APPOINTMENT (OUTPATIENT)
Dept: LAB | Age: 64
End: 2019-06-21
Payer: COMMERCIAL

## 2019-06-21 DIAGNOSIS — R25.2 LEG CRAMP: ICD-10-CM

## 2019-06-21 DIAGNOSIS — R74.8 ABNORMAL LIVER ENZYMES: Primary | ICD-10-CM

## 2019-06-21 LAB
BASOPHILS # BLD AUTO: 0.06 THOUSANDS/ΜL (ref 0–0.1)
BASOPHILS NFR BLD AUTO: 1 % (ref 0–1)
CK SERPL-CCNC: 125 U/L (ref 39–308)
EOSINOPHIL # BLD AUTO: 0.27 THOUSAND/ΜL (ref 0–0.61)
EOSINOPHIL NFR BLD AUTO: 5 % (ref 0–6)
ERYTHROCYTE [DISTWIDTH] IN BLOOD BY AUTOMATED COUNT: 12.8 % (ref 11.6–15.1)
FERRITIN SERPL-MCNC: 86 NG/ML (ref 8–388)
HBV CORE AB SER QL: NORMAL
HBV CORE IGM SER QL: NORMAL
HBV SURFACE AG SER QL: NORMAL
HCT VFR BLD AUTO: 44.9 % (ref 36.5–49.3)
HCV AB SER QL: NORMAL
HGB BLD-MCNC: 15.3 G/DL (ref 12–17)
IMM GRANULOCYTES # BLD AUTO: 0.01 THOUSAND/UL (ref 0–0.2)
IMM GRANULOCYTES NFR BLD AUTO: 0 % (ref 0–2)
IRON SATN MFR SERPL: 34 %
IRON SERPL-MCNC: 111 UG/DL (ref 65–175)
LYMPHOCYTES # BLD AUTO: 2.08 THOUSANDS/ΜL (ref 0.6–4.47)
LYMPHOCYTES NFR BLD AUTO: 37 % (ref 14–44)
MCH RBC QN AUTO: 31.4 PG (ref 26.8–34.3)
MCHC RBC AUTO-ENTMCNC: 34.1 G/DL (ref 31.4–37.4)
MCV RBC AUTO: 92 FL (ref 82–98)
MONOCYTES # BLD AUTO: 0.56 THOUSAND/ΜL (ref 0.17–1.22)
MONOCYTES NFR BLD AUTO: 10 % (ref 4–12)
NEUTROPHILS # BLD AUTO: 2.64 THOUSANDS/ΜL (ref 1.85–7.62)
NEUTS SEG NFR BLD AUTO: 47 % (ref 43–75)
NRBC BLD AUTO-RTO: 0 /100 WBCS
PLATELET # BLD AUTO: 211 THOUSANDS/UL (ref 149–390)
PMV BLD AUTO: 11.1 FL (ref 8.9–12.7)
RBC # BLD AUTO: 4.88 MILLION/UL (ref 3.88–5.62)
TIBC SERPL-MCNC: 326 UG/DL (ref 250–450)
WBC # BLD AUTO: 5.62 THOUSAND/UL (ref 4.31–10.16)

## 2019-06-21 PROCEDURE — 82550 ASSAY OF CK (CPK): CPT

## 2019-06-21 PROCEDURE — 86038 ANTINUCLEAR ANTIBODIES: CPT

## 2019-06-21 PROCEDURE — 86256 FLUORESCENT ANTIBODY TITER: CPT

## 2019-06-21 PROCEDURE — 82728 ASSAY OF FERRITIN: CPT

## 2019-06-21 PROCEDURE — 85025 COMPLETE CBC W/AUTO DIFF WBC: CPT

## 2019-06-21 PROCEDURE — 36415 COLL VENOUS BLD VENIPUNCTURE: CPT

## 2019-06-21 PROCEDURE — 86705 HEP B CORE ANTIBODY IGM: CPT

## 2019-06-21 PROCEDURE — 86704 HEP B CORE ANTIBODY TOTAL: CPT

## 2019-06-21 PROCEDURE — 86235 NUCLEAR ANTIGEN ANTIBODY: CPT

## 2019-06-21 PROCEDURE — 83540 ASSAY OF IRON: CPT

## 2019-06-21 PROCEDURE — 83550 IRON BINDING TEST: CPT

## 2019-06-21 PROCEDURE — 86803 HEPATITIS C AB TEST: CPT

## 2019-06-21 PROCEDURE — 82390 ASSAY OF CERULOPLASMIN: CPT

## 2019-06-21 PROCEDURE — 87340 HEPATITIS B SURFACE AG IA: CPT

## 2019-06-22 LAB
ACTIN IGG SERPL-ACNC: 6 UNITS (ref 0–19)
CERULOPLASMIN SERPL-MCNC: 19.6 MG/DL (ref 16–31)
MITOCHONDRIA M2 IGG SER-ACNC: <20 UNITS (ref 0–20)

## 2019-06-24 ENCOUNTER — TELEPHONE (OUTPATIENT)
Dept: GASTROENTEROLOGY | Facility: AMBULARY SURGERY CENTER | Age: 64
End: 2019-06-24

## 2019-06-24 LAB — RYE IGE QN: NEGATIVE

## 2019-06-26 ENCOUNTER — ANESTHESIA EVENT (OUTPATIENT)
Dept: GASTROENTEROLOGY | Facility: HOSPITAL | Age: 64
End: 2019-06-26

## 2019-06-27 ENCOUNTER — ANESTHESIA (OUTPATIENT)
Dept: GASTROENTEROLOGY | Facility: HOSPITAL | Age: 64
End: 2019-06-27

## 2019-06-27 ENCOUNTER — HOSPITAL ENCOUNTER (OUTPATIENT)
Dept: GASTROENTEROLOGY | Facility: HOSPITAL | Age: 64
Setting detail: OUTPATIENT SURGERY
Discharge: HOME | End: 2019-06-27
Attending: INTERNAL MEDICINE | Admitting: INTERNAL MEDICINE
Payer: COMMERCIAL

## 2019-06-27 VITALS
TEMPERATURE: 98.2 F | WEIGHT: 272 LBS | RESPIRATION RATE: 18 BRPM | HEART RATE: 69 BPM | HEIGHT: 65 IN | DIASTOLIC BLOOD PRESSURE: 69 MMHG | OXYGEN SATURATION: 97 % | SYSTOLIC BLOOD PRESSURE: 123 MMHG | BODY MASS INDEX: 45.32 KG/M2

## 2019-06-27 DIAGNOSIS — K21.9 GASTROESOPHAGEAL REFLUX DISEASE, ESOPHAGITIS PRESENCE NOT SPECIFIED: ICD-10-CM

## 2019-06-27 DIAGNOSIS — R74.8 ABNORMAL LIVER ENZYMES: ICD-10-CM

## 2019-06-27 PROCEDURE — 43239 EGD BIOPSY SINGLE/MULTIPLE: CPT | Performed by: INTERNAL MEDICINE

## 2019-06-27 PROCEDURE — 88305 TISSUE EXAM BY PATHOLOGIST: CPT | Performed by: PATHOLOGY

## 2019-06-27 RX ORDER — SODIUM CHLORIDE 9 MG/ML
125 INJECTION, SOLUTION INTRAVENOUS CONTINUOUS
Status: DISCONTINUED | OUTPATIENT
Start: 2019-06-27 | End: 2019-07-01 | Stop reason: HOSPADM

## 2019-06-27 RX ORDER — KETAMINE HYDROCHLORIDE 50 MG/ML
INJECTION, SOLUTION, CONCENTRATE INTRAMUSCULAR; INTRAVENOUS AS NEEDED
Status: DISCONTINUED | OUTPATIENT
Start: 2019-06-27 | End: 2019-06-27 | Stop reason: SURG

## 2019-06-27 RX ORDER — PROPOFOL 10 MG/ML
INJECTION, EMULSION INTRAVENOUS AS NEEDED
Status: DISCONTINUED | OUTPATIENT
Start: 2019-06-27 | End: 2019-06-27 | Stop reason: SURG

## 2019-06-27 RX ADMIN — PROPOFOL 150 MG: 10 INJECTION, EMULSION INTRAVENOUS at 13:57

## 2019-06-27 RX ADMIN — SODIUM CHLORIDE 125 ML/HR: 0.9 INJECTION, SOLUTION INTRAVENOUS at 13:37

## 2019-06-27 RX ADMIN — PROPOFOL 50 MG: 10 INJECTION, EMULSION INTRAVENOUS at 14:00

## 2019-06-27 RX ADMIN — KETAMINE HYDROCHLORIDE 30 MG: 50 INJECTION INTRAMUSCULAR; INTRAVENOUS at 13:57

## 2019-06-27 NOTE — DISCHARGE INSTRUCTIONS
Gastroesophageal Reflux Disease   WHAT YOU NEED TO KNOW:   Gastroesophageal reflux occurs when acid and food in the stomach back up into the esophagus  Gastroesophageal reflux disease (GERD) is reflux that occurs more than twice a week for a few weeks  It usually causes heartburn and other symptoms  GERD can cause other health problems over time if it is not treated  DISCHARGE INSTRUCTIONS:   Seek care immediately if:   · You feel full and cannot burp or vomit  · You have severe chest pain and sudden trouble breathing  · Your bowel movements are black, bloody, or tarry-looking  · Your vomit looks like coffee grounds or has blood in it  Contact your healthcare provider if:   · You vomit large amounts, or you vomit often  · You have trouble breathing after you vomit  · You have trouble swallowing, or pain with swallowing  · You are losing weight without trying  · Your symptoms get worse or do not improve with treatment  · You have questions or concerns about your condition or care  Medicines:   · Medicines  are used to decrease stomach acid  Medicine may also be used to help your lower esophageal sphincter and stomach contract (tighten) more  · Take your medicine as directed  Contact your healthcare provider if you think your medicine is not helping or if you have side effects  Tell him or her if you are allergic to any medicine  Keep a list of the medicines, vitamins, and herbs you take  Include the amounts, and when and why you take them  Bring the list or the pill bottles to follow-up visits  Carry your medicine list with you in case of an emergency  Manage GERD:   · Do not have foods or drinks that may increase heartburn  These include chocolate, peppermint, fried or fatty foods, drinks that contain caffeine, or carbonated drinks (soda)  Other foods include spicy foods, onions, tomatoes, and tomato-based foods   Do not have foods or drinks that can irritate your esophagus, such as citrus fruits, juices, and alcohol  · Do not eat large meals  When you eat a lot of food at one time, your stomach needs more acid to digest it  Eat 6 small meals each day instead of 3 large ones, and eat slowly  Do not eat meals 2 to 3 hours before bedtime  · Elevate the head of your bed  Place 6-inch blocks under the head of your bed frame  You may also use more than one pillow under your head and shoulders while you sleep  · Maintain a healthy weight  If you are overweight, weight loss may help relieve symptoms of GERD  · Do not smoke  Smoking weakens the lower esophageal sphincter and increases the risk of GERD  Ask your healthcare provider for information if you currently smoke and need help to quit  E-cigarettes or smokeless tobacco still contain nicotine  Talk to your healthcare provider before you use these products  · Do not wear clothing that is tight around your waist   Tight clothing can put pressure on your stomach and cause or worsen GERD symptoms  Follow up with your healthcare provider as directed:  Write down your questions so you remember to ask them during your visits  © 2017 [facility] Information is for End User's use only and may not be sold, redistributed or otherwise used for commercial purposes  All illustrations and images included in CareNotes® are the copyrighted property of [employer]  or Darryl Avilez  The above information is an  only  It is not intended as medical advice for individual conditions or treatments  Talk to your doctor, nurse or pharmacist before following any medical regimen to see if it is safe and effective for you

## 2019-06-27 NOTE — H&P
History and Physical -  [facility]  Leslie Sanders 71 y o  male MRN: [id number]                  HPI: Leslie Sanders is a 71y o  year old male who presents for acid reflux      REVIEW OF SYSTEMS: Per the HPI, and otherwise unremarkable  Historical Information   Past Medical History:   Diagnosis Date    Allergic rhinitis due to pollen     last assessed 10/01/15    Aortic aneurysm (HCC)     4 3 cm  6/2018 last check    CPAP (continuous positive airway pressure) dependence     GERD (gastroesophageal reflux disease)     Hyperlipidemia     Hypertension     Mild intermittent asthma without complication     last assessed 10/01/15    Positive PPD     last assesssed 12/21/15    Sleep apnea     [facility]roke [facility])     Syncopal episodes     1 year ago    Vertigo     last assessed 04/24/17     Past Surgical History:   Procedure Laterality Date    COLONOSCOPY N/A 11/14/2018    Procedure: COLONOSCOPY;  Surgeon: Saritha Coello MD;  [facility]: [facility]; Service: Colorectal    EGD AND COLONOSCOPY      HERNIA REPAIR      JOINT REPLACEMENT Left     knee    TOTAL KNEE ARTHROPLASTY  08/29/2016    Dr Ruff Bowels 07/28/16    URETHRA SURGERY      polyps removed     Social History   Social History     Substance and Sexual Activity   Alcohol Use No     Social History     Substance and Sexual Activity   Drug Use No     Social History     Tobacco Use   Smoking Status Never Smoker   Smokeless Tobacco Never Used     Family History   Problem Relation Age of Onset    Coronary artery disease Father     Hypertension Father     Heart disease Father         cardiac disorder       Meds/Allergies       (Not in a hospital admission)    Allergies   Allergen Reactions    Omnipaque [Iohexol] Anaphylaxis    Erythromycin      Other reaction(s):  Other (See Comments)  Pleurisy     Iodine      Other reaction(s): Respiratory Distress    Lisinopril Cough    Statins Myalgia       Objective     There were no vitals taken for this visit  PHYSICAL EXAM    Gen: NAD  CV: RRR  CHEST: Clear  ABD: soft, NT/ND  EXT: no edema      ASSESSMENT/PLAN:  This is a 71y o  year old male here for acid reflux, and he is stable and optimized for his procedure

## 2019-07-03 ENCOUNTER — TELEPHONE (OUTPATIENT)
Dept: GASTROENTEROLOGY | Facility: CLINIC | Age: 64
End: 2019-07-03

## 2019-08-26 ENCOUNTER — OFFICE VISIT (OUTPATIENT)
Dept: GASTROENTEROLOGY | Facility: MEDICAL CENTER | Age: 64
End: 2019-08-26
Payer: COMMERCIAL

## 2019-08-26 VITALS
TEMPERATURE: 96.1 F | DIASTOLIC BLOOD PRESSURE: 66 MMHG | SYSTOLIC BLOOD PRESSURE: 122 MMHG | HEART RATE: 67 BPM | BODY MASS INDEX: 45.32 KG/M2 | WEIGHT: 272 LBS | HEIGHT: 65 IN

## 2019-08-26 DIAGNOSIS — K22.710 BARRETT'S ESOPHAGUS WITH LOW GRADE DYSPLASIA: Primary | ICD-10-CM

## 2019-08-26 PROCEDURE — 99214 OFFICE O/P EST MOD 30 MIN: CPT | Performed by: INTERNAL MEDICINE

## 2019-08-26 RX ORDER — CALCIUM CARBONATE 500 MG/1
2 TABLET ORAL EVERY 4 HOURS PRN
COMMUNITY

## 2019-08-26 RX ORDER — OMEPRAZOLE 40 MG/1
40 CAPSULE, DELAYED RELEASE ORAL 2 TIMES DAILY
Qty: 60 CAPSULE | Refills: 2 | Status: SHIPPED | OUTPATIENT
Start: 2019-08-26 | End: 2020-01-23

## 2019-08-26 NOTE — PROGRESS NOTES
[facility] - Outpatient Follow-up Note  Nathalie Beauchamp 71 y o  male MRN: [id number]  Encounter: [id number]          ASSESSMENT AND PLAN:      1  Alvares's esophagus with low grade dysplasia  - patient will be started with PPI again  - omeprazole (PriLOSEC) 40 MG capsule; Take 1 capsule (40 mg total) by mouth 2 (two) times a day  Dispense: 60 capsule; Refill: 2  - he was counseled on Alvares's esophagus and low-grade dysplasia and he agrees to start taking PPI again   - I also discussed with the patient regarding the benefits and risks radiofrequency ablation for Alvares's esophagus with low-grade dysplasia and he agrees to proceed to the procedure  - I discussed the plan with Dr Abe Ramirez and he agrees  - Follow up in 3 months after EGD with RFA    ______________________________________________________________________    SUBJECTIVE:      41-year-old man with past medical history of questionable TIA on Plavix, morbidly obese initially referred for evaluation of acid reflux and change of stool color  Patient was seen more than 3 years ago for abnormal liver enzymes  He did not complete the of for workup and lost follow-up then       Patient was seen is more than 3 years ago for abnormal liver enzymes  He recently noticed to have light stool  He had his blood work done showing AST and ALT actually improved  His AST was normal, ALT was 51 and normal bilirubin and alkaline phosphatase  He denies nausea vomiting or abdominal pain  He had an abdominal ultrasound in 2016 showing fatty liver  He recently had episode of cough and went to see of pulmonologist and ENT doctor  After he was prescribed with PPI his symptoms resolved  It was considered to be laryngopharyngeal reflux  He stopped taking PPI and he started taking over-the-counter antacid nightly  He was told he has silent acid reflux      He underwent upper endoscopy with me with findings of 1 cm tongue at GE junction, biopsy confirmed Alvares's esophagus with low-grade dysplasia with 2nd opinion from another pathologist     Patient reported today his reflux symptoms is more overt after his EGD  REVIEW OF SYSTEMS IS OTHERWISE NEGATIVE  Historical Information   Past Medical History:   Diagnosis Date    Allergic rhinitis due to pollen     last assessed 10/01/15    Aortic aneurysm (HCC)     4 3 cm  6/2018 last check    CPAP (continuous positive airway pressure) dependence     GERD (gastroesophageal reflux disease)     Hyperlipidemia     Hypertension     Mild intermittent asthma without complication     last assessed 10/01/15    Positive PPD     last assesssed 12/21/15    Sleep apnea     [facility]roke [facility])     Syncopal episodes     1 year ago    Vertigo     last assessed 04/24/17     Past Surgical History:   Procedure Laterality Date    COLONOSCOPY N/A 11/14/2018    Procedure: COLONOSCOPY;  Surgeon: Briseyda Sanchez MD;  [facility];   Service: Colorectal    EGD AND COLONOSCOPY      HERNIA REPAIR      JOINT REPLACEMENT Left     knee    TOTAL KNEE ARTHROPLASTY  08/29/2016    Dr Hemant Ray 07/28/16    URETHRA SURGERY      polyps removed     Social History   Social History     Substance and Sexual Activity   Alcohol Use No     Social History     Substance and Sexual Activity   Drug Use No     Social History     Tobacco Use   Smoking Status Never Smoker   Smokeless Tobacco Never Used     Family History   Problem Relation Age of Onset    Coronary artery disease Father     Hypertension Father     Heart disease Father         cardiac disorder       Meds/Allergies       Current Outpatient Medications:     albuterol (2 5 mg/3 mL) 0 083 % nebulizer solution    albuterol (PROAIR HFA) 90 mcg/act inhaler    aluminum hydroxide-magnesium carbonate (GAVISCON)  mg/15 mL oral suspension    amLODIPine (NORVASC) 10 mg tablet    aspirin 81 MG tablet    calcium carbonate (TUMS) 500 mg chewable tablet   CHOLECALCIFEROL PO    Coenzyme Q10 (COQ-10) 100 MG CAPS    Evolocumab (REPATHA SURECLICK) 846 MG/ML SOAJ    ezetimibe (ZETIA) 10 mg tablet    fluticasone (FLONASE) 50 mcg/act nasal spray    furosemide (LASIX) 20 mg tablet    KLOR-CON M10 10 MEQ tablet    methylPREDNISolone 4 MG tablet therapy pack    metoprolol succinate (TOPROL-XL) 25 mg 24 hr tablet    multivitamin (THERAGRAN) TABS    Omega-3 Fatty Acids (FISH OIL PO)    Probiotic Product (PROBIOTIC-10) CHEW    ranitidine (ZANTAC) 150 mg tablet    valsartan (DIOVAN) 320 MG tablet    omeprazole (PriLOSEC) 40 MG capsule    Allergies   Allergen Reactions    Omnipaque [Iohexol] Anaphylaxis    Erythromycin      Other reaction(s): Other (See Comments)  Pleurisy     Iodine      Other reaction(s): Respiratory Distress    Lisinopril Cough    Statins Myalgia           Objective     Blood pressure 122/66, pulse 67, temperature (!) 96 1 °F (35 6 °C), temperature source Tympanic, height 5' 5" (1 651 m), weight 123 kg (272 lb)  Body mass index is 45 26 kg/m²  PHYSICAL EXAM:      General Appearance:   Alert, cooperative, no distress   HEENT:   Normocephalic, atraumatic, anicteric      Neck:  Supple, symmetrical, trachea midline   Lungs:   Clear to auscultation bilaterally; no rales, rhonchi or wheezing; respirations unlabored    Heart[de-identified]   Regular rate and rhythm; no murmur, rub, or gallop  Abdomen:   Soft, non-tender, non-distended; normal bowel sounds; no masses, no organomegaly    Genitalia:   Deferred    Rectal:   Deferred    Extremities:  No cyanosis, clubbing or edema    Pulses:  2+ and symmetric    Skin:  No jaundice, rashes, or lesions    Lymph nodes:  No palpable cervical lymphadenopathy        Lab Results:   No visits with results within 1 Day(s) from this visit     Latest known visit with results is:   Hospital Outpatient Visit on 06/27/2019   Component Date Value    Case Report 06/27/2019                      Value:Surgical Pathology Report Case: [id number]                                   Authorizing Provider:  Rashida Solorio MD              Collected:           06/27/2019 1400              Ordering Location:     [facility]        Received:            06/27/2019 1401 [address] Endoscopy                                                          Pathologist:           Estefani Pryor MD                                                   Specimens:   A) - Stomach, antral bx submucosal bx, R/O H  Pylori                                                B) - Stomach, gastric body bx, R/O H  Pylori                                                        C) - Esophagogastric junction, bx, R/O Alvares's                                           Final Diagnosis 06/27/2019                      Value: This result contains rich text formatting which cannot be displayed here   Additional Information 06/27/2019                      Value: This result contains rich text formatting which cannot be displayed here  Liang Gross Description 06/27/2019                      Value: This result contains rich text formatting which cannot be displayed here  Radiology Results:   No results found

## 2019-08-30 ENCOUNTER — TELEPHONE (OUTPATIENT)
Dept: GASTROENTEROLOGY | Facility: CLINIC | Age: 64
End: 2019-08-30

## 2019-08-30 NOTE — TELEPHONE ENCOUNTER
----- Message from Karime Moon MD sent at 8/26/2019  4:12 PM EDT -----  Morena Ramirez, this is the patient I talked to you about RFA for haider's with low grade dysplasia    Yobani Woody, I placed the order for EGD  Can you schedule it with Dr Tami Doss and let the patient know? Thanks  I also placed a wrong order for EUS  Please ignore that, I don't know how to delete the order       Karime Moon

## 2019-08-30 NOTE — TELEPHONE ENCOUNTER
EGD RFA scheduled with Dr Moseley in [place] on 10/21/2019  I gave pt verbal instructions/mailed

## 2019-10-01 ENCOUNTER — TELEPHONE (OUTPATIENT)
Dept: GASTROENTEROLOGY | Facility: CLINIC | Age: 64
End: 2019-10-01

## 2019-10-01 NOTE — TELEPHONE ENCOUNTER
[facility] for prior authorization for EGD w/RFA  Spoke with Aileen Adan who stated this procedure does not require pre-certification  Requested pre-determination  Will fax medical records to [phone] attn: pre-determination  Call ref #[id number]

## 2019-10-03 NOTE — TELEPHONE ENCOUNTER
Called and spoke to [facility] with update on pre-determination for EGD w/RFS  It is still in review

## 2019-10-04 NOTE — TELEPHONE ENCOUNTER
Received a call back from Obdulia Crenshaw at [place] after I left  stating that CPT codes [id number] & 14716 are approved, however, CPT code 37160 is not covered (it's bundled)  Anselmo Carrillo #[phone]

## 2019-10-04 NOTE — TELEPHONE ENCOUNTER
[employer] for clarification, spoke to Natalya Maynard [facility] does not cover [phone] when [id number] and [id number] are performed on the same day  This is because [id number] is considered an EGD code with ablation  Patient's procedure is covered  Reference number for call: [id number]  LMOM for patient letting him know to disregard the letter sent by insurance stating that code 42693 is not covered, as his insurance only needed codes [id number] and [phone], and to call my direct line if the patient would like clarification or has any questions

## 2019-10-04 NOTE — TELEPHONE ENCOUNTER
Patient called back, wanted to know if we received his cardiac records from  and if there were any concerns for his procedure  I let him know that we do have the records, and that he is ok for the procedure

## 2019-10-15 ENCOUNTER — TRANSCRIBE ORDERS (OUTPATIENT)
Dept: GASTROENTEROLOGY | Facility: CLINIC | Age: 64
End: 2019-10-15

## 2019-10-21 ENCOUNTER — ANESTHESIA (OUTPATIENT)
Dept: GASTROENTEROLOGY | Facility: HOSPITAL | Age: 64
End: 2019-10-21

## 2019-10-21 ENCOUNTER — HOSPITAL ENCOUNTER (OUTPATIENT)
Dept: GASTROENTEROLOGY | Facility: HOSPITAL | Age: 64
Setting detail: OUTPATIENT SURGERY
Discharge: HOME | End: 2019-10-21
Attending: INTERNAL MEDICINE | Admitting: INTERNAL MEDICINE
Payer: COMMERCIAL

## 2019-10-21 ENCOUNTER — ANESTHESIA EVENT (OUTPATIENT)
Dept: GASTROENTEROLOGY | Facility: HOSPITAL | Age: 64
End: 2019-10-21

## 2019-10-21 VITALS
BODY MASS INDEX: 46.32 KG/M2 | WEIGHT: 278 LBS | SYSTOLIC BLOOD PRESSURE: 114 MMHG | HEIGHT: 65 IN | TEMPERATURE: 97.7 F | OXYGEN SATURATION: 96 % | RESPIRATION RATE: 16 BRPM | HEART RATE: 64 BPM | DIASTOLIC BLOOD PRESSURE: 66 MMHG

## 2019-10-21 DIAGNOSIS — K22.710 BARRETT'S ESOPHAGUS WITH LOW GRADE DYSPLASIA: ICD-10-CM

## 2019-10-21 PROCEDURE — C1725 CATH, TRANSLUMIN NON-LASER: HCPCS

## 2019-10-21 PROCEDURE — 43270 EGD LESION ABLATION: CPT | Performed by: INTERNAL MEDICINE

## 2019-10-21 RX ORDER — PROPOFOL 10 MG/ML
INJECTION, EMULSION INTRAVENOUS AS NEEDED
Status: DISCONTINUED | OUTPATIENT
Start: 2019-10-21 | End: 2019-10-21 | Stop reason: SURG

## 2019-10-21 RX ORDER — SODIUM CHLORIDE 9 MG/ML
INJECTION, SOLUTION INTRAVENOUS CONTINUOUS PRN
Status: DISCONTINUED | OUTPATIENT
Start: 2019-10-21 | End: 2019-10-21 | Stop reason: SURG

## 2019-10-21 RX ORDER — ACETYLCYSTEINE 200 MG/ML
SOLUTION ORAL; RESPIRATORY (INHALATION) CODE/TRAUMA/SEDATION MEDICATION
Status: COMPLETED | OUTPATIENT
Start: 2019-10-21 | End: 2019-10-21

## 2019-10-21 RX ORDER — SUCRALFATE ORAL 1 G/10ML
1 SUSPENSION ORAL 4 TIMES DAILY
Qty: 420 ML | Refills: 0 | Status: SHIPPED | OUTPATIENT
Start: 2019-10-21 | End: 2019-10-22

## 2019-10-21 RX ORDER — FAMOTIDINE 40 MG/1
40 TABLET, FILM COATED ORAL 2 TIMES DAILY
Qty: 180 TABLET | Refills: 2 | Status: SHIPPED | OUTPATIENT
Start: 2019-10-21 | End: 2020-05-27 | Stop reason: ALTCHOICE

## 2019-10-21 RX ADMIN — PROPOFOL 50 MG: 10 INJECTION, EMULSION INTRAVENOUS at 15:44

## 2019-10-21 RX ADMIN — PROPOFOL 50 MG: 10 INJECTION, EMULSION INTRAVENOUS at 15:47

## 2019-10-21 RX ADMIN — PROPOFOL 50 MG: 10 INJECTION, EMULSION INTRAVENOUS at 15:39

## 2019-10-21 RX ADMIN — PROPOFOL 50 MG: 10 INJECTION, EMULSION INTRAVENOUS at 15:37

## 2019-10-21 RX ADMIN — PROPOFOL 50 MG: 10 INJECTION, EMULSION INTRAVENOUS at 15:34

## 2019-10-21 RX ADMIN — SODIUM CHLORIDE: 0.9 INJECTION, SOLUTION INTRAVENOUS at 15:15

## 2019-10-21 RX ADMIN — PROPOFOL 100 MG: 10 INJECTION, EMULSION INTRAVENOUS at 15:32

## 2019-10-21 RX ADMIN — ACETYLCYSTEINE 200 MG: 200 SOLUTION ORAL; RESPIRATORY (INHALATION) at 15:42

## 2019-10-21 RX ADMIN — PROPOFOL 50 MG: 10 INJECTION, EMULSION INTRAVENOUS at 15:41

## 2019-10-21 RX ADMIN — PROPOFOL 50 MG: 10 INJECTION, EMULSION INTRAVENOUS at 15:50

## 2019-10-21 NOTE — ANESTHESIA PREPROCEDURE EVALUATION
Review of Systems/Medical History  Patient summary reviewed  Chart reviewed      Cardiovascular  Exercise tolerance (METS): >4,  Hyperlipidemia, Hypertension , CAD ,    Pulmonary  Asthma , Sleep apnea ,        GI/Hepatic    GERD , Esophageal disease haider esophagus,        Negative  ROS        Endo/Other     GYN       Hematology  Negative hematology ROS      Musculoskeletal  Negative musculoskeletal ROS        Neurology    CVA ,    Psychology   Negative psychology ROS              Physical Exam    Airway    Mallampati score: II         Dental   No notable dental hx     Cardiovascular  Rhythm: regular, Rate: normal,     Pulmonary  Pulmonary exam normal     Other Findings        Anesthesia Plan  ASA Score- 2     Anesthesia Type- IV sedation with anesthesia with ASA Monitors  Additional Monitors:   Airway Plan:         Plan Factors-Patient not instructed to abstain from smoking on day of procedure  Patient did not smoke on day of surgery  Induction- intravenous  Postoperative Plan-     Informed Consent- Anesthetic plan and risks discussed with patient  I personally reviewed this patient with the CRNA  Discussed and agreed on the Anesthesia Plan with the CRNA  Phan Guerrero

## 2019-10-21 NOTE — ANESTHESIA POSTPROCEDURE EVALUATION
Post-Op Assessment Note    CV Status:  Stable    Pain management: adequate     Mental Status:  Alert and awake   Hydration Status:  Euvolemic   PONV Controlled:  Controlled   Airway Patency:  Patent   Post Op Vitals Reviewed: Yes      Staff: CRNA           /68 (10/21/19 1556)    Temp      Pulse 72 (10/21/19 1556)   Resp 16 (10/21/19 1556)    SpO2 98 % (10/21/19 1556)

## 2019-10-22 ENCOUNTER — TELEPHONE (OUTPATIENT)
Dept: GASTROENTEROLOGY | Facility: CLINIC | Age: 64
End: 2019-10-22

## 2019-10-22 DIAGNOSIS — K22.710 BARRETT'S ESOPHAGUS WITH LOW GRADE DYSPLASIA: Primary | ICD-10-CM

## 2019-10-22 RX ORDER — SUCRALFATE 1 G/1
1 TABLET ORAL 4 TIMES DAILY
Qty: 120 TABLET | Refills: 3 | Status: SHIPPED | OUTPATIENT
Start: 2019-10-22 | End: 2020-01-23

## 2019-10-22 NOTE — TELEPHONE ENCOUNTER
I spoke with pharmacy in clarified the quantity to be dispensed on his Magic mouthwash  They also recommend switching Carafate to pill as the liquid suspension is not covered  New prescription was sent    Hector Ruiz

## 2019-11-13 ENCOUNTER — HOSPITAL ENCOUNTER (INPATIENT)
Facility: HOSPITAL | Age: 64
LOS: 1 days | Discharge: HOME | DRG: 310 | End: 2019-11-14
Attending: EMERGENCY MEDICINE | Admitting: HOSPITALIST
Payer: COMMERCIAL

## 2019-11-13 ENCOUNTER — APPOINTMENT (EMERGENCY)
Dept: RADIOLOGY | Facility: HOSPITAL | Age: 64
DRG: 310 | End: 2019-11-13
Payer: COMMERCIAL

## 2019-11-13 DIAGNOSIS — I48.91 ATRIAL FIBRILLATION WITH RVR (HCC): ICD-10-CM

## 2019-11-13 DIAGNOSIS — I10 ESSENTIAL HYPERTENSION: ICD-10-CM

## 2019-11-13 DIAGNOSIS — K21.9 GASTROESOPHAGEAL REFLUX DISEASE WITHOUT ESOPHAGITIS: Chronic | ICD-10-CM

## 2019-11-13 DIAGNOSIS — R07.9 ACUTE CHEST PAIN: Primary | ICD-10-CM

## 2019-11-13 DIAGNOSIS — I48.91 ATRIAL FIBRILLATION WITH RAPID VENTRICULAR RESPONSE (HCC): ICD-10-CM

## 2019-11-13 PROBLEM — J45.909 ASTHMA: Chronic | Status: ACTIVE | Noted: 2019-11-13

## 2019-11-13 PROBLEM — Z86.73 HISTORY OF CVA (CEREBROVASCULAR ACCIDENT): Chronic | Status: ACTIVE | Noted: 2019-11-13

## 2019-11-13 LAB
ALBUMIN SERPL BCP-MCNC: 3.5 G/DL (ref 3.5–5)
ALP SERPL-CCNC: 109 U/L (ref 46–116)
ALT SERPL W P-5'-P-CCNC: 52 U/L (ref 12–78)
ANION GAP SERPL CALCULATED.3IONS-SCNC: 6 MMOL/L (ref 4–13)
AST SERPL W P-5'-P-CCNC: 28 U/L (ref 5–45)
ATRIAL RATE: 144 BPM
ATRIAL RATE: 156 BPM
ATRIAL RATE: 258 BPM
ATRIAL RATE: 63 BPM
ATRIAL RATE: 86 BPM
BASOPHILS # BLD AUTO: 0.06 THOUSANDS/ΜL (ref 0–0.1)
BASOPHILS NFR BLD AUTO: 1 % (ref 0–1)
BILIRUB SERPL-MCNC: 0.35 MG/DL (ref 0.2–1)
BUN SERPL-MCNC: 10 MG/DL (ref 5–25)
CALCIUM SERPL-MCNC: 9 MG/DL (ref 8.3–10.1)
CHLORIDE SERPL-SCNC: 107 MMOL/L (ref 100–108)
CO2 SERPL-SCNC: 27 MMOL/L (ref 21–32)
CREAT SERPL-MCNC: 0.75 MG/DL (ref 0.6–1.3)
EOSINOPHIL # BLD AUTO: 0.25 THOUSAND/ΜL (ref 0–0.61)
EOSINOPHIL NFR BLD AUTO: 5 % (ref 0–6)
ERYTHROCYTE [DISTWIDTH] IN BLOOD BY AUTOMATED COUNT: 12.9 % (ref 11.6–15.1)
GFR SERPL CREATININE-BSD FRML MDRD: 96 ML/MIN/1.73SQ M
GLUCOSE SERPL-MCNC: 133 MG/DL (ref 65–140)
HCT VFR BLD AUTO: 45.6 % (ref 36.5–49.3)
HGB BLD-MCNC: 15.6 G/DL (ref 12–17)
IMM GRANULOCYTES # BLD AUTO: 0.01 THOUSAND/UL (ref 0–0.2)
IMM GRANULOCYTES NFR BLD AUTO: 0 % (ref 0–2)
LYMPHOCYTES # BLD AUTO: 1.45 THOUSANDS/ΜL (ref 0.6–4.47)
LYMPHOCYTES NFR BLD AUTO: 29 % (ref 14–44)
MAGNESIUM SERPL-MCNC: 2.1 MG/DL (ref 1.6–2.6)
MCH RBC QN AUTO: 31 PG (ref 26.8–34.3)
MCHC RBC AUTO-ENTMCNC: 34.2 G/DL (ref 31.4–37.4)
MCV RBC AUTO: 91 FL (ref 82–98)
MONOCYTES # BLD AUTO: 0.56 THOUSAND/ΜL (ref 0.17–1.22)
MONOCYTES NFR BLD AUTO: 11 % (ref 4–12)
NEUTROPHILS # BLD AUTO: 2.6 THOUSANDS/ΜL (ref 1.85–7.62)
NEUTS SEG NFR BLD AUTO: 54 % (ref 43–75)
NRBC BLD AUTO-RTO: 0 /100 WBCS
NT-PROBNP SERPL-MCNC: 268 PG/ML
P AXIS: 0 DEGREES
P AXIS: 46 DEGREES
P AXIS: 49 DEGREES
PLATELET # BLD AUTO: 233 THOUSANDS/UL (ref 149–390)
PMV BLD AUTO: 10 FL (ref 8.9–12.7)
POTASSIUM SERPL-SCNC: 3.4 MMOL/L (ref 3.5–5.3)
PR INTERVAL: 182 MS
PR INTERVAL: 194 MS
PROT SERPL-MCNC: 7.7 G/DL (ref 6.4–8.2)
QRS AXIS: 40 DEGREES
QRS AXIS: 48 DEGREES
QRS AXIS: 51 DEGREES
QRS AXIS: 68 DEGREES
QRS AXIS: 68 DEGREES
QRSD INTERVAL: 102 MS
QRSD INTERVAL: 108 MS
QRSD INTERVAL: 110 MS
QRSD INTERVAL: 114 MS
QRSD INTERVAL: 98 MS
QT INTERVAL: 286 MS
QT INTERVAL: 304 MS
QT INTERVAL: 338 MS
QT INTERVAL: 380 MS
QT INTERVAL: 412 MS
QTC INTERVAL: 421 MS
QTC INTERVAL: 452 MS
QTC INTERVAL: 454 MS
QTC INTERVAL: 479 MS
QTC INTERVAL: 493 MS
RBC # BLD AUTO: 5.04 MILLION/UL (ref 3.88–5.62)
SODIUM SERPL-SCNC: 140 MMOL/L (ref 136–145)
T WAVE AXIS: 20 DEGREES
T WAVE AXIS: 23 DEGREES
T WAVE AXIS: 23 DEGREES
T WAVE AXIS: 37 DEGREES
T WAVE AXIS: 45 DEGREES
TROPONIN I SERPL-MCNC: <0.02 NG/ML
TSH SERPL DL<=0.05 MIU/L-ACNC: 2.54 UIU/ML (ref 0.36–3.74)
VENTRICULAR RATE: 128 BPM
VENTRICULAR RATE: 133 BPM
VENTRICULAR RATE: 169 BPM
VENTRICULAR RATE: 63 BPM
VENTRICULAR RATE: 86 BPM
WBC # BLD AUTO: 4.93 THOUSAND/UL (ref 4.31–10.16)

## 2019-11-13 PROCEDURE — 93010 ELECTROCARDIOGRAM REPORT: CPT | Performed by: INTERNAL MEDICINE

## 2019-11-13 PROCEDURE — 96365 THER/PROPH/DIAG IV INF INIT: CPT

## 2019-11-13 PROCEDURE — 85025 COMPLETE CBC W/AUTO DIFF WBC: CPT | Performed by: EMERGENCY MEDICINE

## 2019-11-13 PROCEDURE — 84443 ASSAY THYROID STIM HORMONE: CPT | Performed by: EMERGENCY MEDICINE

## 2019-11-13 PROCEDURE — 80053 COMPREHEN METABOLIC PANEL: CPT | Performed by: EMERGENCY MEDICINE

## 2019-11-13 PROCEDURE — 99285 EMERGENCY DEPT VISIT HI MDM: CPT | Performed by: EMERGENCY MEDICINE

## 2019-11-13 PROCEDURE — 93005 ELECTROCARDIOGRAM TRACING: CPT

## 2019-11-13 PROCEDURE — 36415 COLL VENOUS BLD VENIPUNCTURE: CPT | Performed by: EMERGENCY MEDICINE

## 2019-11-13 PROCEDURE — 99214 OFFICE O/P EST MOD 30 MIN: CPT | Performed by: INTERNAL MEDICINE

## 2019-11-13 PROCEDURE — 94760 N-INVAS EAR/PLS OXIMETRY 1: CPT

## 2019-11-13 PROCEDURE — 71046 X-RAY EXAM CHEST 2 VIEWS: CPT

## 2019-11-13 PROCEDURE — 83735 ASSAY OF MAGNESIUM: CPT | Performed by: INTERNAL MEDICINE

## 2019-11-13 PROCEDURE — 99220 PR INITIAL OBSERVATION CARE/DAY 70 MINUTES: CPT | Performed by: INTERNAL MEDICINE

## 2019-11-13 PROCEDURE — 84484 ASSAY OF TROPONIN QUANT: CPT | Performed by: EMERGENCY MEDICINE

## 2019-11-13 PROCEDURE — 96376 TX/PRO/DX INJ SAME DRUG ADON: CPT

## 2019-11-13 PROCEDURE — 83880 ASSAY OF NATRIURETIC PEPTIDE: CPT | Performed by: EMERGENCY MEDICINE

## 2019-11-13 PROCEDURE — 99285 EMERGENCY DEPT VISIT HI MDM: CPT

## 2019-11-13 RX ORDER — ASPIRIN 81 MG/1
81 TABLET, CHEWABLE ORAL DAILY
Status: DISCONTINUED | OUTPATIENT
Start: 2019-11-14 | End: 2019-11-14 | Stop reason: HOSPADM

## 2019-11-13 RX ORDER — ASPIRIN 81 MG/1
162 TABLET, CHEWABLE ORAL ONCE
Status: DISCONTINUED | OUTPATIENT
Start: 2019-11-13 | End: 2019-11-13

## 2019-11-13 RX ORDER — PROPAFENONE HYDROCHLORIDE 225 MG/1
225 CAPSULE, EXTENDED RELEASE ORAL EVERY 12 HOURS SCHEDULED
Status: DISCONTINUED | OUTPATIENT
Start: 2019-11-13 | End: 2019-11-14 | Stop reason: HOSPADM

## 2019-11-13 RX ORDER — METOPROLOL SUCCINATE 25 MG/1
25 TABLET, EXTENDED RELEASE ORAL DAILY
Status: DISCONTINUED | OUTPATIENT
Start: 2019-11-13 | End: 2019-11-13

## 2019-11-13 RX ORDER — FUROSEMIDE 20 MG/1
20 TABLET ORAL DAILY
Status: DISCONTINUED | OUTPATIENT
Start: 2019-11-13 | End: 2019-11-14 | Stop reason: HOSPADM

## 2019-11-13 RX ORDER — ACETAMINOPHEN 325 MG/1
650 TABLET ORAL EVERY 6 HOURS PRN
Status: DISCONTINUED | OUTPATIENT
Start: 2019-11-13 | End: 2019-11-14 | Stop reason: HOSPADM

## 2019-11-13 RX ORDER — MELATONIN
2000 DAILY
Status: DISCONTINUED | OUTPATIENT
Start: 2019-11-13 | End: 2019-11-14 | Stop reason: HOSPADM

## 2019-11-13 RX ORDER — FAMOTIDINE 20 MG/1
40 TABLET, FILM COATED ORAL EVERY 12 HOURS SCHEDULED
Status: DISCONTINUED | OUTPATIENT
Start: 2019-11-13 | End: 2019-11-14 | Stop reason: HOSPADM

## 2019-11-13 RX ORDER — VALSARTAN 320 MG/1
TABLET ORAL
Qty: 90 TABLET | Refills: 1 | Status: SHIPPED | OUTPATIENT
Start: 2019-11-13 | End: 2020-09-04 | Stop reason: SDUPTHER

## 2019-11-13 RX ORDER — POTASSIUM CHLORIDE 20 MEQ/1
20 TABLET, EXTENDED RELEASE ORAL ONCE
Status: COMPLETED | OUTPATIENT
Start: 2019-11-13 | End: 2019-11-13

## 2019-11-13 RX ORDER — ASPIRIN 81 MG/1
162 TABLET, CHEWABLE ORAL DAILY
Status: DISCONTINUED | OUTPATIENT
Start: 2019-11-13 | End: 2019-11-13

## 2019-11-13 RX ORDER — CHOLECALCIFEROL (VITAMIN D3) 125 MCG
100 CAPSULE ORAL DAILY
Status: DISCONTINUED | OUTPATIENT
Start: 2019-11-13 | End: 2019-11-14 | Stop reason: HOSPADM

## 2019-11-13 RX ORDER — LOSARTAN POTASSIUM 50 MG/1
100 TABLET ORAL DAILY
Status: DISCONTINUED | OUTPATIENT
Start: 2019-11-13 | End: 2019-11-14 | Stop reason: HOSPADM

## 2019-11-13 RX ORDER — EZETIMIBE 10 MG/1
10 TABLET ORAL DAILY
Status: DISCONTINUED | OUTPATIENT
Start: 2019-11-13 | End: 2019-11-14 | Stop reason: HOSPADM

## 2019-11-13 RX ORDER — METOPROLOL SUCCINATE 25 MG/1
12.5 TABLET, EXTENDED RELEASE ORAL DAILY
Status: DISCONTINUED | OUTPATIENT
Start: 2019-11-13 | End: 2019-11-14 | Stop reason: HOSPADM

## 2019-11-13 RX ORDER — CHLORAL HYDRATE 500 MG
1000 CAPSULE ORAL DAILY
Status: DISCONTINUED | OUTPATIENT
Start: 2019-11-13 | End: 2019-11-14 | Stop reason: HOSPADM

## 2019-11-13 RX ORDER — ASPIRIN 81 MG/1
324 TABLET, CHEWABLE ORAL ONCE
Status: DISCONTINUED | OUTPATIENT
Start: 2019-11-13 | End: 2019-11-13

## 2019-11-13 RX ORDER — PANTOPRAZOLE SODIUM 40 MG/1
40 TABLET, DELAYED RELEASE ORAL DAILY
Status: DISCONTINUED | OUTPATIENT
Start: 2019-11-13 | End: 2019-11-14 | Stop reason: HOSPADM

## 2019-11-13 RX ORDER — ALBUTEROL SULFATE 90 UG/1
2 AEROSOL, METERED RESPIRATORY (INHALATION) EVERY 6 HOURS PRN
Status: DISCONTINUED | OUTPATIENT
Start: 2019-11-13 | End: 2019-11-14 | Stop reason: HOSPADM

## 2019-11-13 RX ORDER — POTASSIUM CHLORIDE 20 MEQ/1
20 TABLET, EXTENDED RELEASE ORAL DAILY
Status: DISCONTINUED | OUTPATIENT
Start: 2019-11-14 | End: 2019-11-14 | Stop reason: HOSPADM

## 2019-11-13 RX ORDER — DILTIAZEM HYDROCHLORIDE 5 MG/ML
20 INJECTION INTRAVENOUS ONCE
Status: COMPLETED | OUTPATIENT
Start: 2019-11-13 | End: 2019-11-13

## 2019-11-13 RX ADMIN — EZETIMIBE 10 MG: 10 TABLET ORAL at 16:39

## 2019-11-13 RX ADMIN — Medication 100 MG: at 16:39

## 2019-11-13 RX ADMIN — FAMOTIDINE 40 MG: 20 TABLET ORAL at 21:10

## 2019-11-13 RX ADMIN — DILTIAZEM HYDROCHLORIDE 20 MG: 5 INJECTION INTRAVENOUS at 09:41

## 2019-11-13 RX ADMIN — METOPROLOL SUCCINATE 12.5 MG: 25 TABLET, EXTENDED RELEASE ORAL at 16:39

## 2019-11-13 RX ADMIN — Medication 1000 MG: at 16:39

## 2019-11-13 RX ADMIN — PANTOPRAZOLE SODIUM 40 MG: 40 TABLET, DELAYED RELEASE ORAL at 16:39

## 2019-11-13 RX ADMIN — MELATONIN 2000 UNITS: at 16:40

## 2019-11-13 RX ADMIN — POTASSIUM CHLORIDE 20 MEQ: 1500 TABLET, EXTENDED RELEASE ORAL at 14:56

## 2019-11-13 RX ADMIN — LOSARTAN POTASSIUM 100 MG: 50 TABLET, FILM COATED ORAL at 16:39

## 2019-11-13 RX ADMIN — PROPAFENONE HYDROCHLORIDE 225 MG: 225 CAPSULE, EXTENDED RELEASE ORAL at 21:10

## 2019-11-13 RX ADMIN — DILTIAZEM HYDROCHLORIDE 7.5 MG/HR: 5 INJECTION INTRAVENOUS at 09:45

## 2019-11-13 RX ADMIN — FUROSEMIDE 20 MG: 20 TABLET ORAL at 16:40

## 2019-11-13 RX ADMIN — RIVAROXABAN 20 MG: 20 TABLET, FILM COATED ORAL at 16:40

## 2019-11-14 ENCOUNTER — APPOINTMENT (OUTPATIENT)
Dept: NON INVASIVE DIAGNOSTICS | Facility: HOSPITAL | Age: 64
DRG: 310 | End: 2019-11-14
Payer: COMMERCIAL

## 2019-11-14 VITALS
TEMPERATURE: 97.7 F | HEIGHT: 65 IN | HEART RATE: 65 BPM | SYSTOLIC BLOOD PRESSURE: 123 MMHG | WEIGHT: 275.8 LBS | DIASTOLIC BLOOD PRESSURE: 62 MMHG | BODY MASS INDEX: 45.95 KG/M2 | RESPIRATION RATE: 18 BRPM | OXYGEN SATURATION: 96 %

## 2019-11-14 LAB
ANION GAP SERPL CALCULATED.3IONS-SCNC: 6 MMOL/L (ref 4–13)
BASOPHILS # BLD AUTO: 0.07 THOUSANDS/ΜL (ref 0–0.1)
BASOPHILS NFR BLD AUTO: 1 % (ref 0–1)
BUN SERPL-MCNC: 12 MG/DL (ref 5–25)
CALCIUM SERPL-MCNC: 9.2 MG/DL (ref 8.3–10.1)
CHLORIDE SERPL-SCNC: 108 MMOL/L (ref 100–108)
CO2 SERPL-SCNC: 26 MMOL/L (ref 21–32)
CREAT SERPL-MCNC: 0.75 MG/DL (ref 0.6–1.3)
EOSINOPHIL # BLD AUTO: 0.3 THOUSAND/ΜL (ref 0–0.61)
EOSINOPHIL NFR BLD AUTO: 6 % (ref 0–6)
ERYTHROCYTE [DISTWIDTH] IN BLOOD BY AUTOMATED COUNT: 13 % (ref 11.6–15.1)
GFR SERPL CREATININE-BSD FRML MDRD: 96 ML/MIN/1.73SQ M
GLUCOSE P FAST SERPL-MCNC: 107 MG/DL (ref 65–99)
GLUCOSE SERPL-MCNC: 107 MG/DL (ref 65–140)
HCT VFR BLD AUTO: 43.8 % (ref 36.5–49.3)
HGB BLD-MCNC: 14.6 G/DL (ref 12–17)
IMM GRANULOCYTES # BLD AUTO: 0.01 THOUSAND/UL (ref 0–0.2)
IMM GRANULOCYTES NFR BLD AUTO: 0 % (ref 0–2)
LYMPHOCYTES # BLD AUTO: 1.94 THOUSANDS/ΜL (ref 0.6–4.47)
LYMPHOCYTES NFR BLD AUTO: 36 % (ref 14–44)
MAGNESIUM SERPL-MCNC: 2.1 MG/DL (ref 1.6–2.6)
MCH RBC QN AUTO: 30.5 PG (ref 26.8–34.3)
MCHC RBC AUTO-ENTMCNC: 33.3 G/DL (ref 31.4–37.4)
MCV RBC AUTO: 91 FL (ref 82–98)
MONOCYTES # BLD AUTO: 0.61 THOUSAND/ΜL (ref 0.17–1.22)
MONOCYTES NFR BLD AUTO: 11 % (ref 4–12)
NEUTROPHILS # BLD AUTO: 2.43 THOUSANDS/ΜL (ref 1.85–7.62)
NEUTS SEG NFR BLD AUTO: 46 % (ref 43–75)
NRBC BLD AUTO-RTO: 0 /100 WBCS
PLATELET # BLD AUTO: 222 THOUSANDS/UL (ref 149–390)
PMV BLD AUTO: 10.9 FL (ref 8.9–12.7)
POTASSIUM SERPL-SCNC: 3.5 MMOL/L (ref 3.5–5.3)
RBC # BLD AUTO: 4.79 MILLION/UL (ref 3.88–5.62)
SODIUM SERPL-SCNC: 140 MMOL/L (ref 136–145)
WBC # BLD AUTO: 5.36 THOUSAND/UL (ref 4.31–10.16)

## 2019-11-14 PROCEDURE — 80048 BASIC METABOLIC PNL TOTAL CA: CPT | Performed by: INTERNAL MEDICINE

## 2019-11-14 PROCEDURE — 99239 HOSP IP/OBS DSCHRG MGMT >30: CPT | Performed by: HOSPITALIST

## 2019-11-14 PROCEDURE — 99232 SBSQ HOSP IP/OBS MODERATE 35: CPT | Performed by: INTERNAL MEDICINE

## 2019-11-14 PROCEDURE — 83735 ASSAY OF MAGNESIUM: CPT | Performed by: INTERNAL MEDICINE

## 2019-11-14 PROCEDURE — 85025 COMPLETE CBC W/AUTO DIFF WBC: CPT | Performed by: INTERNAL MEDICINE

## 2019-11-14 PROCEDURE — NC001 PR NO CHARGE: Performed by: INTERNAL MEDICINE

## 2019-11-14 PROCEDURE — 93306 TTE W/DOPPLER COMPLETE: CPT

## 2019-11-14 PROCEDURE — 93306 TTE W/DOPPLER COMPLETE: CPT | Performed by: INTERNAL MEDICINE

## 2019-11-14 RX ORDER — PROPAFENONE HYDROCHLORIDE 225 MG/1
225 CAPSULE, EXTENDED RELEASE ORAL EVERY 12 HOURS SCHEDULED
Qty: 60 CAPSULE | Refills: 0 | Status: SHIPPED | OUTPATIENT
Start: 2019-11-14 | End: 2019-11-25 | Stop reason: SINTOL

## 2019-11-14 RX ORDER — METOPROLOL SUCCINATE 25 MG/1
12.5 TABLET, EXTENDED RELEASE ORAL DAILY
Refills: 0
Start: 2019-11-14 | End: 2020-01-23

## 2019-11-14 RX ORDER — MAGNESIUM HYDROXIDE/ALUMINUM HYDROXICE/SIMETHICONE 120; 1200; 1200 MG/30ML; MG/30ML; MG/30ML
30 SUSPENSION ORAL EVERY 4 HOURS PRN
Qty: 355 ML | Refills: 0 | Status: SHIPPED | OUTPATIENT
Start: 2019-11-14 | End: 2021-05-28

## 2019-11-14 RX ORDER — MAGNESIUM HYDROXIDE/ALUMINUM HYDROXICE/SIMETHICONE 120; 1200; 1200 MG/30ML; MG/30ML; MG/30ML
30 SUSPENSION ORAL EVERY 4 HOURS PRN
Status: DISCONTINUED | OUTPATIENT
Start: 2019-11-14 | End: 2019-11-14 | Stop reason: HOSPADM

## 2019-11-14 RX ADMIN — POTASSIUM CHLORIDE 20 MEQ: 1500 TABLET, EXTENDED RELEASE ORAL at 17:38

## 2019-11-14 RX ADMIN — METOPROLOL SUCCINATE 12.5 MG: 25 TABLET, EXTENDED RELEASE ORAL at 17:38

## 2019-11-14 RX ADMIN — FUROSEMIDE 20 MG: 20 TABLET ORAL at 17:38

## 2019-11-14 RX ADMIN — RIVAROXABAN 20 MG: 20 TABLET, FILM COATED ORAL at 17:38

## 2019-11-14 RX ADMIN — ASPIRIN 81 MG 81 MG: 81 TABLET ORAL at 08:49

## 2019-11-14 RX ADMIN — Medication 100 MG: at 17:38

## 2019-11-14 RX ADMIN — ALUMINUM HYDROXIDE, MAGNESIUM HYDROXIDE, AND SIMETHICONE 30 ML: 200; 200; 20 SUSPENSION ORAL at 14:17

## 2019-11-14 RX ADMIN — EZETIMIBE 10 MG: 10 TABLET ORAL at 17:39

## 2019-11-14 RX ADMIN — LOSARTAN POTASSIUM 100 MG: 50 TABLET, FILM COATED ORAL at 17:38

## 2019-11-14 RX ADMIN — FAMOTIDINE 40 MG: 20 TABLET ORAL at 08:48

## 2019-11-14 RX ADMIN — PANTOPRAZOLE SODIUM 40 MG: 40 TABLET, DELAYED RELEASE ORAL at 17:38

## 2019-11-14 RX ADMIN — MELATONIN 2000 UNITS: at 17:38

## 2019-11-14 RX ADMIN — Medication 1000 MG: at 17:38

## 2019-11-15 ENCOUNTER — TRANSITIONAL CARE MANAGEMENT (OUTPATIENT)
Dept: INTERNAL MEDICINE CLINIC | Facility: CLINIC | Age: 64
End: 2019-11-15

## 2019-11-21 ENCOUNTER — TELEPHONE (OUTPATIENT)
Dept: GASTROENTEROLOGY | Facility: CLINIC | Age: 64
End: 2019-11-21

## 2019-11-21 NOTE — TELEPHONE ENCOUNTER
----- Message from Judy Hinojosa MD sent at 10/21/2019  4:12 PM EDT -----  Please schedule for repeat endoscopy and EUS in 3 months    Thank you

## 2019-11-21 NOTE — TELEPHONE ENCOUNTER
EGD/EUS scheduled with Dr Moseley in [facility] on 1/15/20  I gave patient verbal instructions/mailed  Medication Clearance faxed to [facility]

## 2019-11-25 ENCOUNTER — CLINICAL SUPPORT (OUTPATIENT)
Dept: INTERNAL MEDICINE CLINIC | Facility: CLINIC | Age: 64
End: 2019-11-25
Payer: COMMERCIAL

## 2019-11-25 ENCOUNTER — OFFICE VISIT (OUTPATIENT)
Dept: INTERNAL MEDICINE CLINIC | Facility: CLINIC | Age: 64
End: 2019-11-25
Payer: COMMERCIAL

## 2019-11-25 VITALS
RESPIRATION RATE: 16 BRPM | HEART RATE: 62 BPM | DIASTOLIC BLOOD PRESSURE: 84 MMHG | WEIGHT: 276.5 LBS | TEMPERATURE: 98.1 F | BODY MASS INDEX: 46.07 KG/M2 | HEIGHT: 65 IN | SYSTOLIC BLOOD PRESSURE: 122 MMHG | OXYGEN SATURATION: 92 %

## 2019-11-25 DIAGNOSIS — I48.91 ATRIAL FIBRILLATION WITH RVR (HCC): ICD-10-CM

## 2019-11-25 DIAGNOSIS — I87.2 VENOUS INSUFFICIENCY OF BOTH LOWER EXTREMITIES: ICD-10-CM

## 2019-11-25 DIAGNOSIS — Z79.899 POLYPHARMACY: Primary | ICD-10-CM

## 2019-11-25 DIAGNOSIS — E78.5 HYPERLIPIDEMIA, UNSPECIFIED HYPERLIPIDEMIA TYPE: ICD-10-CM

## 2019-11-25 DIAGNOSIS — I48.92 ATRIAL FLUTTER, UNSPECIFIED TYPE (HCC): Primary | ICD-10-CM

## 2019-11-25 PROCEDURE — 1111F DSCHRG MED/CURRENT MED MERGE: CPT | Performed by: INTERNAL MEDICINE

## 2019-11-25 PROCEDURE — 99495 TRANSJ CARE MGMT MOD F2F 14D: CPT | Performed by: INTERNAL MEDICINE

## 2019-11-25 RX ORDER — PREDNISONE 50 MG/1
TABLET ORAL
COMMUNITY
Start: 2019-11-21 | End: 2019-11-25 | Stop reason: SDUPTHER

## 2019-11-25 RX ORDER — PREDNISONE 50 MG/1
TABLET ORAL
COMMUNITY
Start: 2019-11-21 | End: 2020-01-23

## 2019-11-25 NOTE — LETTER
November 25, 2019     Patient: Yael Skaggs   YOB: 1955   Date of Visit: 11/25/2019       To Whom it May Concern:    Yael Skaggs is under my professional care  He was seen in my office on 11/25/2019  He may return to work on 12/12/19  If you have any questions or concerns, please don't hesitate to call           Sincerely,          [facility],         CC: No Recipients

## 2019-11-25 NOTE — PATIENT INSTRUCTIONS
Consider trial off of fish oil     May take furosemide (Lasix) earlier in the day to reduce night time urination    If interested, may take once daily medications in the morning instead of at night    Take Xarelto with food

## 2019-11-25 NOTE — PROGRESS NOTES
Assessment/Plan:     Atrial flutter [facility])  Transition care management visit today we did review the discharge summary medication list consultations  Currently he is clinically stable doing well he will be seeing Cardiology and EP specialist for consideration of ablation  Of note the patient does have a Alvares's esophagus with mild dysplasia it will be undergoing upper endoscopy via GI in the near future  He is currently anticoagulated and rate controlled  I will check basic metabolic panel, magnesium and 3rd generation TSH  RTO 3 months call if any problems        Diagnoses and all orders for this visit:    Atrial flutter, unspecified type ([address] )  -     Basic metabolic panel; Future  -     Magnesium; Future  -     TSH, 3rd generation; Future    Other orders  -     Cancel: PNEUMOCOCCAL CONJUGATE VACCINE 13-VALENT GREATER THAN 6 MONTHS  -     Cancel: influenza vaccine, [id number], high-dose, PF 0 5 mL (FLUZONE HIGH-DOSE)  -     MAGIC MOUTHWASH - COMPOUNDING; SWISH AND SPIT 10 ML EVERY 4 (FOUR) HOURS AS NEEDED FOR MOUTH PAIN OR DISCOMFORT         Subjective:     Patient ID: Mara Rubio is a 71 y o  male  HPI 26-year-old male transition care management visit present to the hospital 11/13/2019 due to retrosternal pressure radiating across his chest which began this morning when he was going to work  Initially thought the symptoms related to GERD he took 3 times a drink milk but no improvement   He checked his heart rate on his watch and heart rate was 160-200  He called EMS has brought to ER and admitted for atrial fibrillation/flutter  Dated patient is here for review of the transition care management visit, discharge summary, medicines, consultations and hospitalization reviewed with the patient in detail    was driving on the bus , having pressure on the chest area , had just had esop operation , toook 2 tums and then carnation breakfast, the pt check his apple watch, hr200 called 911, went to the er got cardizem and got better pt is seeing card and will be seeing ep study,  Th ept is going for ct coronary    Review of Systems   Constitutional: Negative for activity change, appetite change and unexpected weight change  HENT: Negative for congestion and postnasal drip  Eyes: Negative for visual disturbance  Respiratory: Negative for cough and shortness of breath  Cardiovascular: Negative for chest pain  Gastrointestinal: Negative for abdominal pain, diarrhea, nausea and vomiting  Neurological: Negative for dizziness, light-headedness and headaches  Hematological: Negative for adenopathy  Objective:                  No follow-ups on file  Allergies   Allergen Reactions    Iodine Shortness Of Breath     Other reaction(s): Respiratory Distress  Other reaction(s): Respiratory Distress    Omnipaque [Iohexol] Anaphylaxis    Erythromycin Hives     Other reaction(s): Other (See Comments)  Pleurisy   Pleurisy   Other reaction(s): Other (See Comments)  Pleurisy     Lisinopril Cough    Statins Myalgia       Past Medical History:   Diagnosis Date    Allergic rhinitis due to pollen     last assessed 10/01/15    Aortic aneurysm (HCC)     4 3 cm  6/2018 last check    Alvares esophagus     CPAP (continuous positive airway pressure) dependence     GERD (gastroesophageal reflux disease)     Hyperlipidemia     Hypertension     Mild intermittent asthma without complication     last assessed 10/01/15    Positive PPD     last assesssed 12/21/15    Sleep apnea     Stroke [facility])     unsure if/when happened; no weakness    Syncopal episodes     1 year ago    Vertigo     last assessed 04/24/17     Past Surgical History:   Procedure Laterality Date    COLONOSCOPY N/A 11/14/2018    Procedure: COLONOSCOPY;  Surgeon: Peggy Meeks MD;  [facility]: [facility];   Service: Colorectal    EGD AND COLONOSCOPY      HERNIA REPAIR      JOINT REPLACEMENT Left     knee    TOTAL KNEE ARTHROPLASTY  08/29/2016 Dr Yoni Colón 07/28/16    URETHRA SURGERY      polyps removed     Current Outpatient Medications on File Prior to Visit   Medication Sig Dispense Refill    aluminum-magnesium hydroxide-simethicone (MYLANTA) 200-200-20 mg/5 mL suspension Take 30 mL by mouth every 4 (four) hours as needed for indigestion or heartburn 355 mL 0    aspirin 81 MG tablet Take 81 mg by mouth       calcium carbonate (TUMS) 500 mg chewable tablet Chew 2 tablets every 4 (four) hours as needed       CHOLECALCIFEROL PO Take 2,000 Units by mouth Chew form      Coenzyme Q10 (COQ-10) 100 MG CAPS Take 100 mg by mouth daily       ezetimibe (ZETIA) 10 mg tablet TAKE 1 TABLET BY MOUTH EVERY DAY 90 tablet 3    famotidine (PEPCID) 40 MG tablet Take 1 tablet (40 mg total) by mouth 2 (two) times a day 180 tablet 2    fluticasone (FLONASE) 50 mcg/act nasal spray 2 sprays into each nostril daily as needed       furosemide (LASIX) 20 mg tablet TAKE 1 TABLET (20 MG TOTAL) BY MOUTH DAILY   90 tablet 3    KLOR-CON M10 10 MEQ tablet Take 2 tablets (20 mEq total) by mouth daily 60 tablet 11    metoprolol succinate (TOPROL-XL) 25 mg 24 hr tablet Take 0 5 tablets (12 5 mg total) by mouth daily (Patient taking differently: Take 25 mg by mouth daily )  0    multivitamin (THERAGRAN) TABS Take 1 tablet by mouth Gummy form      Omega-3 Fatty Acids (FISH OIL PO) Take 1 capsule by mouth daily Gummy form      omeprazole (PriLOSEC) 40 MG capsule Take 1 capsule (40 mg total) by mouth 2 (two) times a day 60 capsule 2    Probiotic Product (PROBIOTIC-10) CHEW Chew 2 each daily       rivaroxaban (XARELTO) 20 mg tablet Take 1 tablet (20 mg total) by mouth daily with dinner  0    sucralfate (CARAFATE) 1 g tablet Take 1 tablet (1 g total) by mouth 4 (four) times a day 120 tablet 3    valsartan (DIOVAN) 320 MG tablet TAKE 1 TABLET DAILY 90 tablet 1    MAGIC MOUTHWASH - COMPOUNDING SWISH AND SPIT 10 ML EVERY 4 (FOUR) HOURS AS NEEDED FOR MOUTH PAIN OR DISCOMFORT  0  [DISCONTINUED] dwight Torres Binder oxide-diphenhydramine-lidocaine viscous (MAGIC MOUTHWASH) 1:1:1 suspension Swish and spit 10 mL every 4 (four) hours as needed for mouth pain or discomfort (Patient not taking: Reported on 11/13/2019) 1 Bottle 0    [DISCONTINUED] albuterol (2 5 mg/3 mL) 0 083 % nebulizer solution Take 1 vial (2 5 mg total) by nebulization every 6 (six) hours as needed for wheezing or shortness of breath 100 vial 1    [DISCONTINUED] albuterol (PROAIR HFA) 90 mcg/act inhaler Inhale 2 puffs every 6 (six) hours as needed for wheezing 8 5 g 2    [DISCONTINUED] Evolocumab (REPATHA SURECLICK) 020 MG/ML SOAJ Inject 140 mg under the skin every 14 (fourteen) days       [DISCONTINUED] propafenone (RYTHMOL SR) 225 mg 12 hr capsule Take 1 capsule (225 mg total) by mouth every 12 (twelve) hours 60 capsule 0     No current facility-administered medications on file prior to visit        Family History   Problem Relation Age of Onset    Coronary artery disease Father     Hypertension Father     Heart disease Father         cardiac disorder     Social History     Socioeconomic History    Marital status: /Civil Union     Spouse name: Not on file    Number of children: 2    Years of education: Not on file    Highest education level: Not on file   Occupational History    Not on file   Social Needs    Financial resource strain: Not on file    Food insecurity:     Worry: Not on file     Inability: Not on file    Transportation needs:     Medical: Not on file     Non-medical: Not on file   Tobacco Use    Smoking status: Never Smoker    Smokeless tobacco: Never Used   Substance and Sexual Activity    Alcohol use: Never     Frequency: Never    Drug use: No    Sexual activity: Not on file   Lifestyle    Physical activity:     Days per week: Not on file     Minutes per session: Not on file    Stress: Not on file   Relationships    Social connections:     Talks on phone: Not on file     Gets together: Not on file     Attends [employer] service: Not on file     Active member of club or organization: Not on file     Attends meetings of clubs or organizations: Not on file     Relationship status: Not on file    Intimate partner violence:     Fear of current or ex partner: Not on file     Emotionally abused: Not on file     Physically abused: Not on file     Forced sexual activity: Not on file   Other Topics Concern    Not on file   Social History Narrative    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[employer]:    11/25/19 1100   BP: 122/84   BP Location: Left arm   Patient Position: Sitting   Cuff Size: Large   Pulse: 62   Resp: 16   Temp: 98 1 °F (36 7 °C)   TempSrc: Oral   SpO2: 92%   Weight: 125 kg (276 lb 8 oz)   Height: 5' 5" (1 651 m)     Results for orders placed or performed during the hospital encounter of 11/13/19   CBC and differential   Result Value Ref Range    WBC 4 93 4 31 - 10 16 Thousand/uL    RBC 5 04 3 88 - 5 62 Million/uL    Hemoglobin 15 6 12 0 - 17 0 g/dL    Hematocrit 45 6 36 5 - 49 3 %    MCV 91 82 - 98 fL    MCH 31 0 26 8 - 34 3 pg    MCHC 34 2 31 4 - 37 4 g/dL    RDW 12 9 11 6 - 15 1 %    MPV 10 0 8 9 - 12 7 fL    Platelets 626 509 - 640 Thousands/uL    nRBC 0 /100 WBCs    Neutrophils Relative 54 43 - 75 %    Immat GRANS % 0 0 - 2 %    Lymphocytes Relative 29 14 - 44 %    Monocytes Relative 11 4 - 12 %    Eosinophils Relative 5 0 - 6 %    Basophils Relative 1 0 - 1 %    Neutrophils Absolute 2 60 1 85 - 7 62 Thousands/µL    Immature Grans Absolute 0 01 0 00 - 0 20 Thousand/uL    Lymphocytes Absolute 1 45 0 60 - 4 47 Thousands/µL    Monocytes Absolute 0 56 0 17 - 1 22 Thousand/µL    Eosinophils Absolute 0 25 0 00 - 0 61 Thousand/µL    Basophils Absolute 0 06 0 00 - 0 10 Thousands/µL   Comprehensive metabolic panel   Result Value Ref Range    Sodium 140 136 - 145 mmol/L    Potassium 3 4 (L) 3 5 - 5 3 mmol/L    Chloride 107 100 - 108 mmol/L    CO2 27 21 - 32 mmol/L    ANION GAP 6 4 - 13 mmol/L BUN 10 5 - 25 mg/dL    Creatinine 0 75 0 60 - 1 30 mg/dL    Glucose 133 65 - 140 mg/dL    Calcium 9 0 8 3 - 10 1 mg/dL    AST 28 5 - 45 U/L    ALT 52 12 - 78 U/L    Alkaline Phosphatase 109 46 - 116 U/L    Total Protein 7 7 6 4 - 8 2 g/dL    Albumin 3 5 3 5 - 5 0 g/dL    Total Bilirubin 0 35 0 20 - 1 00 mg/dL    eGFR 96 ml/min/1 73sq m   Troponin I   Result Value Ref Range    Troponin I <0 02 <=0 04 ng/mL   TSH, 3rd generation with Free T4 reflex   Result Value Ref Range    TSH 3RD GENERATON 2 540 0 358 - 3 740 uIU/mL   NT-BNP PRO   Result Value Ref Range    NT-proBNP 268 (H) <125 pg/mL   Magnesium   Result Value Ref Range    Magnesium 2 1 1 6 - 2 6 mg/dL   CBC and differential   Result Value Ref Range    WBC 5 36 4 31 - 10 16 Thousand/uL    RBC 4 79 3 88 - 5 62 Million/uL    Hemoglobin 14 6 12 0 - 17 0 g/dL    Hematocrit 43 8 36 5 - 49 3 %    MCV 91 82 - 98 fL    MCH 30 5 26 8 - 34 3 pg    MCHC 33 3 31 4 - 37 4 g/dL    RDW 13 0 11 6 - 15 1 %    MPV 10 9 8 9 - 12 7 fL    Platelets 191 715 - 231 Thousands/uL    nRBC 0 /100 WBCs    Neutrophils Relative 46 43 - 75 %    Immat GRANS % 0 0 - 2 %    Lymphocytes Relative 36 14 - 44 %    Monocytes Relative 11 4 - 12 %    Eosinophils Relative 6 0 - 6 %    Basophils Relative 1 0 - 1 %    Neutrophils Absolute 2 43 1 85 - 7 62 Thousands/µL    Immature Grans Absolute 0 01 0 00 - 0 20 Thousand/uL    Lymphocytes Absolute 1 94 0 60 - 4 47 Thousands/µL    Monocytes Absolute 0 61 0 17 - 1 22 Thousand/µL    Eosinophils Absolute 0 30 0 00 - 0 61 Thousand/µL    Basophils Absolute 0 07 0 00 - 0 10 Thousands/µL   Basic metabolic panel   Result Value Ref Range    Sodium 140 136 - 145 mmol/L    Potassium 3 5 3 5 - 5 3 mmol/L    Chloride 108 100 - 108 mmol/L    CO2 26 21 - 32 mmol/L    ANION GAP 6 4 - 13 mmol/L    BUN 12 5 - 25 mg/dL    Creatinine 0 75 0 60 - 1 30 mg/dL    Glucose 107 65 - 140 mg/dL    Glucose, Fasting 107 (H) 65 - 99 mg/dL    Calcium 9 2 8 3 - 10 1 mg/dL    eGFR 96 ml/min/1 73sq m   Magnesium   Result Value Ref Range    Magnesium 2 1 1 6 - 2 6 mg/dL   ECG 12 lead   Result Value Ref Range    Ventricular Rate 86 BPM    Atrial Rate 86 BPM    OH Interval 182 ms    QRSD Interval 114 ms    QT Interval 380 ms    QTC Interval 454 ms    P Axis 46 degrees    QRS Axis 40 degrees    T Wave Axis 23 degrees   ECG 12 lead   Result Value Ref Range    Ventricular Rate 63 BPM    Atrial Rate 63 BPM    OH Interval 194 ms    QRSD Interval 110 ms    QT Interval 412 ms    QTC Interval 421 ms    P Axis 49 degrees    QRS Axis 48 degrees    T Wave Axis 20 degrees   ECG 12 lead   Result Value Ref Range    Ventricular Rate 128 BPM    Atrial Rate 156 BPM    OH Interval  ms    QRSD Interval 108 ms    QT Interval 338 ms    QTC Interval 493 ms    P Axis  degrees    QRS Axis 51 degrees    T Wave Axis 23 degrees   ECG 12 lead   Result Value Ref Range    Ventricular Rate 169 BPM    Atrial Rate 258 BPM    OH Interval  ms    QRSD Interval 98 ms    QT Interval 286 ms    QTC Interval 479 ms    P Axis  degrees    QRS Axis 68 degrees    T Wave Axis 37 degrees   ECG 12 lead   Result Value Ref Range    Ventricular Rate 133 BPM    Atrial Rate 144 BPM    OH Interval  ms    QRSD Interval 102 ms    QT Interval 304 ms    QTC Interval 452 ms    P Axis 0 degrees    QRS Axis 68 degrees    T Wave Axis 45 degrees     Weight (last 2 days)     Date/Time   Weight    11/25/19 1100   125 (276 5)            Body mass index is 46 01 kg/m²  BP      Temp      Pulse     Resp      SpO2        Vitals:    11/25/19 1100   Weight: 125 kg (276 lb 8 oz)     Vitals:    11/25/19 1100   Weight: 125 kg (276 lb 8 oz)        Physical Exam   Constitutional: He appears well-developed and well-nourished  No distress  HENT:   Head: Normocephalic and atraumatic  Right Ear: External ear normal    Left Ear: External ear normal    Mouth/Throat: Oropharynx is clear and moist    Eyes: Pupils are equal, round, and reactive to light   Conjunctivae are normal  Right eye exhibits no discharge  Left eye exhibits no discharge  No scleral icterus  Neck: Neck supple  Cardiovascular: Normal rate, regular rhythm and normal heart sounds  Exam reveals no gallop and no friction rub  No murmur heard  Pulmonary/Chest: No respiratory distress  He has no wheezes  He has no rales  Abdominal: Soft  Bowel sounds are normal  He exhibits no distension and no mass  There is no tenderness  There is no rebound and no guarding  Musculoskeletal: He exhibits no edema or deformity  Lymphadenopathy:     He has no cervical adenopathy  Neurological: He is alert  Skin: He is not diaphoretic  Psychiatric: He has a normal mood and affect  Vitals:    11/25/19 1100   BP: 122/84   BP Location: Left arm   Patient Position: Sitting   Cuff Size: Large   Pulse: 62   Resp: 16   Temp: 98 1 °F (36 7 °C)   TempSrc: Oral   SpO2: 92%   Weight: 125 kg (276 lb 8 oz)   Height: 5' 5" (1 651 m)       Transitional Care Management Review:  nAa Garza is a 71 y o  male here for TCM follow up  During the TCM phone call patient stated:    TCM Call (since 10/25/2019)     Date and time call was made  11/15/2019  8:11 AM    Hospital care reviewed  Records reviewed    Patient was hospitialized at  [facility]    Date of Admission  11/13/19    Date of discharge  11/14/19    Diagnosis  Atrial fibrillation    Disposition  Home    Were the patients medications reviewed and updated  No    Current Symptoms  None      TCM Call (since 10/25/2019)     Post hospital issues  None    Should patient be enrolled in anticoag monitoring? No    Scheduled for follow up? Yes    Did you obtain your prescribed medications  No    Why were you unable to obtain your medications  Checking in to a contraindication      Do you need help managing your prescriptions or medications  Yes    Why type of assitance do you need  Seeing Reginald Davila    I have advised the patient to call PCP with any new or worsening symptoms Hillary Davis, MR    Are you recieving any outpatient services  No    Are you recieving home care services  No    Are you using any community resources  No    Have you fallen in the last 12 months  Yes    How many times  1    Interperter language line needed  No    Counseling  Patient          Nasim Muro, DO

## 2019-11-25 NOTE — PROGRESS NOTES
[id number] [facility] Blunt, PharmD     Patient presents for medication review and education  [employer] did not present with all prescribed and OTC medications for medication review  Admitted from - for afib  Medication changes:   · Started rivaroxaban  · Amlodipine held d/t decreased BP (pt resumed prior to [facility] card appt on )  · Metoprolol succinate decreased to 12 5mg daily    SUBJECTIVE/ASSESSMENT                                                                                                 Medication list reviewed with patient, reports the following discrepancies/problems:   Taking metoprolol succinate 25mg daily vs 12 5mg daily, unaware dose was reduced   Restarted amlodipine d/t elevated home BP readings   Reduced aspirin to 81mg daily as discussed with cardiology   Fish oil, taking on own, denies having cardiology recommend he take   Furosemide, takes prior to bed d/t going to bathroom frequently during work when taking in AM   Does not take rivaroxaban with food    Home HR av-80  Home SBP av-170    Daughter is cardiologist PA for [employer] in [place]    Works from [employer] as     Miss doses:  no    # of times per day patient takes meds: 2   Use of supportive adherence tools:Yes, describe: pill box, sets up himself    Med cost concerns: no    ASSESSMENT/PLAN                                                                                                             Patient was counseled on current active medications and provided with a medication list which include names, dosages, indications and directions for use  If easier to administer medications, may consider taking once daily rx's in AM vs PM    Current Medication Recommendation   Fish Oil Noted last TGs acceptable and patient may not require fish oil as he is on very low dose   Discussion with patient on trial off, will finish current supply and discuss with cardiology   Furosemide May try taking furosemide earlier in the day (I e  When returning home from work) to reduce nocturia  Pt voiced understanding  Amlodipine May continue taking as home BP readings have improved  Will have patient address with cardiology/PCP     Metoprolol Succinate May continue with increased dose d/t elevated HR and will be f/u with cardiology/PCP   Rivaroxaban Discussion with patient on taking rx with food, voiced understanding     Follow-Up: if further medication questions/concerns    Demographics  Interaction Method: Face to Face  Type of Intervention: New    Topic(s) Addressed  Transitions of Care    Intervention(s) Made  Pharmacologic: Medication Adjustment - Dose or Frequency and Medication Discontinuation    Non-Pharmacologic: Adherence Addressed and Other    Tool(s) Used  Not Applicable    Time Spent in Direct Patient Care Activities and Care Coordination: 61-75 Minutes    Recommendation(s) Accepted by the Patient/Caregiver: Partially Accepted

## 2019-11-26 NOTE — ASSESSMENT & PLAN NOTE
Transition care management visit today we did review the discharge summary medication list consultations  Currently he is clinically stable doing well he will be seeing Cardiology and EP specialist for consideration of ablation  Of note the patient does have a Alvares's esophagus with mild dysplasia it will be undergoing upper endoscopy via GI in the near future  He is currently anticoagulated and rate controlled  I will check basic metabolic panel, magnesium and 3rd generation TSH   RTO 3 months call if any problems

## 2019-11-29 ENCOUNTER — APPOINTMENT (OUTPATIENT)
Dept: LAB | Age: 64
End: 2019-11-29
Payer: COMMERCIAL

## 2019-11-29 DIAGNOSIS — I48.92 ATRIAL FLUTTER, UNSPECIFIED TYPE (HCC): ICD-10-CM

## 2019-11-29 LAB
ANION GAP SERPL CALCULATED.3IONS-SCNC: 2 MMOL/L (ref 4–13)
BUN SERPL-MCNC: 9 MG/DL (ref 5–25)
CALCIUM SERPL-MCNC: 9.6 MG/DL (ref 8.3–10.1)
CHLORIDE SERPL-SCNC: 107 MMOL/L (ref 100–108)
CO2 SERPL-SCNC: 30 MMOL/L (ref 21–32)
CREAT SERPL-MCNC: 0.87 MG/DL (ref 0.6–1.3)
GFR SERPL CREATININE-BSD FRML MDRD: 91 ML/MIN/1.73SQ M
GLUCOSE P FAST SERPL-MCNC: 107 MG/DL (ref 65–99)
MAGNESIUM SERPL-MCNC: 2.4 MG/DL (ref 1.6–2.6)
POTASSIUM SERPL-SCNC: 3.8 MMOL/L (ref 3.5–5.3)
SODIUM SERPL-SCNC: 139 MMOL/L (ref 136–145)
TSH SERPL DL<=0.05 MIU/L-ACNC: 2.86 UIU/ML (ref 0.36–3.74)

## 2019-11-29 PROCEDURE — 84443 ASSAY THYROID STIM HORMONE: CPT

## 2019-11-29 PROCEDURE — 80048 BASIC METABOLIC PNL TOTAL CA: CPT

## 2019-11-29 PROCEDURE — 36415 COLL VENOUS BLD VENIPUNCTURE: CPT

## 2019-11-29 PROCEDURE — 83735 ASSAY OF MAGNESIUM: CPT

## 2019-12-10 ENCOUNTER — TELEPHONE (OUTPATIENT)
Dept: GASTROENTEROLOGY | Facility: CLINIC | Age: 64
End: 2019-12-10

## 2020-01-13 DIAGNOSIS — I48.91 ATRIAL FIBRILLATION WITH RVR (HCC): ICD-10-CM

## 2020-01-13 RX ORDER — RIVAROXABAN 20 MG/1
TABLET, FILM COATED ORAL
Qty: 30 TABLET | Refills: 0 | Status: SHIPPED | OUTPATIENT
Start: 2020-01-13

## 2020-01-17 NOTE — TELEPHONE ENCOUNTER
Patient aware ok to hold xarelto 2 days prior to procedure  Pat from cardiology will fax med clearance

## 2020-01-22 ENCOUNTER — ANESTHESIA EVENT (OUTPATIENT)
Dept: GASTROENTEROLOGY | Facility: HOSPITAL | Age: 65
End: 2020-01-22

## 2020-01-23 ENCOUNTER — HOSPITAL ENCOUNTER (OUTPATIENT)
Dept: GASTROENTEROLOGY | Facility: HOSPITAL | Age: 65
Setting detail: OUTPATIENT SURGERY
Discharge: HOME | End: 2020-01-23
Attending: INTERNAL MEDICINE | Admitting: INTERNAL MEDICINE
Payer: COMMERCIAL

## 2020-01-23 ENCOUNTER — ANESTHESIA (OUTPATIENT)
Dept: GASTROENTEROLOGY | Facility: HOSPITAL | Age: 65
End: 2020-01-23

## 2020-01-23 VITALS
DIASTOLIC BLOOD PRESSURE: 76 MMHG | BODY MASS INDEX: 44.65 KG/M2 | HEIGHT: 65 IN | RESPIRATION RATE: 18 BRPM | SYSTOLIC BLOOD PRESSURE: 133 MMHG | OXYGEN SATURATION: 97 % | HEART RATE: 51 BPM | WEIGHT: 268 LBS | TEMPERATURE: 96.9 F

## 2020-01-23 DIAGNOSIS — K31.89 GASTRIC NODULE: ICD-10-CM

## 2020-01-23 DIAGNOSIS — K22.710 BARRETT'S ESOPHAGUS WITH LOW GRADE DYSPLASIA: ICD-10-CM

## 2020-01-23 PROCEDURE — 88305 TISSUE EXAM BY PATHOLOGIST: CPT | Performed by: PATHOLOGY

## 2020-01-23 PROCEDURE — 43237 ENDOSCOPIC US EXAM ESOPH: CPT | Performed by: INTERNAL MEDICINE

## 2020-01-23 PROCEDURE — 43239 EGD BIOPSY SINGLE/MULTIPLE: CPT | Performed by: INTERNAL MEDICINE

## 2020-01-23 RX ORDER — LIDOCAINE HYDROCHLORIDE 10 MG/ML
INJECTION, SOLUTION EPIDURAL; INFILTRATION; INTRACAUDAL; PERINEURAL AS NEEDED
Status: DISCONTINUED | OUTPATIENT
Start: 2020-01-23 | End: 2020-01-23 | Stop reason: SURG

## 2020-01-23 RX ORDER — SODIUM CHLORIDE 9 MG/ML
125 INJECTION, SOLUTION INTRAVENOUS CONTINUOUS
Status: DISCONTINUED | OUTPATIENT
Start: 2020-01-23 | End: 2020-01-27 | Stop reason: HOSPADM

## 2020-01-23 RX ORDER — PROPOFOL 10 MG/ML
INJECTION, EMULSION INTRAVENOUS AS NEEDED
Status: DISCONTINUED | OUTPATIENT
Start: 2020-01-23 | End: 2020-01-23 | Stop reason: SURG

## 2020-01-23 RX ORDER — SODIUM CHLORIDE 9 MG/ML
INJECTION, SOLUTION INTRAVENOUS CONTINUOUS PRN
Status: DISCONTINUED | OUTPATIENT
Start: 2020-01-23 | End: 2020-01-23 | Stop reason: SURG

## 2020-01-23 RX ORDER — SUCCINYLCHOLINE/SOD CL,ISO/PF 100 MG/5ML
SYRINGE (ML) INTRAVENOUS AS NEEDED
Status: DISCONTINUED | OUTPATIENT
Start: 2020-01-23 | End: 2020-01-23 | Stop reason: SURG

## 2020-01-23 RX ADMIN — PROPOFOL 200 MG: 10 INJECTION, EMULSION INTRAVENOUS at 11:38

## 2020-01-23 RX ADMIN — SODIUM CHLORIDE 125 ML/HR: 0.9 INJECTION, SOLUTION INTRAVENOUS at 09:27

## 2020-01-23 RX ADMIN — Medication 100 MG: at 11:39

## 2020-01-23 RX ADMIN — SODIUM CHLORIDE: 0.9 INJECTION, SOLUTION INTRAVENOUS at 11:36

## 2020-01-23 RX ADMIN — LIDOCAINE HYDROCHLORIDE 80 MG: 10 INJECTION, SOLUTION EPIDURAL; INFILTRATION; INTRACAUDAL; PERINEURAL at 11:38

## 2020-01-23 NOTE — ANESTHESIA POSTPROCEDURE EVALUATION
Post-Op Assessment Note    CV Status:  Stable    Pain management: adequate     Mental Status:  Lethargic and awake   Hydration Status:  Stable   PONV Controlled:  None   Airway Patency:  Patent   Post Op Vitals Reviewed: Yes      Staff: CRNA           BP      Temp      Pulse     Resp      SpO2

## 2020-01-23 NOTE — H&P
History and Physical - [facility]  Hattie Jurado 71 y o  male MRN: [id number]    HPI: Hattie Jurado is a 71y o  year old male who presents with h/o gastric submucosal nodule and Alvares's esophagus  Review of Systems    Historical Information   Past Medical History:   Diagnosis Date    Allergic rhinitis due to pollen     last assessed 10/01/15    Aortic aneurysm (HCC)     4 3 cm  6/2018 last check    Alvares esophagus     CPAP (continuous positive airway pressure) dependence     GERD (gastroesophageal reflux disease)     Hyperlipidemia     Hypertension     Irregular heart beat     Mild intermittent asthma without complication     last assessed 10/01/15    Positive PPD     last assesssed 12/21/15    Sleep apnea     Stroke [facility])     unsure if/when happened; no weakness    Syncopal episodes     1 year ago    Vertigo     last assessed 04/24/17     Past Surgical History:   Procedure Laterality Date    COLONOSCOPY N/A 11/14/2018    Procedure: COLONOSCOPY;  Surgeon: Tamanna Vega MD;  Location:  GI LAB;   Service: Colorectal    EGD AND COLONOSCOPY      HERNIA REPAIR      JOINT REPLACEMENT Left     knee    TOTAL KNEE ARTHROPLASTY  08/29/2016    Dr Bryan Galdamez 07/28/16    URETHRA SURGERY      polyps removed     Social History   Social History     Substance and Sexual Activity   Alcohol Use Never    Frequency: Never     Social History     Substance and Sexual Activity   Drug Use No     Social History     Tobacco Use   Smoking Status Never Smoker   Smokeless Tobacco Never Used     Family History   Problem Relation Age of Onset    Coronary artery disease Father     Hypertension Father     Heart disease Father         cardiac disorder       Meds/Allergies       (Not in a hospital admission)    Allergies   Allergen Reactions    Iodine Shortness Of Breath     Other reaction(s): Respiratory Distress  Other reaction(s): Respiratory Distress    Omnipaque [Iohexol] Anaphylaxis    Erythromycin Hives     Other reaction(s): Other (See Comments)  Pleurisy   Pleurisy   Other reaction(s): Other (See Comments)  Pleurisy     Lisinopril Cough    Statins Myalgia       Objective     /68   Pulse 56   Temp 97 8 °F (36 6 °C) (Oral)   Resp 20   Ht 5' 5" (1 651 m)   Wt 122 kg (268 lb)   SpO2 96%   BMI 44 60 kg/m²       PHYSICAL EXAM    Gen: NAD  CV: RRR  CHEST: Clear  ABD: soft, NT/ND  EXT: no edema  Neuro: AAO      ASSESSMENT/PLAN:  This is a 71y o  year old male here for gastric nodule (EUS) and EGD for [facility]  PLAN:   Procedure: EGd/ EUS

## 2020-01-27 ENCOUNTER — OFFICE VISIT (OUTPATIENT)
Dept: INTERNAL MEDICINE CLINIC | Facility: CLINIC | Age: 65
End: 2020-01-27
Payer: COMMERCIAL

## 2020-01-27 VITALS
OXYGEN SATURATION: 95 % | SYSTOLIC BLOOD PRESSURE: 144 MMHG | DIASTOLIC BLOOD PRESSURE: 74 MMHG | HEART RATE: 62 BPM | WEIGHT: 271.6 LBS | BODY MASS INDEX: 45.25 KG/M2 | HEIGHT: 65 IN

## 2020-01-27 DIAGNOSIS — R82.90 URINE ABNORMALITY: Primary | ICD-10-CM

## 2020-01-27 DIAGNOSIS — R73.03 PREDIABETES: ICD-10-CM

## 2020-01-27 DIAGNOSIS — E78.5 HYPERLIPIDEMIA, UNSPECIFIED HYPERLIPIDEMIA TYPE: ICD-10-CM

## 2020-01-27 DIAGNOSIS — K22.710 BARRETT'S ESOPHAGUS WITH LOW GRADE DYSPLASIA: ICD-10-CM

## 2020-01-27 DIAGNOSIS — I48.92 ATRIAL FLUTTER, UNSPECIFIED TYPE (HCC): ICD-10-CM

## 2020-01-27 DIAGNOSIS — E66.01 MORBID OBESITY (HCC): ICD-10-CM

## 2020-01-27 PROCEDURE — 3008F BODY MASS INDEX DOCD: CPT | Performed by: INTERNAL MEDICINE

## 2020-01-27 PROCEDURE — 1036F TOBACCO NON-USER: CPT | Performed by: INTERNAL MEDICINE

## 2020-01-27 PROCEDURE — 99214 OFFICE O/P EST MOD 30 MIN: CPT | Performed by: INTERNAL MEDICINE

## 2020-01-27 PROCEDURE — 1160F RVW MEDS BY RX/DR IN RCRD: CPT | Performed by: INTERNAL MEDICINE

## 2020-01-27 RX ORDER — SOTALOL HYDROCHLORIDE 80 MG/1
20 TABLET ORAL DAILY
COMMUNITY
Start: 2020-01-16

## 2020-01-27 NOTE — PROGRESS NOTES
BMI Counseling: Body mass index is 45 2 kg/m²  The BMI is above normal  Nutrition recommendations include decreasing portion sizes and moderation in carbohydrate intake  Exercise recommendations include exercising 3-5 times per week  Assessment/Plan:    Alvares's esophagus with low grade dysplasia  Patient reports me he has recently seen GI and the Alvares's esophagus has now resolved encourage weight loss ulcer free diet    Atrial flutter (HCC)  Atrial fib lesion/flutter currently on sotalol rate controlled also anticoagulated Clinically stable and doing well continue the current medical regiment will continue monitor  Follow-up with Cardiology    Hyperlipidemia  Hyperlipidemia controlled continue with current medical regiment recommend a low-cholesterol diet and recommend routine exercise we will continue to monitor the progress  Morbid obesity ([facility] [address] )  Obesity -I have counseled patient following healthy and balanced diet, I would like the patient to lose weight, I would like the patient exercise routinely; we will continue monitor the patient's progress  Prediabetes  Pre diabetes -I have counseled the patient to follow a healthy balanced diet, I have counseled patient reduce carbohydrates and sweets in the diet, I would like the patient exercise routinely  I will be checking hemoglobin A1c and comprehensive metabolic panel  Have counseled patient about the prevention of diabetes, and the risk of progression to type 2 diabetes      Urine abnormality  Dribbling rule out BPH see Urology check PSA         Problem List Items Addressed This Visit        Digestive    RESOLVED: Alvares's esophagus with low grade dysplasia     Patient reports me he has recently seen GI and the Alvares's esophagus has now resolved encourage weight loss ulcer free diet            Cardiovascular and Mediastinum    Atrial flutter (HCC)     Atrial fib lesion/flutter currently on sotalol rate controlled also anticoagulated Clinically stable and doing well continue the current medical regiment will continue monitor  Follow-up with Cardiology         Relevant Medications    sotalol (BETAPACE) 80 mg tablet       Other    Morbid obesity ([address] )     Obesity -I have counseled patient following healthy and balanced diet, I would like the patient to lose weight, I would like the patient exercise routinely; we will continue monitor the patient's progress  Prediabetes     Pre diabetes -I have counseled the patient to follow a healthy balanced diet, I have counseled patient reduce carbohydrates and sweets in the diet, I would like the patient exercise routinely  I will be checking hemoglobin A1c and comprehensive metabolic panel  Have counseled patient about the prevention of diabetes, and the risk of progression to type 2 diabetes  Relevant Orders    Hemoglobin A1C    Hyperlipidemia     Hyperlipidemia controlled continue with current medical regiment recommend a low-cholesterol diet and recommend routine exercise we will continue to monitor the progress  Relevant Orders    Comprehensive metabolic panel    Lipid Panel with Direct LDL reflex    Urine abnormality - Primary     Dribbling rule out BPH see Urology check PSA         Relevant Orders    PSA, Total Screen    Ambulatory referral to Urology          Return to office 6  months  call if any problems  Subjective:      Patient ID: James Sidhu is a 71 y o  male  HPI 77-year old male coming in for a follow up visit regarding urine abnormality, hyperlipidemia, prediabetes, morbid obesity, atrial flutter/atrial fibrillation and Alvares's esophagus; The patient reports me compliant taking medications without untoward side effects the  The patient is here to review his medical condition, update me on the medical condition and the patient reports me no hospitalizations and no ER visits    the pt reports saw gi had procedure and dosent have barretts , card had pt go for cath , pt has 2 radial arteries  Couldn't go to pool for a coupl eweeks 50% blockages no stent the pt was hospitalized for 3 days to get sotolol  And he is tolerating the sotiolol and hr in the 40's therefore stopped the metoprolol    The following portions of the patient's history were reviewed and updated as appropriate: allergies, current medications, past family history, past medical history, past social history, past surgical history and problem list     Review of Systems   Constitutional: Negative for activity change, appetite change and unexpected weight change  HENT: Negative for congestion and postnasal drip  Eyes: Negative for visual disturbance  Respiratory: Negative for cough and shortness of breath  Cardiovascular: Negative for chest pain  Gastrointestinal: Negative for abdominal pain, diarrhea, nausea and vomiting  Neurological: Negative for dizziness, light-headedness and headaches  Hematological: Negative for adenopathy  Objective:    No follow-ups on file  No results found  Allergies   Allergen Reactions    Iodine Shortness Of Breath     Other reaction(s): Respiratory Distress  Other reaction(s): Respiratory Distress    Omnipaque [Iohexol] Anaphylaxis    Erythromycin Hives     Other reaction(s): Other (See Comments)  Pleurisy   Pleurisy   Other reaction(s):  Other (See Comments)  Pleurisy     Lisinopril Cough    Statins Myalgia       Past Medical History:   Diagnosis Date    Allergic rhinitis due to pollen     last assessed 10/01/15    Aortic aneurysm (HCC)     4 3 cm  6/2018 last check    Alvares esophagus     CPAP (continuous positive airway pressure) dependence     GERD (gastroesophageal reflux disease)     Hyperlipidemia     Hypertension     Irregular heart beat     Mild intermittent asthma without complication     last assessed 10/01/15    Positive PPD     last assesssed 12/21/15    Sleep apnea     Stroke [facility])     unsure if/when happened; no weakness    Syncopal episodes     1 year ago    Vertigo     last assessed 04/24/17     Past Surgical History:   Procedure Laterality Date    COLONOSCOPY N/A 11/14/2018    Procedure: COLONOSCOPY;  Surgeon: Anegl Albert MD;  [facility]; Service: Colorectal    EGD AND COLONOSCOPY      HERNIA REPAIR      JOINT REPLACEMENT Left     knee    TOTAL KNEE ARTHROPLASTY  08/29/2016    Dr Medel Romp 07/28/16    URETHRA SURGERY      polyps removed     Current Outpatient Medications on File Prior to Visit   Medication Sig Dispense Refill    aluminum-magnesium hydroxide-simethicone (MYLANTA) 200-200-20 mg/5 mL suspension Take 30 mL by mouth every 4 (four) hours as needed for indigestion or heartburn 355 mL 0    AMLODIPINE BESYLATE PO Take 10 mg by mouth daily      aspirin 81 MG tablet Take 81 mg by mouth       calcium carbonate (TUMS) 500 mg chewable tablet Chew 2 tablets every 4 (four) hours as needed       CHOLECALCIFEROL PO Take 2,000 Units by mouth Chew form      Coenzyme Q10 (COQ-10) 100 MG CAPS Take 100 mg by mouth daily       ezetimibe (ZETIA) 10 mg tablet TAKE 1 TABLET BY MOUTH EVERY DAY 90 tablet 3    fluticasone (FLONASE) 50 mcg/act nasal spray 2 sprays into each nostril daily as needed       furosemide (LASIX) 20 mg tablet TAKE 1 TABLET (20 MG TOTAL) BY MOUTH DAILY   90 tablet 3    KLOR-CON M10 10 MEQ tablet Take 2 tablets (20 mEq total) by mouth daily 60 tablet 11    multivitamin (THERAGRAN) TABS Take 1 tablet by mouth Gummy form      Omega-3 Fatty Acids (FISH OIL PO) Take 1 capsule by mouth daily Gummy form      valsartan (DIOVAN) 320 MG tablet TAKE 1 TABLET DAILY 90 tablet 1    XARELTO 20 MG tablet TAKE 1 TABLET BY MOUTH ONCE NDAILY WITH DINNER 30 tablet 0    famotidine (PEPCID) 40 MG tablet Take 1 tablet (40 mg total) by mouth 2 (two) times a day 180 tablet 2    sotalol (BETAPACE) 80 mg tablet        Current Facility-Administered Medications on File Prior to Visit   Medication Dose Route Frequency Provider Last Rate Last Dose    [DISCONTINUED] sodium chloride 0 9 % infusion  125 mL/hr Intravenous Continuous Syed Pathak  mL/hr at 01/23/20 0927 125 mL/hr at 01/23/20 0927     Family History   Problem Relation Age of Onset    Coronary artery disease Father     Hypertension Father     Heart disease Father         cardiac disorder     Social History     Socioeconomic History    Marital status: /Civil Union     Spouse name: Not on file    Number of children: 2    Years of education: Not on file    Highest education level: Not on file   Occupational History    Not on file   Social Needs    Financial resource strain: Not on file    Food insecurity:     Worry: Not on file     Inability: Not on file    Transportation needs:     Medical: Not on file     Non-medical: Not on file   Tobacco Use    Smoking status: Never Smoker    Smokeless tobacco: Never Used   Substance and Sexual Activity    Alcohol use: Never     Frequency: Never    Drug use: No    Sexual activity: Not on file   Lifestyle    Physical activity:     Days per week: Not on file     Minutes per session: Not on file    Stress: Not on file   Relationships    Social connections:     Talks on phone: Not on file     Gets together: Not on file     Attends Mu-ism service: Not on file     Active member of club or organization: Not on file     Attends meetings of clubs or organizations: Not on file     Relationship status: Not on file    Intimate partner violence:     Fear of current or ex partner: Not on file     Emotionally abused: Not on file     Physically abused: Not on file     Forced sexual activity: Not on file   Other Topics Concern    Not on file   Social History Narrative    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[employer]:    01/27/20 1640   BP: 144/74   Pulse: 62   SpO2: 95%   Weight: 123 kg (271 lb 9 6 oz)   Height: 5' 5" (1 651 m)     Results for orders placed or performed in visit on 11/29/19 Basic metabolic panel   Result Value Ref Range    Sodium 139 136 - 145 mmol/L    Potassium 3 8 3 5 - 5 3 mmol/L    Chloride 107 100 - 108 mmol/L    CO2 30 21 - 32 mmol/L    ANION GAP 2 (L) 4 - 13 mmol/L    BUN 9 5 - 25 mg/dL    Creatinine 0 87 0 60 - 1 30 mg/dL    Glucose, Fasting 107 (H) 65 - 99 mg/dL    Calcium 9 6 8 3 - 10 1 mg/dL    eGFR 91 ml/min/1 73sq m   Magnesium   Result Value Ref Range    Magnesium 2 4 1 6 - 2 6 mg/dL   TSH, 3rd generation   Result Value Ref Range    TSH 3RD GENERATON 2 860 0 358 - 3 740 uIU/mL     Weight (last 2 days)     Date/Time   Weight    01/27/20 1640   123 (271 6)            Body mass index is 45 2 kg/m²  BP      Temp      Pulse     Resp      SpO2        Vitals:    01/27/20 1640   Weight: 123 kg (271 lb 9 6 oz)     Vitals:    01/27/20 1640   Weight: 123 kg (271 lb 9 6 oz)       /74   Pulse 62   Ht 5' 5" (1 651 m)   Wt 123 kg (271 lb 9 6 oz)   SpO2 95%   BMI 45 20 kg/m²          Physical Exam   Constitutional: He appears well-developed and well-nourished  No distress  HENT:   Head: Normocephalic and atraumatic  Right Ear: External ear normal    Left Ear: External ear normal    Nose: Nose normal    Mouth/Throat: Oropharynx is clear and moist  No oropharyngeal exudate  Eyes: Pupils are equal, round, and reactive to light  Conjunctivae and EOM are normal  Right eye exhibits no discharge  Left eye exhibits no discharge  No scleral icterus  Cardiovascular: Normal heart sounds  No murmur heard  Pulmonary/Chest: No respiratory distress  He has no wheezes  He has no rales  Lymphadenopathy:     He has no cervical adenopathy  Skin: He is not diaphoretic

## 2020-01-28 NOTE — ASSESSMENT & PLAN NOTE
Atrial fib lesion/flutter currently on sotalol rate controlled also anticoagulated Clinically stable and doing well continue the current medical regiment will continue monitor    Follow-up with Cardiology

## 2020-01-28 NOTE — ASSESSMENT & PLAN NOTE
Patient reports me he has recently seen GI and the Alvares's esophagus has now resolved encourage weight loss ulcer free diet

## 2020-02-16 DIAGNOSIS — R60.9 EDEMA, UNSPECIFIED TYPE: ICD-10-CM

## 2020-02-16 RX ORDER — FUROSEMIDE 20 MG/1
TABLET ORAL
Qty: 90 TABLET | Refills: 3 | Status: SHIPPED | OUTPATIENT
Start: 2020-02-16

## 2020-04-24 ENCOUNTER — TELEMEDICINE (OUTPATIENT)
Dept: NEUROLOGY | Facility: CLINIC | Age: 65
End: 2020-04-24
Payer: COMMERCIAL

## 2020-04-24 DIAGNOSIS — I63.81 RIGHT-SIDED LACUNAR INFARCTION (HCC): Primary | ICD-10-CM

## 2020-04-24 DIAGNOSIS — I48.92 ATRIAL FLUTTER, UNSPECIFIED TYPE (HCC): ICD-10-CM

## 2020-04-24 PROCEDURE — 99214 OFFICE O/P EST MOD 30 MIN: CPT | Performed by: NURSE PRACTITIONER

## 2020-04-27 ENCOUNTER — TELEPHONE (OUTPATIENT)
Dept: NEUROLOGY | Facility: CLINIC | Age: 65
End: 2020-04-27

## 2020-05-22 ENCOUNTER — TELEPHONE (OUTPATIENT)
Dept: INTERNAL MEDICINE CLINIC | Facility: CLINIC | Age: 65
End: 2020-05-22

## 2020-05-22 DIAGNOSIS — E87.6 HYPOKALEMIA: ICD-10-CM

## 2020-05-22 RX ORDER — POTASSIUM CHLORIDE 750 MG/1
20 TABLET, EXTENDED RELEASE ORAL DAILY
Qty: 60 TABLET | Refills: 11 | Status: SHIPPED | OUTPATIENT
Start: 2020-05-22 | End: 2021-05-26

## 2020-05-24 DIAGNOSIS — I10 ESSENTIAL HYPERTENSION: Primary | ICD-10-CM

## 2020-05-24 RX ORDER — VALSARTAN 160 MG/1
TABLET ORAL
Qty: 180 TABLET | Refills: 0 | Status: SHIPPED | OUTPATIENT
Start: 2020-05-24 | End: 2020-05-27 | Stop reason: SDUPTHER

## 2020-05-27 ENCOUNTER — OFFICE VISIT (OUTPATIENT)
Dept: INTERNAL MEDICINE CLINIC | Facility: CLINIC | Age: 65
End: 2020-05-27
Payer: COMMERCIAL

## 2020-05-27 VITALS
DIASTOLIC BLOOD PRESSURE: 82 MMHG | OXYGEN SATURATION: 96 % | TEMPERATURE: 97.1 F | SYSTOLIC BLOOD PRESSURE: 142 MMHG | HEART RATE: 66 BPM | WEIGHT: 279.6 LBS | RESPIRATION RATE: 16 BRPM | HEIGHT: 65 IN | BODY MASS INDEX: 46.58 KG/M2

## 2020-05-27 DIAGNOSIS — I10 ESSENTIAL HYPERTENSION: ICD-10-CM

## 2020-05-27 DIAGNOSIS — G47.33 OSA ON CPAP: ICD-10-CM

## 2020-05-27 DIAGNOSIS — Z00.00 HEALTHCARE MAINTENANCE: Primary | ICD-10-CM

## 2020-05-27 DIAGNOSIS — I48.0 PAROXYSMAL A-FIB (HCC): ICD-10-CM

## 2020-05-27 DIAGNOSIS — Z99.89 OSA ON CPAP: ICD-10-CM

## 2020-05-27 PROCEDURE — 1036F TOBACCO NON-USER: CPT | Performed by: NURSE PRACTITIONER

## 2020-05-27 PROCEDURE — 1125F AMNT PAIN NOTED PAIN PRSNT: CPT | Performed by: NURSE PRACTITIONER

## 2020-05-27 PROCEDURE — 3078F DIAST BP <80 MM HG: CPT | Performed by: NURSE PRACTITIONER

## 2020-05-27 PROCEDURE — 3008F BODY MASS INDEX DOCD: CPT | Performed by: NURSE PRACTITIONER

## 2020-05-27 PROCEDURE — 3074F SYST BP LT 130 MM HG: CPT | Performed by: NURSE PRACTITIONER

## 2020-05-27 PROCEDURE — G0439 PPPS, SUBSEQ VISIT: HCPCS | Performed by: NURSE PRACTITIONER

## 2020-05-27 PROCEDURE — 1170F FXNL STATUS ASSESSED: CPT | Performed by: NURSE PRACTITIONER

## 2020-05-28 ENCOUNTER — OFFICE VISIT (OUTPATIENT)
Dept: SLEEP CENTER | Facility: CLINIC | Age: 65
End: 2020-05-28
Payer: COMMERCIAL

## 2020-05-28 VITALS
BODY MASS INDEX: 47.32 KG/M2 | WEIGHT: 284 LBS | HEIGHT: 65 IN | HEART RATE: 61 BPM | DIASTOLIC BLOOD PRESSURE: 60 MMHG | SYSTOLIC BLOOD PRESSURE: 120 MMHG

## 2020-05-28 DIAGNOSIS — G47.33 OSA (OBSTRUCTIVE SLEEP APNEA): Primary | ICD-10-CM

## 2020-05-28 PROCEDURE — 1036F TOBACCO NON-USER: CPT | Performed by: INTERNAL MEDICINE

## 2020-05-28 PROCEDURE — 3008F BODY MASS INDEX DOCD: CPT | Performed by: INTERNAL MEDICINE

## 2020-05-28 PROCEDURE — 3074F SYST BP LT 130 MM HG: CPT | Performed by: INTERNAL MEDICINE

## 2020-05-28 PROCEDURE — 99214 OFFICE O/P EST MOD 30 MIN: CPT | Performed by: INTERNAL MEDICINE

## 2020-05-28 PROCEDURE — 3078F DIAST BP <80 MM HG: CPT | Performed by: INTERNAL MEDICINE

## 2020-06-05 ENCOUNTER — TELEPHONE (OUTPATIENT)
Dept: SLEEP CENTER | Facility: CLINIC | Age: 65
End: 2020-06-05

## 2020-06-22 ENCOUNTER — OFFICE VISIT (OUTPATIENT)
Dept: NEUROLOGY | Facility: CLINIC | Age: 65
End: 2020-06-22
Payer: COMMERCIAL

## 2020-06-22 VITALS
WEIGHT: 284.4 LBS | SYSTOLIC BLOOD PRESSURE: 146 MMHG | HEART RATE: 49 BPM | BODY MASS INDEX: 47.33 KG/M2 | DIASTOLIC BLOOD PRESSURE: 73 MMHG | TEMPERATURE: 97.8 F

## 2020-06-22 DIAGNOSIS — Z86.73 HISTORY OF TRANSIENT ISCHEMIC ATTACK (TIA): Primary | ICD-10-CM

## 2020-06-22 DIAGNOSIS — I10 ESSENTIAL HYPERTENSION: ICD-10-CM

## 2020-06-22 DIAGNOSIS — I48.0 PAROXYSMAL ATRIAL FIBRILLATION (HCC): ICD-10-CM

## 2020-06-22 DIAGNOSIS — I63.81 RIGHT-SIDED LACUNAR INFARCTION (HCC): ICD-10-CM

## 2020-06-22 DIAGNOSIS — I35.9 AORTIC VALVE DISORDER: ICD-10-CM

## 2020-06-22 DIAGNOSIS — E66.01 MORBID OBESITY (HCC): ICD-10-CM

## 2020-06-22 DIAGNOSIS — I71.2 THORACIC AORTIC ANEURYSM WITHOUT RUPTURE (HCC): ICD-10-CM

## 2020-06-22 DIAGNOSIS — Z99.89 OSA ON CPAP: ICD-10-CM

## 2020-06-22 DIAGNOSIS — G47.33 OSA ON CPAP: ICD-10-CM

## 2020-06-22 PROCEDURE — 3078F DIAST BP <80 MM HG: CPT | Performed by: PSYCHIATRY & NEUROLOGY

## 2020-06-22 PROCEDURE — 1160F RVW MEDS BY RX/DR IN RCRD: CPT | Performed by: PSYCHIATRY & NEUROLOGY

## 2020-06-22 PROCEDURE — 1170F FXNL STATUS ASSESSED: CPT | Performed by: PSYCHIATRY & NEUROLOGY

## 2020-06-22 PROCEDURE — 99214 OFFICE O/P EST MOD 30 MIN: CPT | Performed by: PSYCHIATRY & NEUROLOGY

## 2020-06-22 PROCEDURE — 3077F SYST BP >= 140 MM HG: CPT | Performed by: PSYCHIATRY & NEUROLOGY

## 2020-06-22 PROCEDURE — 1036F TOBACCO NON-USER: CPT | Performed by: PSYCHIATRY & NEUROLOGY

## 2020-06-24 PROBLEM — I48.0 PAROXYSMAL ATRIAL FIBRILLATION (HCC): Status: ACTIVE | Noted: 2019-11-13

## 2020-06-24 PROBLEM — Z88.8 ALLERGY TO IODINE: Status: ACTIVE | Noted: 2020-01-03

## 2020-06-24 PROBLEM — I48.4 ATYPICAL ATRIAL FLUTTER (HCC): Status: ACTIVE | Noted: 2019-11-25

## 2020-06-24 PROBLEM — Z79.899 HIGH RISK MEDICATION USE: Status: ACTIVE | Noted: 2020-05-13

## 2020-06-24 PROBLEM — Z79.01 ANTICOAGULATED: Status: ACTIVE | Noted: 2020-05-13

## 2020-06-24 RX ORDER — SODIUM FLUORIDE 1.1 G/100G
GEL, DENTIFRICE ORAL
COMMUNITY
Start: 2020-06-19

## 2020-06-24 RX ORDER — AMLODIPINE BESYLATE 10 MG/1
10 TABLET ORAL DAILY
COMMUNITY
Start: 2020-06-10 | End: 2022-03-01 | Stop reason: SDUPTHER

## 2020-06-24 RX ORDER — AMLODIPINE BESYLATE 10 MG/1
TABLET ORAL
COMMUNITY
Start: 2020-06-10 | End: 2021-03-31 | Stop reason: SDUPTHER

## 2020-08-20 ENCOUNTER — OFFICE VISIT (OUTPATIENT)
Dept: SLEEP CENTER | Facility: CLINIC | Age: 65
End: 2020-08-20
Payer: COMMERCIAL

## 2020-08-20 VITALS
WEIGHT: 287 LBS | BODY MASS INDEX: 47.82 KG/M2 | HEART RATE: 55 BPM | HEIGHT: 65 IN | DIASTOLIC BLOOD PRESSURE: 60 MMHG | SYSTOLIC BLOOD PRESSURE: 110 MMHG

## 2020-08-20 DIAGNOSIS — G47.33 OSA (OBSTRUCTIVE SLEEP APNEA): Primary | ICD-10-CM

## 2020-08-20 PROCEDURE — 3074F SYST BP LT 130 MM HG: CPT | Performed by: INTERNAL MEDICINE

## 2020-08-20 PROCEDURE — 3078F DIAST BP <80 MM HG: CPT | Performed by: INTERNAL MEDICINE

## 2020-08-20 PROCEDURE — 99213 OFFICE O/P EST LOW 20 MIN: CPT | Performed by: INTERNAL MEDICINE

## 2020-08-20 PROCEDURE — 3008F BODY MASS INDEX DOCD: CPT | Performed by: INTERNAL MEDICINE

## 2020-08-20 PROCEDURE — 1036F TOBACCO NON-USER: CPT | Performed by: INTERNAL MEDICINE

## 2020-08-20 PROCEDURE — 1160F RVW MEDS BY RX/DR IN RCRD: CPT | Performed by: INTERNAL MEDICINE

## 2020-08-20 NOTE — PROGRESS NOTES
Progress Note - Sleep Center   Cherry Cheng CIR:[birth year] MRN: [id number]      Reason for Visit:  76 y o male here for PAP compliance check    Assessment:  Doing well with new PAP device  Sleep quality is improved and the patient feels less drowsy  Compliance data show utilization for greater than or equal to 70% of nights, for greater than or equal to 4 hours per night  Plan:  Adequate compliance and successful treatment    Follow up: One year    History of Present Illness:  History of VIRGEN on PAP therapy  Meets adequate compliance  Review of Systems      Genitourinary need to urinate more than twice a night   Cardiology none   Gastrointestinal none   Neurology none   Constitutional none   Integumentary none   Psychiatry none   Musculoskeletal none   Pulmonary none   ENT none   Endocrine frequent urination   Hematological none           I have reviewed and updated the review of systems as necessary    Historical Information    Past Medical History:   Diagnosis Date    Allergic rhinitis due to pollen     last assessed 10/01/15    Aortic aneurysm ([facility] [address] )     4 3 cm  6/2018 last check    Alvares esophagus     CPAP (continuous positive airway pressure) dependence     GERD (gastroesophageal reflux disease)     Hyperlipidemia     Hypertension     Irregular heart beat     Mild intermittent asthma without complication     last assessed 10/01/15    Positive PPD     last assesssed 12/21/15    Sleep apnea     Stroke [facility])     unsure if/when happened; no weakness    Syncopal episodes     1 year ago    Vertigo     last assessed 04/24/17         Past Surgical History:   Procedure Laterality Date    COLONOSCOPY N/A 11/14/2018    Procedure: COLONOSCOPY;  Surgeon: Richmond Lam MD;  [facility];   Service: Colorectal    EGD AND COLONOSCOPY      HERNIA REPAIR      JOINT REPLACEMENT Left     knee    MULTIPLE TOOTH EXTRACTIONS  05/2020    TOTAL KNEE ARTHROPLASTY  08/29/2016    Dr Lisbeth Rodriguez 07/28/16    URETHRA SURGERY      polyps removed         Social History     Socioeconomic History    Marital status: /Civil Union     Spouse name: None    Number of children: 2    Years of education: None    Highest education level: None   Occupational History    None   Social Needs    Financial resource strain: None    Food insecurity     Worry: None     Inability: None    Transportation needs     Medical: None     Non-medical: None   Tobacco Use    Smoking status: Never Smoker    Smokeless tobacco: Never Used   Substance and Sexual Activity    Alcohol use: Never     Frequency: Never    Drug use: No    Sexual activity: None   Lifestyle    Physical activity     Days per week: None     Minutes per session: None    Stress: None   Relationships    Social connections     Talks on phone: None     Gets together: None     Attends Latter day service: None     Active member of club or organization: None     Attends meetings of clubs or organizations: None     Relationship status: None    Intimate partner violence     Fear of current or ex partner: None     Emotionally abused: None     Physically abused: None     Forced sexual activity: None   Other Topics Concern    None   Social History Narrative    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   Problem Relation Age of Onset    Coronary artery disease Father     Hypertension Father     Heart disease Father         cardiac disorder       Medications/Allergies:      Current Outpatient Medications:     aluminum-magnesium hydroxide-simethicone (MYLANTA) 200-200-20 mg/5 mL suspension, Take 30 mL by mouth every 4 (four) hours as needed for indigestion or heartburn, Disp: 355 mL, Rfl: 0    amLODIPine (NORVASC) 10 mg tablet, TAKE 1 TABLET BY MOUTH EVERY DAY, Disp: , Rfl:     amLODIPine (NORVASC) 10 mg tablet, Take 10 mg by mouth daily, Disp: , Rfl:     aspirin 81 MG tablet, Take 81 mg by mouth , Disp: , Rfl:     calcium carbonate (TUMS) 500 mg chewable tablet, Chew 2 tablets every 4 (four) hours as needed , Disp: , Rfl:     CHOLECALCIFEROL PO, Take 2,000 Units by mouth Chew form, Disp: , Rfl:     cimetidine (TAGAMET) 200 mg tablet, Take 200 mg by mouth 4 (four) times a day as needed , Disp: , Rfl:     Coenzyme Q10 (COQ-10) 100 MG CAPS, Take 100 mg by mouth daily , Disp: , Rfl:     DENTA 5000 PLUS 1 1 % CREA, BRUSH TWICE DAILY  AFTER PM BRUSHING EXPECTORATE BUT DO NOT RINSE, Disp: , Rfl:     ezetimibe (ZETIA) 10 mg tablet, TAKE 1 TABLET BY MOUTH EVERY DAY, Disp: 90 tablet, Rfl: 3    fluticasone (FLONASE) 50 mcg/act nasal spray, 2 sprays into each nostril daily as needed , Disp: , Rfl:     furosemide (LASIX) 20 mg tablet, TAKE 1 TABLET (20 MG TOTAL) BY MOUTH DAILY  , Disp: 90 tablet, Rfl: 3    KLOR-CON M10 10 MEQ tablet, TAKE 2 TABLETS (20 MEQ TOTAL) BY MOUTH DAILY, Disp: 60 tablet, Rfl: 11    multivitamin (THERAGRAN) TABS, Take 1 tablet by mouth Gummy form, Disp: , Rfl:     Omega-3 Fatty Acids (FISH OIL PO), Take 1 capsule by mouth daily Gummy form, Disp: , Rfl:     sotalol (BETAPACE) 80 mg tablet, Take 80 mg by mouth 2 (two) times a day , Disp: , Rfl:     valsartan (DIOVAN) 320 MG tablet, TAKE 1 TABLET DAILY, Disp: 90 tablet, Rfl: 1    XARELTO 20 MG tablet, TAKE 1 TABLET BY MOUTH ONCE NDAILY WITH DINNER, Disp: 30 tablet, Rfl: 0      Objective    Vital Signs:   Vitals:    08/20/20 1459   BP: 110/60   Pulse: 55     [place] Sleepiness Scale: Total score: 0        Physical Exam:    General: Alert, appropriate, cooperative, overweight    Head: NC/AT    Skin: Warm, dry    Neuro: No motor abnormalities, cranial nerves appear intact    Extremity: No clubbing, cyanosis    PAP setting:   10 cm  DME Provider: [employer]  Test results:  Obstructive sleep apnea    Counseling / Coordination of Care  Total clinic time spent today 15 minutes  A description of the counseling / coordination of care: Maintain compliance   Discussed equipment  NEYDA Aguilera    Board Certified Sleep Specialist

## 2020-09-04 DIAGNOSIS — I10 ESSENTIAL HYPERTENSION: ICD-10-CM

## 2020-09-04 RX ORDER — VALSARTAN 320 MG/1
320 TABLET ORAL DAILY
Qty: 90 TABLET | Refills: 1 | Status: SHIPPED | OUTPATIENT
Start: 2020-09-04 | End: 2021-08-09

## 2021-03-10 DIAGNOSIS — Z23 ENCOUNTER FOR IMMUNIZATION: ICD-10-CM

## 2021-03-26 ENCOUNTER — TELEPHONE (OUTPATIENT)
Dept: INTERNAL MEDICINE CLINIC | Facility: CLINIC | Age: 66
End: 2021-03-26

## 2021-03-26 NOTE — TELEPHONE ENCOUNTER
The patient would like to have routine blood work done before he sees you and Dr Matt Gutierrez on June 3rd  Please advise  Thank you  Please mail the orders to the patient

## 2021-03-26 NOTE — TELEPHONE ENCOUNTER
Please have patient go for comprehensive metabolic panel and lipid panel prior to the visit diagnosis cardiovascular screening

## 2021-03-29 ENCOUNTER — TELEPHONE (OUTPATIENT)
Dept: NEUROLOGY | Facility: CLINIC | Age: 66
End: 2021-03-29

## 2021-03-29 DIAGNOSIS — Z13.6 SCREENING FOR CARDIOVASCULAR CONDITION: Primary | ICD-10-CM

## 2021-03-30 ENCOUNTER — OFFICE VISIT (OUTPATIENT)
Dept: NEUROLOGY | Facility: CLINIC | Age: 66
End: 2021-03-30
Payer: COMMERCIAL

## 2021-03-30 VITALS
BODY MASS INDEX: 48.98 KG/M2 | HEART RATE: 68 BPM | SYSTOLIC BLOOD PRESSURE: 138 MMHG | WEIGHT: 294 LBS | HEIGHT: 65 IN | DIASTOLIC BLOOD PRESSURE: 80 MMHG

## 2021-03-30 DIAGNOSIS — Z86.73 HISTORY OF TRANSIENT ISCHEMIC ATTACK (TIA): ICD-10-CM

## 2021-03-30 DIAGNOSIS — I63.81 RIGHT-SIDED LACUNAR INFARCTION (HCC): Primary | ICD-10-CM

## 2021-03-30 PROCEDURE — 3079F DIAST BP 80-89 MM HG: CPT | Performed by: PSYCHIATRY & NEUROLOGY

## 2021-03-30 PROCEDURE — 99215 OFFICE O/P EST HI 40 MIN: CPT | Performed by: PSYCHIATRY & NEUROLOGY

## 2021-03-30 PROCEDURE — 3075F SYST BP GE 130 - 139MM HG: CPT | Performed by: PSYCHIATRY & NEUROLOGY

## 2021-03-30 PROCEDURE — 1036F TOBACCO NON-USER: CPT | Performed by: PSYCHIATRY & NEUROLOGY

## 2021-03-30 PROCEDURE — 3008F BODY MASS INDEX DOCD: CPT | Performed by: PSYCHIATRY & NEUROLOGY

## 2021-03-30 NOTE — PROGRESS NOTES
[place] [address] [facility] [address]  Follow Up Visit    Impression/Plan    Mr Jay Canavan is a 76 y o  male diagnosed with TIA in 3/2017  No events since  He is now on Xarelto and aspirin in the setting of atrial flutter  He is on Zetia  He did not tolerate pravastatin  Updated lipid panel ordered on 3/29  Last HbA1c was 5 8 in 3/2019  Form completed today in office supporting continued driving  He is aware of the signs/symptoms of stroke and when to contact 911  Patient Instructions   Return in about one year  Diagnoses and all orders for this visit:    Right-sided lacunar infarction [facility])    History of transient ischemic attack (TIA)        Subjective    Julio Kenny is returning to the [place] [address] [facility] for follow up  He was previously followed by Dr Ruffus Buerger regarding TIA initially occurring in 3/2017  Additional background his documented in the 6/1/2018 note  History is from the patient and review of prior recording including office notes, imaging reports and hospital records  Interval Events: There have been no concerning neurological events since last visit  He describes the event in 3/2017  He had just finished urinating and was walking out of the bathroom  He suddenly became weak and went down somewhat gradually in the doorway  Dr Jose Roberto Mcgowan described the weakness as "perhaps worse in the left leg" and that he continued to drift toward the left for the 2 days after the event  He did not have a typical presyncope prodrome and did not pass out  It was thought that the event was most likely a right hemisphere TIA rather than syncope or seizure  Workup revealed ulcerated plaque in the right carotid and 2 small areas of old infarction in the right basal ganglia (notes mention MRI appearance of strokes, but I only see CTs in the EMR)  He was placed on dual anti-platelet therapy with Plavix and ASA as well as pravastatin   He has since been diagnosed with atrial flutter and placed on Xarelto  He also remains on aspirin  He follows with Cardiology  History Reviewed: The following were reviewed and updated as appropriate: allergies, current medications, past family history, past medical history, past social history, past surgical history and problem list    Psychiatric History:  None    Social History:   Driving: Yes  Lives Alone: No  Occupation: retired [employer]  Drives [employer]  Objective    /80 (BP Location: Left arm, Patient Position: Sitting, Cuff Size: Large)   Pulse 68   Ht 5' 5" (1 651 m)   Wt 133 kg (294 lb)   BMI 48 92 kg/m²      General Exam  No acute distress  Neurologic Exam  Mental Status:  Alert and oriented x 3  Language: normal fluency and comprehension  Cranial Nerves:  PERRL  VFFTC  EOMI, no nystagums  Face symmetric  No dysarthria  Motor:  Normal tone  No drift  Strength 5/5 throughout  Coordination: Finger to nose intact  DTRs: Normal and symmetric (biceps)  Sensation: normal LT distally  Gait: Normal casual gait  Normal Romberg  Total time spent on day of encounter: 55 minutes  The time was spent reviewing testing, obtaining/reviewing history, performing an exam, counseling/educating, ordering medication/tests/procedures, referring/communicating with other healthcare providers, documenting clinical information in the EMR, independently interpreting EEG/imaging results, and/ or coordinating care

## 2021-04-26 ENCOUNTER — APPOINTMENT (OUTPATIENT)
Dept: LAB | Age: 66
End: 2021-04-26
Payer: COMMERCIAL

## 2021-04-26 ENCOUNTER — TRANSCRIBE ORDERS (OUTPATIENT)
Dept: ADMINISTRATIVE | Age: 66
End: 2021-04-26

## 2021-04-26 DIAGNOSIS — I10 ESSENTIAL HYPERTENSION, MALIGNANT: ICD-10-CM

## 2021-04-26 DIAGNOSIS — I35.9 AORTIC VALVE DISEASE: ICD-10-CM

## 2021-04-26 DIAGNOSIS — I72.0 ANEURYSM OF ARTERY OF NECK (HCC): ICD-10-CM

## 2021-04-26 DIAGNOSIS — Z13.6 SCREENING FOR CARDIOVASCULAR CONDITION: ICD-10-CM

## 2021-04-26 DIAGNOSIS — G47.33 OBSTRUCTIVE SLEEP APNEA (ADULT) (PEDIATRIC): ICD-10-CM

## 2021-04-26 DIAGNOSIS — E66.01 MORBID OBESITY (HCC): ICD-10-CM

## 2021-04-26 DIAGNOSIS — I10 ESSENTIAL HYPERTENSION, MALIGNANT: Primary | ICD-10-CM

## 2021-04-26 DIAGNOSIS — I48.0 PAROXYSMAL ATRIAL FIBRILLATION (HCC): ICD-10-CM

## 2021-04-26 DIAGNOSIS — E78.9 DISORDER OF LIPOPROTEIN AND LIPID METABOLISM: ICD-10-CM

## 2021-04-26 LAB
ALBUMIN SERPL BCP-MCNC: 3.5 G/DL (ref 3.5–5)
ALP SERPL-CCNC: 75 U/L (ref 46–116)
ALT SERPL W P-5'-P-CCNC: 72 U/L (ref 12–78)
ANION GAP SERPL CALCULATED.3IONS-SCNC: 4 MMOL/L (ref 4–13)
AST SERPL W P-5'-P-CCNC: 45 U/L (ref 5–45)
BILIRUB SERPL-MCNC: 0.72 MG/DL (ref 0.2–1)
BUN SERPL-MCNC: 9 MG/DL (ref 5–25)
CALCIUM SERPL-MCNC: 8.8 MG/DL (ref 8.3–10.1)
CHLORIDE SERPL-SCNC: 108 MMOL/L (ref 100–108)
CHOLEST SERPL-MCNC: 186 MG/DL (ref 50–200)
CO2 SERPL-SCNC: 28 MMOL/L (ref 21–32)
CREAT SERPL-MCNC: 0.75 MG/DL (ref 0.6–1.3)
ERYTHROCYTE [DISTWIDTH] IN BLOOD BY AUTOMATED COUNT: 13 % (ref 11.6–15.1)
GFR SERPL CREATININE-BSD FRML MDRD: 96 ML/MIN/1.73SQ M
GLUCOSE P FAST SERPL-MCNC: 111 MG/DL (ref 65–99)
HCT VFR BLD AUTO: 44.7 % (ref 36.5–49.3)
HDLC SERPL-MCNC: 40 MG/DL
HGB BLD-MCNC: 15.5 G/DL (ref 12–17)
LDLC SERPL CALC-MCNC: 120 MG/DL (ref 0–100)
MCH RBC QN AUTO: 31.4 PG (ref 26.8–34.3)
MCHC RBC AUTO-ENTMCNC: 34.7 G/DL (ref 31.4–37.4)
MCV RBC AUTO: 91 FL (ref 82–98)
PLATELET # BLD AUTO: 230 THOUSANDS/UL (ref 149–390)
PMV BLD AUTO: 11.7 FL (ref 8.9–12.7)
POTASSIUM SERPL-SCNC: 3.5 MMOL/L (ref 3.5–5.3)
PROT SERPL-MCNC: 7.7 G/DL (ref 6.4–8.2)
RBC # BLD AUTO: 4.93 MILLION/UL (ref 3.88–5.62)
SODIUM SERPL-SCNC: 140 MMOL/L (ref 136–145)
TRIGL SERPL-MCNC: 129 MG/DL
WBC # BLD AUTO: 5.3 THOUSAND/UL (ref 4.31–10.16)

## 2021-04-26 PROCEDURE — 85027 COMPLETE CBC AUTOMATED: CPT

## 2021-04-26 PROCEDURE — 36415 COLL VENOUS BLD VENIPUNCTURE: CPT

## 2021-04-26 PROCEDURE — 80053 COMPREHEN METABOLIC PANEL: CPT

## 2021-04-26 PROCEDURE — 80061 LIPID PANEL: CPT

## 2021-05-19 ENCOUNTER — OFFICE VISIT (OUTPATIENT)
Dept: SLEEP CENTER | Facility: CLINIC | Age: 66
End: 2021-05-19
Payer: COMMERCIAL

## 2021-05-19 VITALS
HEART RATE: 57 BPM | WEIGHT: 286 LBS | DIASTOLIC BLOOD PRESSURE: 72 MMHG | HEIGHT: 65 IN | BODY MASS INDEX: 47.65 KG/M2 | SYSTOLIC BLOOD PRESSURE: 130 MMHG

## 2021-05-19 DIAGNOSIS — G47.33 OSA (OBSTRUCTIVE SLEEP APNEA): Primary | ICD-10-CM

## 2021-05-19 PROCEDURE — 99213 OFFICE O/P EST LOW 20 MIN: CPT | Performed by: INTERNAL MEDICINE

## 2021-05-19 NOTE — PROGRESS NOTES
Progress Note - Sleep Center   Tiara Elizondo K:[birth year] MRN: [id number]      Reason for Visit:  76 y o male here for annual follow-up    Assessment:  Doing well on current therapy of CPAP 10 cm for previously diagnosed obstructive sleep apnea  Plan:  Continue same    Follow up: One year    History of Present Illness:  History of VIRGEN on PAP therapy  Fully compliant and deriving benefit  Review of Systems      Genitourinary need to urinate more than twice a night   Cardiology none   Gastrointestinal frequent heartburn/acid reflux   Neurology none   Constitutional none   Integumentary none   Psychiatry none   Musculoskeletal none   Pulmonary none   ENT none   Endocrine frequent urination   Hematological none           I have reviewed and updated the review of systems as necessary      Historical Information    Past Medical History:   Past Medical History:   Diagnosis Date    Allergic rhinitis due to pollen     last assessed 10/01/15    Aortic aneurysm ([facility] [address] )     4 3 cm  6/2018 last check    Alvares esophagus     CPAP (continuous positive airway pressure) dependence     GERD (gastroesophageal reflux disease)     Hyperlipidemia     Hypertension     Irregular heart beat     Mild intermittent asthma without complication     last assessed 10/01/15    Positive PPD     last assesssed 12/21/15    Sleep apnea     Stroke [facility])     unsure if/when happened; no weakness    Syncopal episodes     1 year ago    Vertigo     last assessed 04/24/17         Past Surgical History:   Past Surgical History:   Procedure Laterality Date    COLONOSCOPY N/A 11/14/2018    Procedure: COLONOSCOPY;  Surgeon: Олег Xiao MD;  [facility];   Service: Colorectal    EGD AND COLONOSCOPY      HERNIA REPAIR      JOINT REPLACEMENT Left     knee    MULTIPLE TOOTH EXTRACTIONS  05/2020    TOTAL KNEE ARTHROPLASTY  08/29/2016    Dr Angela Alcaraz 07/28/16    URETHRA SURGERY      polyps removed       Social History: Social History     Socioeconomic History    Marital status: /Civil Union     Spouse name: None    Number of children: 2    Years of education: None    Highest education level: None   Occupational History    None   Social Needs    Financial resource strain: None    Food insecurity     Worry: None     Inability: None    Transportation needs     Medical: None     Non-medical: None   Tobacco Use    Smoking status: Never Smoker    Smokeless tobacco: Never Used   Substance and Sexual Activity    Alcohol use: Never     Frequency: Never    Drug use: No    Sexual activity: None   Lifestyle    Physical activity     Days per week: None     Minutes per session: None    Stress: None   Relationships    Social connections     Talks on phone: None     Gets together: None     Attends Bahai service: None     Active member of club or organization: None     Attends meetings of clubs or organizations: None     Relationship status: None    Intimate partner violence     Fear of current or ex partner: None     Emotionally abused: None     Physically abused: None     Forced sexual activity: None   Other Topics Concern    None   Social History Narrative    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[employer] History:   Family History   Problem Relation Age of Onset    Coronary artery disease Father     Hypertension Father     Heart disease Father         cardiac disorder       Medications/Allergies:      Current Outpatient Medications:     amLODIPine (NORVASC) 10 mg tablet, Take 10 mg by mouth daily, Disp: , Rfl:     aspirin 81 MG tablet, Take 81 mg by mouth , Disp: , Rfl:     calcium carbonate (TUMS) 500 mg chewable tablet, Chew 2 tablets every 4 (four) hours as needed , Disp: , Rfl:     CHOLECALCIFEROL PO, Take 2,000 Units by mouth Chew form, Disp: , Rfl:     cimetidine (TAGAMET) 200 mg tablet, Take 200 mg by mouth 4 (four) times a day as needed , Disp: , Rfl:     Coenzyme Q10 (COQ-10) 100 MG CAPS, Take 100 mg by mouth daily , Disp: , Rfl:     DENTA 5000 PLUS 1 1 % CREA, BRUSH TWICE DAILY  AFTER PM BRUSHING EXPECTORATE BUT DO NOT RINSE, Disp: , Rfl:     ezetimibe (ZETIA) 10 mg tablet, TAKE 1 TABLET BY MOUTH EVERY DAY, Disp: 90 tablet, Rfl: 3    furosemide (LASIX) 20 mg tablet, TAKE 1 TABLET (20 MG TOTAL) BY MOUTH DAILY  , Disp: 90 tablet, Rfl: 3    KLOR-CON M10 10 MEQ tablet, TAKE 2 TABLETS (20 MEQ TOTAL) BY MOUTH DAILY, Disp: 60 tablet, Rfl: 11    multivitamin (THERAGRAN) TABS, Take 1 tablet by mouth Gummy form, Disp: , Rfl:     Omega-3 Fatty Acids (FISH OIL PO), Take 1 capsule by mouth daily Gummy form, Disp: , Rfl:     sotalol (BETAPACE) 80 mg tablet, Take 80 mg by mouth 2 (two) times a day , Disp: , Rfl:     valsartan (DIOVAN) 320 MG tablet, Take 1 tablet (320 mg total) by mouth daily, Disp: 90 tablet, Rfl: 1    XARELTO 20 MG tablet, TAKE 1 TABLET BY MOUTH ONCE NDAILY WITH DINNER, Disp: 30 tablet, Rfl: 0    aluminum-magnesium hydroxide-simethicone (MYLANTA) 200-200-20 mg/5 mL suspension, Take 30 mL by mouth every 4 (four) hours as needed for indigestion or heartburn (Patient not taking: Reported on 3/30/2021), Disp: 355 mL, Rfl: 0    fluticasone (FLONASE) 50 mcg/act nasal spray, 2 sprays into each nostril daily as needed , Disp: , Rfl:           Objective      Vital Signs:   Vitals:    05/19/21 1504   BP: 130/72   Pulse: 57     [place] Sleepiness Scale: Total score: 0        Physical Exam:    General: Alert, appropriate, cooperative, overweight    Head: NC/AT    Skin: Warm, dry    Neuro: No motor abnormalities, cranial nerves appear intact    Extremity: No clubbing, cyanosis      DME Provider: [employer]        Counseling / Coordination of Care   I have spent 15 minutes with the patient today in which greater than 50% of this time was spent in counseling/coordination of care regarding: equipment and compliance                Board Certified Sleep Specialist    Portions of the record may have been created with voice recognition software  Occasional wrong word or "sound a like" substitutions may have occurred due to the inherent limitations of voice recognition software  Read the chart carefully and recognize, using context, where substitutions have occurred

## 2021-05-26 DIAGNOSIS — E87.6 HYPOKALEMIA: ICD-10-CM

## 2021-05-26 RX ORDER — POTASSIUM CHLORIDE 750 MG/1
TABLET, EXTENDED RELEASE ORAL
Qty: 60 TABLET | Refills: 11 | Status: SHIPPED | OUTPATIENT
Start: 2021-05-26 | End: 2022-04-23

## 2021-05-28 ENCOUNTER — OFFICE VISIT (OUTPATIENT)
Dept: INTERNAL MEDICINE CLINIC | Facility: CLINIC | Age: 66
End: 2021-05-28
Payer: COMMERCIAL

## 2021-05-28 VITALS
HEART RATE: 51 BPM | SYSTOLIC BLOOD PRESSURE: 120 MMHG | HEIGHT: 65 IN | DIASTOLIC BLOOD PRESSURE: 62 MMHG | OXYGEN SATURATION: 98 % | BODY MASS INDEX: 47.32 KG/M2 | WEIGHT: 284 LBS | TEMPERATURE: 97.3 F

## 2021-05-28 DIAGNOSIS — Z00.00 MEDICARE ANNUAL WELLNESS VISIT, SUBSEQUENT: Primary | ICD-10-CM

## 2021-05-28 DIAGNOSIS — I10 ESSENTIAL HYPERTENSION: ICD-10-CM

## 2021-05-28 DIAGNOSIS — K21.9 GASTROESOPHAGEAL REFLUX DISEASE WITHOUT ESOPHAGITIS: Chronic | ICD-10-CM

## 2021-05-28 PROCEDURE — 1170F FXNL STATUS ASSESSED: CPT | Performed by: NURSE PRACTITIONER

## 2021-05-28 PROCEDURE — 3078F DIAST BP <80 MM HG: CPT | Performed by: NURSE PRACTITIONER

## 2021-05-28 PROCEDURE — 1160F RVW MEDS BY RX/DR IN RCRD: CPT | Performed by: NURSE PRACTITIONER

## 2021-05-28 PROCEDURE — 3725F SCREEN DEPRESSION PERFORMED: CPT | Performed by: NURSE PRACTITIONER

## 2021-05-28 PROCEDURE — 3074F SYST BP LT 130 MM HG: CPT | Performed by: NURSE PRACTITIONER

## 2021-05-28 PROCEDURE — G0439 PPPS, SUBSEQ VISIT: HCPCS | Performed by: NURSE PRACTITIONER

## 2021-05-28 PROCEDURE — 1036F TOBACCO NON-USER: CPT | Performed by: NURSE PRACTITIONER

## 2021-05-28 PROCEDURE — 1125F AMNT PAIN NOTED PAIN PRSNT: CPT | Performed by: NURSE PRACTITIONER

## 2021-05-28 PROCEDURE — 3288F FALL RISK ASSESSMENT DOCD: CPT | Performed by: NURSE PRACTITIONER

## 2021-05-28 PROCEDURE — 3008F BODY MASS INDEX DOCD: CPT | Performed by: NURSE PRACTITIONER

## 2021-05-28 NOTE — PATIENT INSTRUCTIONS

## 2021-08-07 DIAGNOSIS — I10 ESSENTIAL HYPERTENSION: ICD-10-CM

## 2021-08-09 ENCOUNTER — OFFICE VISIT (OUTPATIENT)
Dept: INTERNAL MEDICINE CLINIC | Facility: CLINIC | Age: 66
End: 2021-08-09
Payer: COMMERCIAL

## 2021-08-09 VITALS
HEIGHT: 65 IN | WEIGHT: 288.8 LBS | BODY MASS INDEX: 48.12 KG/M2 | SYSTOLIC BLOOD PRESSURE: 138 MMHG | OXYGEN SATURATION: 96 % | DIASTOLIC BLOOD PRESSURE: 78 MMHG | HEART RATE: 57 BPM

## 2021-08-09 DIAGNOSIS — E66.01 CLASS 3 SEVERE OBESITY DUE TO EXCESS CALORIES WITHOUT SERIOUS COMORBIDITY WITH BODY MASS INDEX (BMI) OF 45.0 TO 49.9 IN ADULT (HCC): ICD-10-CM

## 2021-08-09 DIAGNOSIS — Z23 ENCOUNTER FOR IMMUNIZATION: Primary | ICD-10-CM

## 2021-08-09 DIAGNOSIS — R21 RASH: ICD-10-CM

## 2021-08-09 DIAGNOSIS — K21.9 GASTROESOPHAGEAL REFLUX DISEASE WITHOUT ESOPHAGITIS: Chronic | ICD-10-CM

## 2021-08-09 PROBLEM — E66.813 CLASS 3 SEVERE OBESITY DUE TO EXCESS CALORIES WITHOUT SERIOUS COMORBIDITY WITH BODY MASS INDEX (BMI) OF 45.0 TO 49.9 IN ADULT (HCC): Status: ACTIVE | Noted: 2021-08-09

## 2021-08-09 PROCEDURE — 99214 OFFICE O/P EST MOD 30 MIN: CPT | Performed by: GENERAL ACUTE CARE HOSPITAL

## 2021-08-09 PROCEDURE — 1036F TOBACCO NON-USER: CPT | Performed by: GENERAL ACUTE CARE HOSPITAL

## 2021-08-09 PROCEDURE — 3008F BODY MASS INDEX DOCD: CPT | Performed by: GENERAL ACUTE CARE HOSPITAL

## 2021-08-09 PROCEDURE — 1160F RVW MEDS BY RX/DR IN RCRD: CPT | Performed by: GENERAL ACUTE CARE HOSPITAL

## 2021-08-09 RX ORDER — VALSARTAN 320 MG/1
TABLET ORAL
Qty: 90 TABLET | Refills: 1 | Status: SHIPPED | OUTPATIENT
Start: 2021-08-09 | End: 2022-02-21

## 2021-08-09 NOTE — ASSESSMENT & PLAN NOTE
Refer to derm to rule out skin cancer regarding the papule on right shin  Other smaller papules seems folliculitis to me  Topical abx as needed  Refer to derm for evaluation

## 2021-08-09 NOTE — ASSESSMENT & PLAN NOTE
Had a lengthy discussion about diet, exercise, weight loss meds, bariatric surgery     Refer to weight management program

## 2021-08-09 NOTE — PROGRESS NOTES
Assessment/Plan:    Gastroesophageal reflux disease without esophagitis/Haider's esophagus  Continue cimetidine and tums  F/u with GI  Counseling provided on life style modification, diet, weight loss, bed elevation  Rash  Refer to derm to rule out skin cancer regarding the papule on right shin  Other smaller papules seems folliculitis to me  Topical abx as needed  Refer to derm for evaluation  Class 3 severe obesity due to excess calories without serious comorbidity with body mass index (BMI) of 45 0 to 49 9 in [facility])  Had a lengthy discussion about diet, exercise, weight loss meds, bariatric surgery  Refer to weight management program         Diagnoses and all orders for this visit:    Encounter for immunization    Rash  -     Cancel: Ambulatory referral to Dermatology; Future  -     Ambulatory referral to Dermatology; Future    Class 3 severe obesity due to excess calories without serious comorbidity with body mass index (BMI) of 45 0 to 49 9 in [facility])  -     Ambulatory referral to Weight Management; Future    Gastroesophageal reflux disease without esophagitis/Haider's esophagus    Other orders  -     Cancel: PNEUMOCOCCAL POLYSACCHARIDE VACCINE 23-VALENT =>3YO SQ IM          Subjective:      Patient ID: Nathalie Beauchamp is a 78 y o  male  HPI    Noticed a rash over right shin  For two years, elevated, gets itchy, noticed new spots around it  Never goes away  Also noticed different kinds of smaller rashes over abdomen and on legs  New rash seems to be filled with pus but when he pops it it drains clear liquid  GERD with haider's esophagus  Thinks empty stomach makes symptoms worse so also eat prior to bedtime to prevent empty stomach  On tagamet and tums  The following portions of the patient's history were reviewed and updated as appropriate: allergies, past family history, past social history and past surgical history      Review of Systems      Objective:      BP 138/78   Pulse 57   Ht 5' 5" (1 651 m)   Wt 131 kg (288 lb 12 8 oz)   SpO2 96%   BMI 48 06 kg/m²          Physical Exam

## 2021-08-09 NOTE — TELEPHONE ENCOUNTER
Requested medication(s) are due for refill today: Yes  Patient has already received a courtesy refill: Yes  Other reason request has been forwarded to provider: Flagging for ALLERGY contraindication  Please advise

## 2021-08-09 NOTE — ASSESSMENT & PLAN NOTE
Continue cimetidine and tums  F/u with GI  Counseling provided on life style modification, diet, weight loss, bed elevation

## 2021-09-13 ENCOUNTER — TELEMEDICINE (OUTPATIENT)
Dept: INTERNAL MEDICINE CLINIC | Facility: CLINIC | Age: 66
End: 2021-09-13
Payer: COMMERCIAL

## 2021-09-13 DIAGNOSIS — R68.89 FLU-LIKE SYMPTOMS: Primary | ICD-10-CM

## 2021-09-13 PROCEDURE — 87635 SARS-COV-2 COVID-19 AMP PRB: CPT | Performed by: FAMILY MEDICINE

## 2021-09-13 PROCEDURE — 99212 OFFICE O/P EST SF 10 MIN: CPT | Performed by: GENERAL ACUTE CARE HOSPITAL

## 2021-09-13 NOTE — PROGRESS NOTES
COVID-19 Outpatient Progress Note    Assessment/Plan:    Problem List Items Addressed This Visit     None      Visit Diagnoses     Flu-like symptoms    -  Primary    Relevant Orders    Novel Coronavirus (Covid-19),PCR [facility] - Collected at Mobile Vans or [facility]         Disposition:     I referred patient to one of our centralized sites for a COVID-19 swab  I have spent 15 minutes directly with the patient  Verification of patient location:    Patient is located in the following state in which I hold an active license [place]    Encounter provider Yessy Zendejas MD    Provider located at [address] [place] [id number]    Recent Visits  No visits were found meeting these conditions  Showing recent visits within past 7 days and meeting all other requirements  Today's Visits  Date Type Provider Dept   09/13/21 Telemedicine Yessy Zendejas MD [address] [facility] today's visits and meeting all other requirements  Future Appointments  No visits were found meeting these conditions  Showing future appointments within next 150 days and meeting all other requirements     This virtual check-in was done via [employer] and patient was informed that this is a secure, HIPAA-compliant platform  He agrees to proceed  Patient agrees to participate in a virtual check in via telephone or video visit instead of presenting to the office to address urgent/immediate medical needs  Patient is aware this is a billable service  After connecting through [facility], the patient was identified by name and date of birth  Kendra Echeverria was informed that this was a telemedicine visit and that the exam was being conducted confidentially over secure lines  Kendra Echeverria acknowledged consent and understanding of privacy and security of the telemedicine visit   I informed the patient that I have reviewed his record in Epic and presented the opportunity for him to ask any questions regarding the visit today  The patient agreed to participate  Subjective:   Juancho Segura is a 78 y o  male who is concerned about COVID-19  Patient's symptoms include sore throat and cough  Date of symptom onset: 9/12/2021  COVID-19 vaccination status: Fully vaccinated    No results found for: Ketty Castillo, [address] [facility], [address] [facility], [place] [address]  Past Medical History:   Diagnosis Date    Allergic rhinitis due to pollen     last assessed 10/01/15    Aortic aneurysm ([address] )     4 3 cm  6/2018 last check    Alvares esophagus     CPAP (continuous positive airway pressure) dependence     GERD (gastroesophageal reflux disease)     Hyperlipidemia     Hypertension     Irregular heart beat     Mild intermittent asthma without complication     last assessed 10/01/15    Positive PPD     last assesssed 12/21/15    Sleep apnea     Stroke [facility])     unsure if/when happened; no weakness    Syncopal episodes     1 year ago    Vertigo     last assessed 04/24/17     Past Surgical History:   Procedure Laterality Date    COLONOSCOPY N/A 11/14/2018    Procedure: COLONOSCOPY;  Surgeon: Andrew Bautista MD;  [facility]; Service: Colorectal    EGD AND COLONOSCOPY      HERNIA REPAIR      JOINT REPLACEMENT Left     knee    MULTIPLE TOOTH EXTRACTIONS  05/2020    TOTAL KNEE ARTHROPLASTY  08/29/2016    Dr Radames Riojas 07/28/16    URETHRA SURGERY      polyps removed     Current Outpatient Medications   Medication Sig Dispense Refill    amLODIPine (NORVASC) 10 mg tablet Take 10 mg by mouth daily      aspirin 81 MG tablet Take 81 mg by mouth       calcium carbonate (TUMS) 500 mg chewable tablet Chew 2 tablets every 4 (four) hours as needed       CHOLECALCIFEROL PO Take 2,000 Units by mouth Chew form      cimetidine (TAGAMET) 200 mg tablet Take 200 mg by mouth daily      Coenzyme Q10 (COQ-10) 100 MG CAPS Take 100 mg by mouth daily       DENTA 5000 PLUS 1 1 % CREA BRUSH TWICE DAILY   AFTER PM BRUSHING EXPECTORATE BUT DO NOT RINSE      ezetimibe (ZETIA) 10 mg tablet TAKE 1 TABLET BY MOUTH EVERY DAY 90 tablet 3    fluticasone (FLONASE) 50 mcg/act nasal spray 2 sprays into each nostril daily as needed  (Patient not taking: Reported on 8/9/2021)      furosemide (LASIX) 20 mg tablet TAKE 1 TABLET (20 MG TOTAL) BY MOUTH DAILY  90 tablet 3    Klor-Con M10 10 MEQ tablet TAKE 2 TABLETS BY MOUTH DAILY 60 tablet 11    multivitamin (THERAGRAN) TABS Take 1 tablet by mouth Gummy form      Omega-3 Fatty Acids (FISH OIL PO) Take 1 capsule by mouth daily Gummy form      sotalol (BETAPACE) 80 mg tablet Take 80 mg by mouth 2 (two) times a day       valsartan (DIOVAN) 320 MG tablet TAKE 1 TABLET BY MOUTH EVERY DAY 90 tablet 1    XARELTO 20 MG tablet TAKE 1 TABLET BY MOUTH ONCE NDAILY WITH DINNER 30 tablet 0     No current facility-administered medications for this visit  Allergies   Allergen Reactions    Iodine - Food Allergy Shortness Of Breath     Other reaction(s): Respiratory Distress  Other reaction(s): Respiratory Distress    Omnipaque [Iohexol] Anaphylaxis    Erythromycin Hives     Other reaction(s): Other (See Comments)  Pleurisy   Pleurisy   Other reaction(s): Other (See Comments)  Pleurisy     Iodixanol     Lisinopril Cough    Statins Myalgia       Review of Systems   HENT: Positive for sore throat  Respiratory: Positive for cough  Objective: There were no vitals filed for this visit  Physical Exam    VIRTUAL VISIT [address] verbally agrees to participate in [facility]  Pt is aware that [facility] could be limited without vital signs or the ability to perform a full hands-on physical Sujatha Gilliam understands he or the provider may request at any time to terminate the video visit and request the patient to seek care or treatment in person

## 2021-09-14 ENCOUNTER — TELEPHONE (OUTPATIENT)
Dept: INTERNAL MEDICINE CLINIC | Facility: CLINIC | Age: 66
End: 2021-09-14

## 2021-09-14 NOTE — TELEPHONE ENCOUNTER
Patient had a virtual yesterday with Dr Autumn Orourke for sinus issues  He went for a Covid swab, which was negative  He is still having sinus issues  Would he be able to get a steroid pack to help make him feel better? Please advise

## 2021-09-14 NOTE — PROGRESS NOTES
Assessment/Plan:  -Discussed options of HealthyCORE-Intensive Lifestyle Intervention Program, Very Low Calorie Diet-VLCD, Conservative Program, Isabela-En-Y Gastric Bypass and Vertical Sleeve Gastrectomy and the role of weight loss medications   -Initial weight loss goal of 5-10% weight loss for improved health  -Avoid phentermine: AAA and hx of stroke  -Screening labs: CMP and LP reviewed from 4/26/21; Check TSH and A1c  -Calorie goals, sample menu, portion size guidelines, and food logging reviewed with the patient    -Does not do well with meal preparation, prefers not to cook  -Plans to retry nutrisystem, but may consider RYGB    HTN (hypertension)  HTN/afib  -taking valsartan, sotalol, furosemide, and amlodipine  -on xarelto  -may improve with weight loss and dietary changes    Hyperlipidemia  -on statin  -lifestyle changes/weight loss    VIRGEN on CPAP  -encouraged continued use of CPAP machine    Follow up: 3 hour eval    Goals:  Food log (ie ) www myfitnesspal com,sparkpeople  com,loseit com,calorieking  com,etc  baritastic  No sugary beverages  At least 64oz of water daily  Increase physical activity by 10 minutes daily   Gradually increase physical activity to a goal of 5 days per week for 30 minutes of MODERATE intensity PLUS 2 days per week of FULL BODY resistance training  5-10 servings of fruits and vegetables per day and 25-35 grams of dietary fiber per day, gradually increasing  Measure all portions: creamers, sugars, cooking oils/butters, condiments, dressings, etc and log calories   If choosing starch pick whole grain/complex carbs and keep to 1/2 cup: oatmeal, brown rice, quinoa, whole wheat pasta, potatoes, sweet potato, chickpea noodles, red lentil noodles  Recommend checking lab coverage before having labs drawn  7551-7083 calories    Diagnoses and all orders for this visit:    Abnormal weight gain  -     Ambulatory referral to Weight Management  -     Hemoglobin A1C; Future  -     TSH, 3rd generation with Free T4 reflex; Future    Prediabetes  -     Hemoglobin A1C; Future  -     TSH, 3rd generation with Free T4 reflex; Future    Essential hypertension  -     Hemoglobin A1C; Future  -     TSH, 3rd generation with Free T4 reflex; Future    Atypical atrial flutter (HCC)  -     Hemoglobin A1C; Future  -     TSH, 3rd generation with Free T4 reflex; Future    Other hyperlipidemia  -     Hemoglobin A1C; Future  -     TSH, 3rd generation with Free T4 reflex; Future    Adult BMI 45 0-49 9 kg/sq m (HCC)  -     Hemoglobin A1C; Future  -     TSH, 3rd generation with Free T4 reflex; Future    VIRGEN on CPAP  -     Hemoglobin A1C; Future  -     TSH, 3rd generation with Free T4 reflex; Future    Thoracic aortic aneurysm without rupture [facility])        Subjective:   Chief Complaint   Patient presents with    Consult     MWM consult      Patient ID: Krystyna Bartlett  is a 78 y o  male with excess weight/obesity here to pursue weight management  Past Medical History:   Diagnosis Date    Allergic rhinitis due to pollen     last assessed 10/01/15    Aortic aneurysm (HCC)     4 3 cm  6/2018 last check    Alvares esophagus     CPAP (continuous positive airway pressure) dependence     GERD (gastroesophageal reflux disease)     Hyperlipidemia     Hypertension     Irregular heart beat     Mild intermittent asthma without complication     last assessed 10/01/15    Positive PPD     last assesssed 12/21/15    Sleep apnea     Stroke [facility])     unsure if/when happened; no weakness    Syncopal episodes     1 year ago    Vertigo     last assessed 04/24/17     HPI:  Obesity/Excess Weight:  Severity: Severe  Onset:  10+ years since senior living, [employer]   Modifiers: [employer] [facility] Loss Programs-ie   Weight Watchers, Bernardine Leopold, Nutrisystem, etc   Contributing factors: poorly controlled reflux- attributes to eating carbs (bagel, fig newtons) to control sx  Associated symptoms: comorbid conditions    Goals: 210, lose weight for cardiac ablation  Hydration: 2-3 bottles of water  Alcohol: denies  Swims 20 laps 3x/week  Retired, but driving bus    The following portions of the patient's history were reviewed and updated as appropriate: allergies, current medications, past family history, past medical history, past social history, past surgical history and problem list     Review of Systems   Cardiovascular: Positive for chest pain (unable to diferentiate CP vs GERD sx; sees cardiology)  Negative for palpitations  Psychiatric/Behavioral: Negative  Objective:    /70 (BP Location: Right arm, Patient Position: Sitting, Cuff Size: Adult)   Pulse 61   Temp 99 2 °F (37 3 °C) (Tympanic)   Resp 18   Ht 5' 3 8" (1 621 m)   Wt 130 kg (287 lb 11 2 oz)   BMI 49 69 kg/m²     Physical Exam  Vitals and nursing note reviewed  Constitutional   General appearance: Abnormal   well developed and morbidly obese  Pulmonary   Respiratory effort: No increased work of breathing or signs of respiratory distress  Abdomen   Abdomen: Abnormal   The abdomen was obese    Musculoskeletal   Gait and station: Normal     Psychiatric   Orientation to person, place and time: Normal     Affect: appropriate

## 2021-09-15 ENCOUNTER — CONSULT (OUTPATIENT)
Dept: BARIATRICS | Facility: CLINIC | Age: 66
End: 2021-09-15
Payer: COMMERCIAL

## 2021-09-15 VITALS
HEIGHT: 64 IN | DIASTOLIC BLOOD PRESSURE: 70 MMHG | RESPIRATION RATE: 18 BRPM | WEIGHT: 287.7 LBS | SYSTOLIC BLOOD PRESSURE: 152 MMHG | BODY MASS INDEX: 49.12 KG/M2 | TEMPERATURE: 99.2 F | HEART RATE: 61 BPM

## 2021-09-15 DIAGNOSIS — I10 ESSENTIAL HYPERTENSION: ICD-10-CM

## 2021-09-15 DIAGNOSIS — G47.33 OSA ON CPAP: ICD-10-CM

## 2021-09-15 DIAGNOSIS — I48.4 ATYPICAL ATRIAL FLUTTER (HCC): ICD-10-CM

## 2021-09-15 DIAGNOSIS — Z99.89 OSA ON CPAP: ICD-10-CM

## 2021-09-15 DIAGNOSIS — E78.49 OTHER HYPERLIPIDEMIA: ICD-10-CM

## 2021-09-15 DIAGNOSIS — I71.2 THORACIC AORTIC ANEURYSM WITHOUT RUPTURE (HCC): ICD-10-CM

## 2021-09-15 DIAGNOSIS — R63.5 ABNORMAL WEIGHT GAIN: Primary | ICD-10-CM

## 2021-09-15 DIAGNOSIS — R73.03 PREDIABETES: ICD-10-CM

## 2021-09-15 PROCEDURE — 99204 OFFICE O/P NEW MOD 45 MIN: CPT | Performed by: PHYSICIAN ASSISTANT

## 2021-09-15 NOTE — ASSESSMENT & PLAN NOTE
HTN/afib  -taking valsartan, sotalol, furosemide, and amlodipine  -on xarelto  -may improve with weight loss and dietary changes

## 2021-09-15 NOTE — PATIENT INSTRUCTIONS
After reviewing 's recommendation. Please request pulmonology's notes, hematology's notes if she ever saw one outpatient, In patient results if not. We can decide from there what we do. From my memory I do not believe they found any cause of why she had a PE which leads me to believe we could likely bridge with Lovenox but will wait to determine once records are received.  -SR Goals: Food log (ie ) www myfitnesspal com,sparkpeople  com,loseit com,calorieking  com,etc  baritastic  No sugary beverages  At least 64oz of water daily  Increase physical activity by 10 minutes daily   Gradually increase physical activity to a goal of 5 days per week for 30 minutes of MODERATE intensity PLUS 2 days per week of FULL BODY resistance training  5-10 servings of fruits and vegetables per day and 25-35 grams of dietary fiber per day, gradually increasing  Measure all portions: creamers, sugars, cooking oils/butters, condiments, dressings, etc and log calories   If choosing starch pick whole grain/complex carbs and keep to 1/2 cup: oatmeal, brown rice, quinoa, whole wheat pasta, potatoes, sweet potato, chickpea noodles, red lentil noodles  Recommend checking lab coverage before having labs drawn  1473-7630 calories

## 2021-09-15 NOTE — TELEPHONE ENCOUNTER
Steroid not indicated  Can consider abx  Please schedule a visit with Nisha or Dr Asia Shabazz or resident in the next 2 days

## 2021-09-16 ENCOUNTER — OFFICE VISIT (OUTPATIENT)
Dept: INTERNAL MEDICINE CLINIC | Facility: CLINIC | Age: 66
End: 2021-09-16
Payer: COMMERCIAL

## 2021-09-16 VITALS
DIASTOLIC BLOOD PRESSURE: 70 MMHG | HEART RATE: 66 BPM | OXYGEN SATURATION: 95 % | WEIGHT: 287 LBS | BODY MASS INDEX: 49 KG/M2 | SYSTOLIC BLOOD PRESSURE: 138 MMHG | TEMPERATURE: 97.5 F | HEIGHT: 64 IN

## 2021-09-16 DIAGNOSIS — J01.00 ACUTE NON-RECURRENT MAXILLARY SINUSITIS: Primary | ICD-10-CM

## 2021-09-16 PROCEDURE — 3075F SYST BP GE 130 - 139MM HG: CPT | Performed by: NURSE PRACTITIONER

## 2021-09-16 PROCEDURE — 3078F DIAST BP <80 MM HG: CPT | Performed by: NURSE PRACTITIONER

## 2021-09-16 PROCEDURE — 99213 OFFICE O/P EST LOW 20 MIN: CPT | Performed by: NURSE PRACTITIONER

## 2021-09-16 RX ORDER — AZITHROMYCIN 250 MG/1
TABLET, FILM COATED ORAL
Qty: 6 TABLET | Refills: 0 | Status: SHIPPED | OUTPATIENT
Start: 2021-09-16 | End: 2021-09-17

## 2021-09-16 RX ORDER — METHYLPREDNISOLONE 4 MG/1
TABLET ORAL
Qty: 21 EACH | Refills: 0 | Status: SHIPPED | OUTPATIENT
Start: 2021-09-16 | End: 2022-03-22 | Stop reason: SDUPTHER

## 2021-09-16 NOTE — PROGRESS NOTES
Assessment/Plan:    Acute maxillary sinusitis  Start prednisone and antibiotics  Fluids and rest       Diagnoses and all orders for this visit:    Acute non-recurrent maxillary sinusitis  -     methylPREDNISolone 4 MG tablet therapy pack; Use as directed on package  -     Discontinue: azithromycin (ZITHROMAX) 250 mg tablet; Take 2 tablets today then 1 tablet daily x 4 days  -     doxycycline hyclate (VIBRAMYCIN) 100 mg capsule; Take 1 capsule (100 mg total) by mouth every 12 (twelve) hours for 7 days          Subjective:      Patient ID: Dirk Castleman is a 78 y o  male  Patient is here for sinus congestion, pressure, headache since Saturday  Negative covid19  No fever no cough        The following portions of the patient's history were reviewed and updated as appropriate: allergies, current medications, past family history, past medical history, past social history, past surgical history and problem list     Review of Systems   Constitutional: Negative  HENT: Positive for congestion, postnasal drip, rhinorrhea, sinus pressure and sinus pain  Eyes: Negative  Respiratory: Negative  Cardiovascular: Negative  Gastrointestinal: Negative  Musculoskeletal: Negative  Neurological: Positive for headaches  Objective:      /70   Pulse 66   Temp 97 5 °F (36 4 °C) (Temporal)   Ht 5' 3 8" (1 621 m)   Wt 130 kg (287 lb)   SpO2 95%   BMI 49 57 kg/m²          Physical Exam  Vitals and nursing note reviewed  Constitutional:       Appearance: Normal appearance  He is well-developed  HENT:      Head: Normocephalic and atraumatic  Right Ear: External ear normal       Left Ear: External ear normal       Nose: Nose normal    Eyes:      Conjunctiva/sclera: Conjunctivae normal       Pupils: Pupils are equal, round, and reactive to light  Cardiovascular:      Rate and Rhythm: Normal rate and regular rhythm     Pulmonary:      Effort: Pulmonary effort is normal       Breath sounds: Normal breath sounds  Musculoskeletal:         General: Normal range of motion  Cervical back: Normal range of motion and neck supple  Skin:     General: Skin is warm and dry  Neurological:      Mental Status: He is alert and oriented to person, place, and time

## 2021-09-17 RX ORDER — DOXYCYCLINE HYCLATE 100 MG/1
100 CAPSULE ORAL EVERY 12 HOURS SCHEDULED
Qty: 14 CAPSULE | Refills: 0 | Status: SHIPPED | OUTPATIENT
Start: 2021-09-17 | End: 2021-09-24

## 2021-09-24 ENCOUNTER — TELEPHONE (OUTPATIENT)
Dept: SLEEP CENTER | Facility: CLINIC | Age: 66
End: 2021-09-24

## 2021-09-24 ENCOUNTER — OFFICE VISIT (OUTPATIENT)
Dept: GASTROENTEROLOGY | Facility: CLINIC | Age: 66
End: 2021-09-24
Payer: COMMERCIAL

## 2021-09-24 VITALS
HEART RATE: 49 BPM | WEIGHT: 278 LBS | HEIGHT: 64 IN | SYSTOLIC BLOOD PRESSURE: 112 MMHG | BODY MASS INDEX: 47.46 KG/M2 | DIASTOLIC BLOOD PRESSURE: 60 MMHG

## 2021-09-24 DIAGNOSIS — K22.710 BARRETT'S ESOPHAGUS WITH LOW GRADE DYSPLASIA: Primary | ICD-10-CM

## 2021-09-24 DIAGNOSIS — K64.8 OTHER HEMORRHOIDS: ICD-10-CM

## 2021-09-24 DIAGNOSIS — K62.5 RECTAL BLEEDING: ICD-10-CM

## 2021-09-24 DIAGNOSIS — K31.89 GASTRIC NODULE: ICD-10-CM

## 2021-09-24 DIAGNOSIS — K21.9 GASTROESOPHAGEAL REFLUX DISEASE WITHOUT ESOPHAGITIS: Chronic | ICD-10-CM

## 2021-09-24 DIAGNOSIS — R13.19 OTHER DYSPHAGIA: ICD-10-CM

## 2021-09-24 PROCEDURE — 99214 OFFICE O/P EST MOD 30 MIN: CPT | Performed by: PHYSICIAN ASSISTANT

## 2021-09-24 PROCEDURE — 1160F RVW MEDS BY RX/DR IN RCRD: CPT | Performed by: PHYSICIAN ASSISTANT

## 2021-09-24 PROCEDURE — 1036F TOBACCO NON-USER: CPT | Performed by: PHYSICIAN ASSISTANT

## 2021-09-24 PROCEDURE — 3008F BODY MASS INDEX DOCD: CPT | Performed by: PHYSICIAN ASSISTANT

## 2021-09-24 RX ORDER — OMEPRAZOLE 40 MG/1
40 CAPSULE, DELAYED RELEASE ORAL DAILY
Qty: 30 CAPSULE | Refills: 2 | Status: SHIPPED | OUTPATIENT
Start: 2021-09-24 | End: 2021-09-24

## 2021-09-24 RX ORDER — PANTOPRAZOLE SODIUM 40 MG/1
40 TABLET, DELAYED RELEASE ORAL DAILY
Qty: 30 TABLET | Refills: 2 | Status: SHIPPED | OUTPATIENT
Start: 2021-09-24 | End: 2021-10-13 | Stop reason: SDUPTHER

## 2021-09-24 NOTE — TELEPHONE ENCOUNTER
9/24 Patient came into office for a DME script  Patient stated that he was at [employer] and they told him that insurance okayed for him to get a new machine but he needs a script  Patient would like to know if Dr Knutson would write a script for a new machine  Would like a call back

## 2021-09-24 NOTE — PATIENT INSTRUCTIONS
Egd on 12/15/21 with Dr Abraham Lilly at [address] [facility] instructions given by Tahir Petersen in the office  Medication clearance faxed to Dr Blue Kim at [phone]

## 2021-09-24 NOTE — PROGRESS NOTES
[facility] - Outpatient Follow-up Note  Jeffrey Kaur 78 y o  male MRN: [id number]  Encounter: [id number]      Assessment and Plan    1  Uncontrolled GERD  2  History of Haider's esophagus with dysplasia status post RFA 8/26/2019  3  Dysphagia  4  Stomach nodule    the patient presents with uncontrolled acid reflux that occurs daily and wakes him up in the middle the night  Currently he is on Tagamet but no PPI therapy  The patient does have a history of haider's with low-grade dysplasia and underwent EGD with RFA 8/26/19  EGD at that time also revealed a small submucosal nodule in the antrum for which he underwent EUS 1/23/2020 revealing a 1 cm hyperechoic area seen arising from the submucosa with no high risk features seen  Esophageal biopsies were taken and negative for haider's  Repeat EGD was recommended in January of 2021   - continue Tagamet  - start pantoprazole 40mg daily   - diet/lifestyle modification for GERD prevention  - EGD secondary to his history and current dysphagia    5  History of colon polyps  Last colonoscopy was in 2018 with Dr Barbara Nielsen, 2 polyps removed (1 TA and 1 hyperplastic) and the patient was asked to repeat a colonoscopy in 5 years  Currently the patient states that he does occasionally see blood coating his stool when he has harder stools but he believes this is secondary to known hemorrhoids  He denies any hematochezia  - agree with the patient's bleeding sounds consistent with hemorrhoids, proceed with repeat colonoscopy in 2023 or sooner if clinically indicated  - call with significant bleeding    FU after EGD    ______________________________________________________________________    History of Present Illness  Jeffrey Kaur is a 78 y o  male here for follow up evaluation of Haider's esophagus and acid reflux  The patient has a Haider's with low-grade dysplasia and underwent EGD with radiofrequency ablation 8/26/19    EGD at that time also revealed a small submucosal nodule in the antrum for which he underwent endoscopic ultrasound 1/23/2020 revealing a 1 cm hyperechoic area seen arising from the submucosa with no high risk features seen  Repeat EGD was recommended in January of 2021  The patient presents today and he does have significant acid reflux daily, it also wake him up at night  At this time he is only taking Tagamet  He is currently not on PPI therapy  The patient denies any odynophagia or melena but he does have current dysphagia  This is mainly with pills  Review of Systems   Constitutional: Negative for activity change, appetite change, chills, fatigue, fever and unexpected weight change  HENT: Negative for sore throat and trouble swallowing  Gastrointestinal: Negative for abdominal distention, abdominal pain, anal bleeding, blood in stool, constipation, diarrhea, nausea, rectal pain and vomiting  Musculoskeletal: Negative for back pain and gait problem  Neurological: Negative for syncope  Psychiatric/Behavioral: Negative for confusion  Past Medical History  Past Medical History:   Diagnosis Date    Allergic rhinitis due to pollen     last assessed 10/01/15    Aortic aneurysm (HCC)     4 3 cm  6/2018 last check    Alvares esophagus     CPAP (continuous positive airway pressure) dependence     GERD (gastroesophageal reflux disease)     Hyperlipidemia     Hypertension     Irregular heart beat     Mild intermittent asthma without complication     last assessed 10/01/15    Positive PPD     last assesssed 12/21/15    Sleep apnea     Stroke [facility])     unsure if/when happened; no weakness    Syncopal episodes     1 year ago    Vertigo     last assessed 04/24/17       Past Social history  Past Surgical History:   Procedure Laterality Date    COLONOSCOPY N/A 11/14/2018    Procedure: COLONOSCOPY;  Surgeon: Mary Brandon MD;  [facility];   Service: Colorectal    EGD AND COLONOSCOPY      ENDOSCOPIC ULTRASOUND (LOWER)  01/23/2020    HERNIA REPAIR      JOINT REPLACEMENT Left     knee    MULTIPLE TOOTH EXTRACTIONS  05/2020    TOTAL KNEE ARTHROPLASTY  08/29/2016    Dr Niya Ramey 07/28/16    UPPER GASTROINTESTINAL ENDOSCOPY  10/21/2019    URETHRA SURGERY      polyps removed     Social History     Socioeconomic History    Marital status: /Civil Union     Spouse name: Not on file    Number of children: 2    Years of education: Not on file    Highest education level: Not on file   Occupational History    Not on file   Tobacco Use    Smoking status: Never Smoker    Smokeless tobacco: Never Used   Vaping Use    Vaping Use: Never used   Substance and Sexual Activity    Alcohol use: Never    Drug use: No    Sexual activity: Yes   Other Topics Concern    Not on file   Social History Narrative    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[address]:     Difficulty of Paying Living Expenses:    Food Insecurity:     Worried About [address] in the Last Year:    [address] [employer] in the Last Year:    Transportation Needs:     Lack of Transportation (Medical):      Lack of Transportation (Non-Medical):    Physical Activity:     Days of Exercise per Week:     Minutes of Exercise per Session:    Stress:     Feeling of Stress :    Social Connections:     Frequency of Communication with Friends and Family:     Frequency of Social Gatherings with Friends and Family:     Attends Buddhist Services:     Active Member of Clubs or Organizations:     Attends Club or Organization Meetings:     Marital Status:    Intimate Partner Violence:     Fear of Current or Ex-Partner:     Emotionally Abused:     Physically Abused:     Sexually Abused:      Social History     Substance and Sexual Activity   Alcohol Use Never     Social History     Substance and Sexual Activity   Drug Use No     Social History     Tobacco Use   Smoking Status Never Smoker   Smokeless Tobacco Never Used       Past Family History  Family History   Problem Relation Age of Onset    Coronary artery disease Father     Hypertension Father     Heart disease Father         cardiac disorder    Stomach cancer Maternal Grandmother     Thyroid disease Neg Hx        Current Medications  Current Outpatient Medications   Medication Sig Dispense Refill    amLODIPine (NORVASC) 10 mg tablet Take 10 mg by mouth daily      aspirin 81 MG tablet Take 81 mg by mouth       calcium carbonate (TUMS) 500 mg chewable tablet Chew 2 tablets every 4 (four) hours as needed       CHOLECALCIFEROL PO Take 2,000 Units by mouth Chew form      cimetidine (TAGAMET) 200 mg tablet Take 200 mg by mouth daily      Coenzyme Q10 (COQ-10) 100 MG CAPS Take 100 mg by mouth daily       DENTA 5000 PLUS 1 1 % CREA BRUSH TWICE DAILY  AFTER PM BRUSHING EXPECTORATE BUT DO NOT RINSE      ezetimibe (ZETIA) 10 mg tablet TAKE 1 TABLET BY MOUTH EVERY DAY 90 tablet 3    fluticasone (FLONASE) 50 mcg/act nasal spray 2 sprays into each nostril daily as needed       furosemide (LASIX) 20 mg tablet TAKE 1 TABLET (20 MG TOTAL) BY MOUTH DAILY   90 tablet 3    Klor-Con M10 10 MEQ tablet TAKE 2 TABLETS BY MOUTH DAILY 60 tablet 11    multivitamin (THERAGRAN) TABS Take 1 tablet by mouth Gummy form      Omega-3 Fatty Acids (FISH OIL PO) Take 1 capsule by mouth daily Gummy form      sotalol (BETAPACE) 80 mg tablet Take 80 mg by mouth 2 (two) times a day       valsartan (DIOVAN) 320 MG tablet TAKE 1 TABLET BY MOUTH EVERY DAY 90 tablet 1    XARELTO 20 MG tablet TAKE 1 TABLET BY MOUTH ONCE NDAILY WITH DINNER 30 tablet 0    doxycycline hyclate (VIBRAMYCIN) 100 mg capsule Take 1 capsule (100 mg total) by mouth every 12 (twelve) hours for 7 days (Patient not taking: Reported on 9/24/2021) 14 capsule 0    methylPREDNISolone 4 MG tablet therapy pack Use as directed on package (Patient not taking: Reported on 9/24/2021) 21 each 0 No current facility-administered medications for this visit  Allergies  Allergies   Allergen Reactions    Iodine - Food Allergy Shortness Of Breath     Other reaction(s): Respiratory Distress  Other reaction(s): Respiratory Distress    Omnipaque [Iohexol] Anaphylaxis    Erythromycin Hives     Other reaction(s): Other (See Comments)  Pleurisy   Pleurisy   Other reaction(s): Other (See Comments)  Pleurisy     Iodixanol     Lisinopril Cough    Statins Myalgia         The following portions of the patient's history were reviewed and updated as appropriate: allergies, current medications, past medical history, past social history, past surgical history and problem list       Vitals  Vitals:    09/24/21 1451   BP: 112/60   Pulse: (!) 49   Weight: 126 kg (278 lb)   Height: 5' 3 8" (1 621 m)         Physical Exam  Constitutional   General appearance: Patient is seated and in no acute distress, well appearing and well nourished  Head and Face   Head and face: Normal     Eyes   Conjunctiva and lids: No erythema, swelling or discharge  Anicteric  Ears, Nose, Mouth, and Throat   Hearing: Normal     Neck: Supple, trachea midline  Pulmonary   Respiratory effort: No increased work of breathing or signs of respiratory distress  Lungs: Clear to ascultation, no wheezes, rhonchi, or rales  Cardiovascular   Heart: Regular rate and rhythm, no murmurs gallops or rubs   Examination of extremities for edema and/or varicosities: Normal     Abdomen   Abdomen: Soft, non-tender, no masses, no organomegaly  Normal bowel sounds  Musculoskeletal   Gait and station: Normal     Skin   Skin and subcutaneous tissue: Warm, dry, and intact  No visible jaundice, lesions or rashes  Psychiatric   Judgment and insight: Normal  Recent and remote memory:  Normal  Mood and affect: Normal      Results  No visits with results within 1 Day(s) from this visit     Latest known visit with results is:   Orders Only on 09/13/2021 Component Date Value    SARS-CoV-2 09/13/2021 Negative        Radiology Results  No results found  Orders  No orders of the defined types were placed in this encounter

## 2021-10-11 DIAGNOSIS — G47.33 OSA (OBSTRUCTIVE SLEEP APNEA): Primary | ICD-10-CM

## 2021-10-11 NOTE — TELEPHONE ENCOUNTER
Patient wears full face mask  He is asking for a RX for a replacement machine     RX to be e mailed to patient    Last seen 5/19/2021  Dr Ashli Myers please write Lawerance Certain

## 2021-10-12 DIAGNOSIS — K22.710 BARRETT'S ESOPHAGUS WITH LOW GRADE DYSPLASIA: ICD-10-CM

## 2021-10-12 NOTE — TELEPHONE ENCOUNTER
Patient's daughter called, following up on script  Advised script written yesterday, will email as requested  Emailed to [email or web address]

## 2021-10-13 RX ORDER — PANTOPRAZOLE SODIUM 40 MG/1
40 TABLET, DELAYED RELEASE ORAL
Qty: 60 TABLET | Refills: 2 | Status: SHIPPED | OUTPATIENT
Start: 2021-10-13 | End: 2021-11-10

## 2021-12-06 ENCOUNTER — TELEPHONE (OUTPATIENT)
Dept: GASTROENTEROLOGY | Facility: CLINIC | Age: 66
End: 2021-12-06

## 2021-12-14 ENCOUNTER — TELEPHONE (OUTPATIENT)
Dept: GASTROENTEROLOGY | Facility: HOSPITAL | Age: 66
End: 2021-12-14

## 2021-12-15 ENCOUNTER — ANESTHESIA (OUTPATIENT)
Dept: GASTROENTEROLOGY | Facility: HOSPITAL | Age: 66
End: 2021-12-15

## 2021-12-15 ENCOUNTER — HOSPITAL ENCOUNTER (OUTPATIENT)
Dept: GASTROENTEROLOGY | Facility: HOSPITAL | Age: 66
Setting detail: OUTPATIENT SURGERY
Discharge: HOME | End: 2021-12-15
Attending: INTERNAL MEDICINE
Payer: COMMERCIAL

## 2021-12-15 ENCOUNTER — ANESTHESIA EVENT (OUTPATIENT)
Dept: GASTROENTEROLOGY | Facility: HOSPITAL | Age: 66
End: 2021-12-15

## 2021-12-15 VITALS
SYSTOLIC BLOOD PRESSURE: 106 MMHG | DIASTOLIC BLOOD PRESSURE: 64 MMHG | RESPIRATION RATE: 16 BRPM | BODY MASS INDEX: 41.15 KG/M2 | WEIGHT: 247 LBS | TEMPERATURE: 97.2 F | HEART RATE: 64 BPM | OXYGEN SATURATION: 95 % | HEIGHT: 65 IN

## 2021-12-15 DIAGNOSIS — K22.710 BARRETT'S ESOPHAGUS WITH LOW GRADE DYSPLASIA: ICD-10-CM

## 2021-12-15 DIAGNOSIS — K31.89 GASTRIC NODULE: ICD-10-CM

## 2021-12-15 PROCEDURE — 88305 TISSUE EXAM BY PATHOLOGIST: CPT | Performed by: PATHOLOGY

## 2021-12-15 PROCEDURE — 43239 EGD BIOPSY SINGLE/MULTIPLE: CPT | Performed by: INTERNAL MEDICINE

## 2021-12-15 RX ORDER — PROPOFOL 10 MG/ML
INJECTION, EMULSION INTRAVENOUS AS NEEDED
Status: DISCONTINUED | OUTPATIENT
Start: 2021-12-15 | End: 2021-12-15

## 2021-12-15 RX ORDER — SODIUM CHLORIDE 9 MG/ML
INJECTION, SOLUTION INTRAVENOUS CONTINUOUS PRN
Status: DISCONTINUED | OUTPATIENT
Start: 2021-12-15 | End: 2021-12-15

## 2021-12-15 RX ORDER — LIDOCAINE HYDROCHLORIDE 10 MG/ML
INJECTION, SOLUTION EPIDURAL; INFILTRATION; INTRACAUDAL; PERINEURAL AS NEEDED
Status: DISCONTINUED | OUTPATIENT
Start: 2021-12-15 | End: 2021-12-15

## 2021-12-15 RX ORDER — PROPOFOL 10 MG/ML
INJECTION, EMULSION INTRAVENOUS CONTINUOUS PRN
Status: DISCONTINUED | OUTPATIENT
Start: 2021-12-15 | End: 2021-12-15

## 2021-12-15 RX ADMIN — PROPOFOL 80 MG: 10 INJECTION, EMULSION INTRAVENOUS at 14:05

## 2021-12-15 RX ADMIN — SODIUM CHLORIDE: 0.9 INJECTION, SOLUTION INTRAVENOUS at 14:01

## 2021-12-15 RX ADMIN — LIDOCAINE HYDROCHLORIDE 100 MG: 10 INJECTION, SOLUTION EPIDURAL; INFILTRATION; INTRACAUDAL; PERINEURAL at 14:05

## 2021-12-15 RX ADMIN — PROPOFOL 120 MCG/KG/MIN: 10 INJECTION, EMULSION INTRAVENOUS at 14:05

## 2021-12-16 ENCOUNTER — TELEPHONE (OUTPATIENT)
Dept: OTHER | Facility: OTHER | Age: 66
End: 2021-12-16

## 2021-12-16 ENCOUNTER — TELEPHONE (OUTPATIENT)
Dept: GASTROENTEROLOGY | Facility: CLINIC | Age: 66
End: 2021-12-16

## 2021-12-20 DIAGNOSIS — K22.710 BARRETT'S ESOPHAGUS WITH LOW GRADE DYSPLASIA: ICD-10-CM

## 2021-12-20 RX ORDER — PANTOPRAZOLE SODIUM 40 MG/1
40 TABLET, DELAYED RELEASE ORAL
Qty: 60 TABLET | Refills: 5 | Status: SHIPPED | OUTPATIENT
Start: 2021-12-20 | End: 2021-12-21

## 2021-12-21 RX ORDER — PANTOPRAZOLE SODIUM 20 MG/1
TABLET, DELAYED RELEASE ORAL
Qty: 60 TABLET | Refills: 5 | Status: SHIPPED | OUTPATIENT
Start: 2021-12-21 | End: 2021-12-27

## 2021-12-27 ENCOUNTER — TELEPHONE (OUTPATIENT)
Dept: GASTROENTEROLOGY | Facility: CLINIC | Age: 66
End: 2021-12-27

## 2022-01-20 ENCOUNTER — TELEPHONE (OUTPATIENT)
Dept: GASTROENTEROLOGY | Facility: MEDICAL CENTER | Age: 67
End: 2022-01-20

## 2022-01-20 NOTE — TELEPHONE ENCOUNTER
Submitted Prior Auth for Pantoprazole 20 mg  Key: [id number]    Prior Auth states the patient currently has access to the requested medication and a Prior Authorization is not needed for the patient/medication

## 2022-01-20 NOTE — TELEPHONE ENCOUNTER
Please disregard previous message  PA was not submitted with correct info  Bella Last will resubmit

## 2022-02-20 DIAGNOSIS — I10 ESSENTIAL HYPERTENSION: ICD-10-CM

## 2022-02-21 RX ORDER — VALSARTAN 320 MG/1
TABLET ORAL
Qty: 90 TABLET | Refills: 1 | Status: SHIPPED | OUTPATIENT
Start: 2022-02-21 | End: 2022-08-08

## 2022-02-24 ENCOUNTER — TELEPHONE (OUTPATIENT)
Dept: GASTROENTEROLOGY | Facility: CLINIC | Age: 67
End: 2022-02-24

## 2022-02-24 NOTE — TELEPHONE ENCOUNTER
Patients GI provider:  Dr Kaity Mcknight    Number to return call: [phone]    Reason for call: Eleonora Awan calling from [facility] requesting to speak with someone regarding clarification instructions on Pantoprazole       Scheduled procedure/appointment date if applicable: N/A

## 2022-02-26 ENCOUNTER — OFFICE VISIT (OUTPATIENT)
Dept: URGENT CARE | Age: 67
End: 2022-02-26
Payer: COMMERCIAL

## 2022-02-26 VITALS
BODY MASS INDEX: 41.15 KG/M2 | DIASTOLIC BLOOD PRESSURE: 78 MMHG | TEMPERATURE: 97.3 F | OXYGEN SATURATION: 96 % | HEIGHT: 65 IN | SYSTOLIC BLOOD PRESSURE: 118 MMHG | WEIGHT: 247 LBS | HEART RATE: 54 BPM

## 2022-02-26 DIAGNOSIS — H10.32 ACUTE CONJUNCTIVITIS OF LEFT EYE, UNSPECIFIED ACUTE CONJUNCTIVITIS TYPE: Primary | ICD-10-CM

## 2022-02-26 PROCEDURE — S9083 URGENT CARE CENTER GLOBAL: HCPCS | Performed by: NURSE PRACTITIONER

## 2022-02-26 PROCEDURE — 99213 OFFICE O/P EST LOW 20 MIN: CPT | Performed by: NURSE PRACTITIONER

## 2022-02-26 RX ORDER — OFLOXACIN 3 MG/ML
1 SOLUTION/ DROPS OPHTHALMIC 4 TIMES DAILY
Qty: 5 ML | Refills: 0 | Status: SHIPPED | OUTPATIENT
Start: 2022-02-26

## 2022-02-26 NOTE — PROGRESS NOTES
[facility]        NAME: Ankit Townsend is a 78 y o  male  : 1955    MRN: [id number]  DATE: 2022  TIME: 12:57 PM    Assessment and Plan   Acute conjunctivitis of left eye, unspecified acute conjunctivitis type [H10 32]  1  Acute conjunctivitis of left eye, unspecified acute conjunctivitis type  ofloxacin (OCUFLOX) 0 3 % ophthalmic solution         Patient Instructions     Use drops as directed x 5 days  Frequent hand washing   Follow up with PCP  Proceed to  ER if symptoms worsen  Chief Complaint     Chief Complaint   Patient presents with    Conjunctivitis     for about 2 days L eye became bloodshot/red, then having some drainage and discomfort in the L eye  L Nares draining also  Eye is sensitive to the light and when he lays down it feels like something is in the eye, irritating  Vision with glasses L eye 20/30 and R eye 20/40 (using glasses)  Has not been using contacts due to irriation  No recent colds etc  Denies fever and chills  History of Present Illness       HPI   Reports redness in the left eye, x 2 days  Started after using contacts  Getting worse  Now using glasses x 24 hrs  Review of Systems   Review of Systems   Constitutional: Negative for chills and fever  HENT: Positive for rhinorrhea  Negative for congestion, hearing loss, sinus pressure and sore throat  Eyes: Positive for photophobia, discharge (watery), redness and itching  Negative for visual disturbance  Respiratory: Negative for cough, chest tightness, shortness of breath and wheezing  Gastrointestinal: Negative for nausea and vomiting  Neurological: Negative for headaches           Current Medications       Current Outpatient Medications:     amLODIPine (NORVASC) 10 mg tablet, Take 10 mg by mouth daily, Disp: , Rfl:     aspirin 81 MG tablet, Take 81 mg by mouth , Disp: , Rfl:     calcium carbonate (TUMS) 500 mg chewable tablet, Chew 2 tablets every 4 (four) hours as needed , Disp: , Rfl:     CHOLECALCIFEROL PO, Take 2,000 Units by mouth Chew form, Disp: , Rfl:     cimetidine (TAGAMET) 200 mg tablet, Take 200 mg by mouth daily, Disp: , Rfl:     Coenzyme Q10 (COQ-10) 100 MG CAPS, Take 100 mg by mouth daily , Disp: , Rfl:     DENTA 5000 PLUS 1 1 % CREA, BRUSH TWICE DAILY  AFTER PM BRUSHING EXPECTORATE BUT DO NOT RINSE, Disp: , Rfl:     ezetimibe (ZETIA) 10 mg tablet, TAKE 1 TABLET BY MOUTH EVERY DAY, Disp: 90 tablet, Rfl: 3    fluticasone (FLONASE) 50 mcg/act nasal spray, 2 sprays into each nostril daily as needed , Disp: , Rfl:     furosemide (LASIX) 20 mg tablet, TAKE 1 TABLET (20 MG TOTAL) BY MOUTH DAILY  , Disp: 90 tablet, Rfl: 3    Klor-Con M10 10 MEQ tablet, TAKE 2 TABLETS BY MOUTH DAILY, Disp: 60 tablet, Rfl: 11    multivitamin (THERAGRAN) TABS, Take 1 tablet by mouth Gummy form, Disp: , Rfl:     Omega-3 Fatty Acids (FISH OIL PO), Take 1 capsule by mouth daily Gummy form, Disp: , Rfl:     pantoprazole (PROTONIX) 20 mg tablet, Take 1 tablet (20 mg total) by mouth daily, Disp: 90 tablet, Rfl: 1    sotalol (BETAPACE) 80 mg tablet, Take 80 mg by mouth 2 (two) times a day , Disp: , Rfl:     valsartan (DIOVAN) 320 MG tablet, TAKE 1 TABLET BY MOUTH EVERY DAY, Disp: 90 tablet, Rfl: 1    XARELTO 20 MG tablet, TAKE 1 TABLET BY MOUTH ONCE NDAILY WITH DINNER, Disp: 30 tablet, Rfl: 0    methylPREDNISolone 4 MG tablet therapy pack, Use as directed on package, Disp: 21 each, Rfl: 0    ofloxacin (OCUFLOX) 0 3 % ophthalmic solution, Administer 1 drop into the left eye 4 (four) times a day X 5 days, Disp: 5 mL, Rfl: 0    Current Allergies     Allergies as of 02/26/2022 - Reviewed 02/26/2022   Allergen Reaction Noted    Iodine - food allergy Shortness Of Breath 09/10/2011    Omnipaque [iohexol] Anaphylaxis 03/13/2017    Erythromycin Hives 09/10/2011    Iodixanol  12/12/2019    Lisinopril Cough 08/31/2015    Statins Myalgia 09/11/2015            The following portions of the patient's history were reviewed and updated as appropriate: allergies, current medications, past family history, past medical history, past social history, past surgical history and problem list      Past Medical History:   Diagnosis Date    Allergic rhinitis due to pollen     last assessed 10/01/15    Aortic aneurysm ([facility] )     4 3 cm  6/2018 last check    Alvares esophagus     CPAP (continuous positive airway pressure) dependence     Diabetes ([facility] [facility] )     GERD (gastroesophageal reflux disease)     Hyperlipidemia     Hypertension     Irregular heart beat     Mild intermittent asthma without complication     last assessed 10/01/15    Positive PPD     last assesssed 12/21/15    Sleep apnea     Stroke [facility])     unsure if/when happened; no weakness    Syncopal episodes     1 year ago    Vertigo     last assessed 04/24/17       Past Surgical History:   Procedure Laterality Date    CARDIAC CATHETERIZATION      COLONOSCOPY N/A 11/14/2018    Procedure: COLONOSCOPY;  Surgeon: Adriana Dimas MD;  [facility]; Service: Colorectal    EGD AND COLONOSCOPY      ENDOSCOPIC ULTRASOUND (LOWER)  01/23/2020    HERNIA REPAIR      JOINT REPLACEMENT Left     knee    MULTIPLE TOOTH EXTRACTIONS  05/2020    TOTAL KNEE ARTHROPLASTY  08/29/2016    Dr Ochoa [facility] 07/28/16    UPPER GASTROINTESTINAL ENDOSCOPY  10/21/2019    URETHRA SURGERY      polyps removed       Family History   Problem Relation Age of Onset    Heart disease Mother     Hypertension Mother     Coronary artery disease Father     Hypertension Father     Heart disease Father         cardiac disorder    Stomach cancer Maternal Grandmother     Thyroid disease Neg Hx          Medications have been verified  Objective   /78   Pulse (!) 54   Temp (!) 97 3 °F (36 3 °C)   Ht 5' 5" (1 651 m)   Wt 112 kg (247 lb)   SpO2 96%   BMI 41 10 kg/m²   No LMP for male patient         Physical Exam     Physical Exam  Constitutional: Appearance: He is not ill-appearing or diaphoretic  HENT:      Right Ear: Tympanic membrane and ear canal normal       Left Ear: Tympanic membrane and ear canal normal    Eyes:      General:         Left eye: No discharge  Extraocular Movements: Extraocular movements intact  Pupils: Pupils are equal, round, and reactive to light  Comments: Left conjunctival erythema, with erythema on the mucous membranes of the upper and lower eyelids  Mild swelling of the eyelids   Cardiovascular:      Rate and Rhythm: Regular rhythm  Heart sounds: Normal heart sounds  Pulmonary:      Effort: Pulmonary effort is normal       Breath sounds: Normal breath sounds  No wheezing

## 2022-02-26 NOTE — PATIENT INSTRUCTIONS

## 2022-03-01 ENCOUNTER — OFFICE VISIT (OUTPATIENT)
Dept: INTERNAL MEDICINE CLINIC | Facility: CLINIC | Age: 67
End: 2022-03-01
Payer: COMMERCIAL

## 2022-03-01 VITALS
DIASTOLIC BLOOD PRESSURE: 64 MMHG | WEIGHT: 242.4 LBS | HEIGHT: 65 IN | BODY MASS INDEX: 40.39 KG/M2 | OXYGEN SATURATION: 97 % | TEMPERATURE: 98.2 F | HEART RATE: 62 BPM | RESPIRATION RATE: 14 BRPM | SYSTOLIC BLOOD PRESSURE: 128 MMHG

## 2022-03-01 DIAGNOSIS — Z23 ENCOUNTER FOR IMMUNIZATION: Primary | ICD-10-CM

## 2022-03-01 DIAGNOSIS — I10 PRIMARY HYPERTENSION: ICD-10-CM

## 2022-03-01 DIAGNOSIS — R00.1 BRADYCARDIA: ICD-10-CM

## 2022-03-01 DIAGNOSIS — H10.32 ACUTE BACTERIAL CONJUNCTIVITIS OF LEFT EYE: ICD-10-CM

## 2022-03-01 PROBLEM — S60.419A FINGER ABRASION: Status: ACTIVE | Noted: 2022-03-01

## 2022-03-01 PROCEDURE — 99214 OFFICE O/P EST MOD 30 MIN: CPT | Performed by: INTERNAL MEDICINE

## 2022-03-01 RX ORDER — AMLODIPINE BESYLATE 5 MG/1
5 TABLET ORAL DAILY
Qty: 30 TABLET | Refills: 1 | Status: SHIPPED | OUTPATIENT
Start: 2022-03-01 | End: 2022-04-21

## 2022-03-01 NOTE — PROGRESS NOTES
Assessment/Plan:    HTN (hypertension)  Reports me significant improvements in his weight following healthy balance diet also improvements in his blood pressure he also reports me gingival enlargement likely from amlodipine will have patient reduce amlodipine to 5 mg once daily monitor blood pressure twice daily if blood pressure greater than 147 systolic notify me immediately if he continues to lose weight we may be able to get him on the amlodipine    Bradycardia  Currently he is not bradycardic will check Holter monitor and have patient follow-up with Cardiology he might be a good candidate for an ablation patient does report me when he is monitoring intermittently he may see heart rate in the 30s asymptomatic    Acute conjunctivitis of left eye  Resolved complete the antibiotic drop may return back to work on 03/03/2022 may resume using contacts after resolution of symptoms if any recurrence please notify me immediately    Finger abrasion  Clean and dry and healing well no sign or symptom of active infection I would like him to use a chlorhexidine so wash twice daily also use Telfa pad change twice daily, today we did change the bandage to a Telfa pad and provided send area was cleaned with alcohol swab         Problem List Items Addressed This Visit        Cardiovascular and Mediastinum    HTN (hypertension)     Reports me significant improvements in his weight following healthy balance diet also improvements in his blood pressure he also reports me gingival enlargement likely from amlodipine will have patient reduce amlodipine to 5 mg once daily monitor blood pressure twice daily if blood pressure greater than 057 systolic notify me immediately if he continues to lose weight we may be able to get him on the amlodipine         Relevant Medications    amLODIPine (NORVASC) 5 mg tablet       Other    Bradycardia     Currently he is not bradycardic will check Holter monitor and have patient follow-up with Cardiology he might be a good candidate for an ablation patient does report me when he is monitoring intermittently he may see heart rate in the 30s asymptomatic         Relevant Orders    Ambulatory Referral to Cardiology    Holter monitor    Acute conjunctivitis of left eye     Resolved complete the antibiotic drop may return back to work on 03/03/2022 may resume using contacts after resolution of symptoms if any recurrence please notify me immediately           Other Visit Diagnoses     Encounter for immunization    -  Primary          RTO in 3 months call if any problems  Subjective:      Patient ID: Kathi Sage is a 78 y o  male  HPI 69-year-old male who is coming in for follow-up examination regarding conjunctivitis, bradycardia, hypertension; The patient reports me compliant taking medications without untoward side effects the  The patient is here to review his medical condition, update me on the medical condition and the patient reports me no hospitalizations and no ER visits  left eye contact related he reports me his contact her out he is on ciprofloxacin drops    Also reports me over weekend he developed a abrasion of the 4th digit right fingertips secondary to tomato slicer he had abraded the skin  Does report me come swelling and irritation he has noticed by his dentist also, he reports me that he has lost significant amounts of weight following healthy imbalance diet his blood pressure is now improved significantly at this point time will cut down his amlodipine to 5 mg once daily also reports me noticing his heart rate going into the 30s he has not been to his cardiologist he is currently on Betapace  The following portions of the patient's history were reviewed and updated as appropriate: allergies, current medications, past family history, past medical history, past social history, past surgical history and problem list     Review of Systems   Constitutional: Negative for activity change, appetite change and unexpected weight change  HENT:        Reports me gingival enlargement   Eyes: Negative for visual disturbance  Conjunctivitis improved   Respiratory: Negative for cough and shortness of breath  Cardiovascular: Negative for chest pain  He reports me intermittent low heart rate   Gastrointestinal: Negative for abdominal pain, diarrhea, nausea and vomiting  Neurological: Negative for dizziness, light-headedness and headaches  abrasion of the finger    Objective:    No follow-ups on file  No results found  Allergies   Allergen Reactions    Iodine - Food Allergy Shortness Of Breath     Other reaction(s): Respiratory Distress  Other reaction(s): Respiratory Distress    Omnipaque [Iohexol] Anaphylaxis    Erythromycin Hives     Other reaction(s): Other (See Comments)  Pleurisy   Pleurisy   Other reaction(s): Other (See Comments)  Pleurisy     Iodixanol     Lisinopril Cough    Statins Myalgia       Past Medical History:   Diagnosis Date    Allergic rhinitis due to pollen     last assessed 10/01/15    Aortic aneurysm (HCC)     4 3 cm  6/2018 last check    Alvares esophagus     CPAP (continuous positive airway pressure) dependence     Diabetes ([address] )     GERD (gastroesophageal reflux disease)     Hyperlipidemia     Hypertension     Irregular heart beat     Mild intermittent asthma without complication     last assessed 10/01/15    Positive PPD     last assesssed 12/21/15    Sleep apnea     Stroke [facility])     unsure if/when happened; no weakness    Syncopal episodes     1 year ago    Vertigo     last assessed 04/24/17     Past Surgical History:   Procedure Laterality Date    CARDIAC CATHETERIZATION      COLONOSCOPY N/A 11/14/2018    Procedure: COLONOSCOPY;  Surgeon: Delisa Greco MD;  Location: [facility];   Service: Colorectal    EGD AND COLONOSCOPY      ENDOSCOPIC ULTRASOUND (LOWER)  01/23/2020    HERNIA REPAIR      JOINT REPLACEMENT Left     knee  MULTIPLE TOOTH EXTRACTIONS  05/2020    TOTAL KNEE ARTHROPLASTY  08/29/2016    Dr Martha Marti 07/28/16    UPPER GASTROINTESTINAL ENDOSCOPY  10/21/2019    URETHRA SURGERY      polyps removed     Current Outpatient Medications on File Prior to Visit   Medication Sig Dispense Refill    aspirin 81 MG tablet Take 81 mg by mouth       calcium carbonate (TUMS) 500 mg chewable tablet Chew 2 tablets every 4 (four) hours as needed       CHOLECALCIFEROL PO Take 2,000 Units by mouth Chew form      Coenzyme Q10 (COQ-10) 100 MG CAPS Take 100 mg by mouth daily       DENTA 5000 PLUS 1 1 % CREA BRUSH TWICE DAILY  AFTER PM BRUSHING EXPECTORATE BUT DO NOT RINSE      ezetimibe (ZETIA) 10 mg tablet TAKE 1 TABLET BY MOUTH EVERY DAY 90 tablet 3    fluticasone (FLONASE) 50 mcg/act nasal spray 2 sprays into each nostril daily as needed       furosemide (LASIX) 20 mg tablet TAKE 1 TABLET (20 MG TOTAL) BY MOUTH DAILY   90 tablet 3    Klor-Con M10 10 MEQ tablet TAKE 2 TABLETS BY MOUTH DAILY 60 tablet 11    multivitamin (THERAGRAN) TABS Take 1 tablet by mouth Gummy form      ofloxacin (OCUFLOX) 0 3 % ophthalmic solution Administer 1 drop into the left eye 4 (four) times a day X 5 days 5 mL 0    Omega-3 Fatty Acids (FISH OIL PO) Take 1 capsule by mouth daily Gummy form      pantoprazole (PROTONIX) 20 mg tablet Take 1 tablet (20 mg total) by mouth daily 90 tablet 1    sotalol (BETAPACE) 80 mg tablet Take 20 mg by mouth daily        valsartan (DIOVAN) 320 MG tablet TAKE 1 TABLET BY MOUTH EVERY DAY 90 tablet 1    XARELTO 20 MG tablet TAKE 1 TABLET BY MOUTH ONCE NDAILY WITH DINNER 30 tablet 0    [DISCONTINUED] amLODIPine (NORVASC) 10 mg tablet Take 10 mg by mouth daily      cimetidine (TAGAMET) 200 mg tablet Take 200 mg by mouth daily (Patient not taking: Reported on 3/1/2022 )      methylPREDNISolone 4 MG tablet therapy pack Use as directed on package (Patient not taking: Reported on 3/1/2022 ) 21 each 0     No current facility-administered medications on file prior to visit       Family History   Problem Relation Age of Onset    Heart disease Mother     Hypertension Mother     Coronary artery disease Father     Hypertension Father     Heart disease Father         cardiac disorder    Stomach cancer Maternal Grandmother     Thyroid disease Neg Hx      Social History     Socioeconomic History    Marital status: /Civil Union     Spouse name: Not on file    Number of children: 2    Years of education: Not on file    Highest education level: Not on file   Occupational History    Not on file   Tobacco Use    Smoking status: Never Smoker    Smokeless tobacco: Never Used   Vaping Use    Vaping Use: Never used   Substance and Sexual Activity    Alcohol use: Never    Drug use: No    Sexual activity: Yes   Other Topics Concern    Not on file   Social History Narrative    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[address] Strain: Not on file   Food Insecurity: Not on file   Transportation Needs: Not on file   Physical Activity: Not on file   Stress: Not on file   Social Connections: Not on file   Intimate Partner Violence: Not on file   Housing Stability: Not on file     Vitals:    03/01/22 1607   BP: 128/64   Pulse: 62   Resp: 14   Temp: 98 2 °F (36 8 °C)   SpO2: 97%   Weight: 110 kg (242 lb 6 4 oz)   Height: 5' 5" (1 651 m)     Results for orders placed or performed during the hospital encounter of 12/15/21   Tissue Exam   Result Value Ref Range    Case Report       Surgical Pathology Report                         Case: [id number]                                   Authorizing Provider:  Zeyad Hart MD             Collected:           12/15/2021 1411              Ordering Location:     [address] [place] [address]      Received:            12/15/2021 [address] [place] [address] Endoscopy                                                           Pathologist: Elizabeth Avila DO                                                     Specimens:   A) - Esophagogastric junction, h/o haider's                                                        B) - Stomach, gastritis                                                                    Final Diagnosis       A  Esophagogastric Junction, Biopsy:  - Squamous and cardiac-type gastric mucosa with nonspecific chronic inflammation and reactive changes  - Negative for intestinal metaplasia and dysplasia  B  Stomach, Biopsy:  - Oxyntic- and antral-type gastric mucosa with mild chronic inactive gastritis  - Negative for H  pylori organisms on H&E stain  Note       The endoscopic report was reviewed  Additional Information       All reported additional testing was performed with appropriately reactive controls  These tests were developed and their performance characteristics determined by [facility] or appropriate performing facility, though some tests may be performed on tissues which have not been validated for performance characteristics (such as staining performed on alcohol exposed cell blocks and decalcified tissues)  Results should be interpreted with caution and in the context of the patients clinical condition  These tests may not be cleared or approved by the [place]  Food and Drug Administration, though the FDA has determined that such clearance or approval is not necessary  These tests are used for clinical purposes and they should not be regarded as investigational or for research  This laboratory has been approved by CLIA 88, designated as a high-complexity laboratory and is qualified to perform these tests  Interpretation performed at [facility], [address] [facility] [address]      Gross Description          A   The specimen is received in formalin, labeled with the patient's name and hospital number, and is designated "esophagogastric junction, history of haider's "  The specimen consists of 4 tan flat and elongated soft tissue fragments measuring 0 2-0 8 cm in greatest dimension  The specimen is entirely submitted in a screened cassette  B  The specimen is received in formalin, labeled with the patient's name and hospital number, and is designated "stomach gastritis biopsy  The specimen consists of 3 tan flat and elongated soft tissue fragments measuring 0 1-0 9 cm in greatest dimension  The specimen is entirely submitted in a screened cassette  Note: The estimated total formalin fixation time based upon information provided by the submitting clinician and the standard processing schedule is under 72 hours  Beenaez              Weight (last 2 days)     Date/Time Weight    03/01/22 1607 110 (242 4)        Body mass index is 40 34 kg/m²  BP      Temp      Pulse     Resp      SpO2        Vitals:    03/01/22 1607   Weight: 110 kg (242 lb 6 4 oz)     Vitals:    03/01/22 1607   Weight: 110 kg (242 lb 6 4 oz)       /64   Pulse 62   Temp 98 2 °F (36 8 °C)   Resp 14   Ht 5' 5" (1 651 m)   Wt 110 kg (242 lb 6 4 oz)   SpO2 97%   BMI 40 34 kg/m²          Physical Exam  Constitutional:       General: He is not in acute distress  Appearance: He is well-developed  He is not diaphoretic  HENT:      Head: Normocephalic and atraumatic  Right Ear: External ear normal       Left Ear: External ear normal    Eyes:      General: No scleral icterus  Right eye: No discharge  Left eye: No discharge  Conjunctiva/sclera: Conjunctivae normal       Pupils: Pupils are equal, round, and reactive to light  Cardiovascular:      Rate and Rhythm: Normal rate and regular rhythm  Heart sounds: Normal heart sounds  No murmur heard  No friction rub  No gallop  Pulmonary:      Effort: No respiratory distress  Breath sounds: No wheezing or rales  Musculoskeletal:      Cervical back: Neck supple     Lymphadenopathy: Cervical: No cervical adenopathy  Neurological:      Mental Status: He is alert         resolved conjunctivitis  Finger operation is clean and dry and healing well no erythema no pus

## 2022-03-01 NOTE — LETTER
March 1, 2022     Patient: Leland Villalba   YOB: 1955   Date of Visit: 3/1/2022       To Whom it May Concern:    Leland Villalba is under my professional care  He was seen in my office on 3/1/2022  He may return to work on 3/3/222  If you have any questions or concerns, please don't hesitate to call           Sincerely,          Ashwin Otoole DO        CC: No Recipients

## 2022-03-02 NOTE — ASSESSMENT & PLAN NOTE
Clean and dry and healing well no sign or symptom of active infection I would like him to use a chlorhexidine so wash twice daily also use Telfa pad change twice daily, today we did change the bandage to a Telfa pad and provided send area was cleaned with alcohol swab

## 2022-03-02 NOTE — ASSESSMENT & PLAN NOTE
Currently he is not bradycardic will check Holter monitor and have patient follow-up with Cardiology he might be a good candidate for an ablation patient does report me when he is monitoring intermittently he may see heart rate in the 30s asymptomatic

## 2022-03-02 NOTE — ASSESSMENT & PLAN NOTE
Resolved complete the antibiotic drop may return back to work on 03/03/2022 may resume using contacts after resolution of symptoms if any recurrence please notify me immediately

## 2022-03-02 NOTE — ASSESSMENT & PLAN NOTE
Reports me significant improvements in his weight following healthy balance diet also improvements in his blood pressure he also reports me gingival enlargement likely from amlodipine will have patient reduce amlodipine to 5 mg once daily monitor blood pressure twice daily if blood pressure greater than 777 systolic notify me immediately if he continues to lose weight we may be able to get him on the amlodipine

## 2022-03-22 ENCOUNTER — OFFICE VISIT (OUTPATIENT)
Dept: INTERNAL MEDICINE CLINIC | Facility: CLINIC | Age: 67
End: 2022-03-22
Payer: COMMERCIAL

## 2022-03-22 ENCOUNTER — TELEPHONE (OUTPATIENT)
Dept: OTHER | Facility: OTHER | Age: 67
End: 2022-03-22

## 2022-03-22 VITALS
RESPIRATION RATE: 16 BRPM | WEIGHT: 237.6 LBS | HEIGHT: 65 IN | HEART RATE: 51 BPM | DIASTOLIC BLOOD PRESSURE: 72 MMHG | OXYGEN SATURATION: 99 % | BODY MASS INDEX: 39.58 KG/M2 | SYSTOLIC BLOOD PRESSURE: 122 MMHG

## 2022-03-22 DIAGNOSIS — M54.50 ACUTE RIGHT-SIDED LOW BACK PAIN WITHOUT SCIATICA: Primary | ICD-10-CM

## 2022-03-22 PROCEDURE — 3008F BODY MASS INDEX DOCD: CPT | Performed by: NURSE PRACTITIONER

## 2022-03-22 PROCEDURE — 3074F SYST BP LT 130 MM HG: CPT | Performed by: NURSE PRACTITIONER

## 2022-03-22 PROCEDURE — 1160F RVW MEDS BY RX/DR IN RCRD: CPT | Performed by: NURSE PRACTITIONER

## 2022-03-22 PROCEDURE — 3078F DIAST BP <80 MM HG: CPT | Performed by: NURSE PRACTITIONER

## 2022-03-22 PROCEDURE — 1036F TOBACCO NON-USER: CPT | Performed by: NURSE PRACTITIONER

## 2022-03-22 PROCEDURE — 99213 OFFICE O/P EST LOW 20 MIN: CPT | Performed by: NURSE PRACTITIONER

## 2022-03-22 RX ORDER — METHYLPREDNISOLONE 4 MG/1
TABLET ORAL
Qty: 21 EACH | Refills: 0 | Status: SHIPPED | OUTPATIENT
Start: 2022-03-22

## 2022-03-22 RX ORDER — LIDOCAINE 50 MG/G
1 PATCH TOPICAL EVERY 24 HOURS
COMMUNITY
Start: 2022-03-21 | End: 2022-03-28

## 2022-03-22 NOTE — PROGRESS NOTES
Assessment/Plan:    Acute right-sided low back pain without sciatica  Stay out of work to return on Monday  He will be seeing his chiropractor Dr Duke Khan  Try prednisone pack continue lidocaine and Robaxin       Diagnoses and all orders for this visit:    Acute right-sided low back pain without sciatica  -     lidocaine (LIDODERM) 5 %; Place 1 patch on the skin every 24 hours  -     methylPREDNISolone 4 MG tablet therapy pack; Use as directed on package          Subjective:      Patient ID: Ayesha Blanchard is a 78 y o  male  Patient is here for follow-up of ER he was moving furniture and suddenly experienced pain his lower back following day  He went to the hospital x-ray of lumbar spine showed " There is grade 1 spondylolisthesis at L4-5  No pars interarticularis  defects can be detected  Prominent disc space narrowing is noted at L5-S1 "  He is able to sit and walk off ever getting up from a sitting position is very painful for him  He is taking Robaxin and lidocaine patches  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      The following portions of the patient's history were reviewed and updated as appropriate: allergies, current medications, past family history, past medical history, past social history, past surgical history and problem list     Review of Systems   Constitutional: Negative  HENT: Negative  Eyes: Negative  Respiratory: Negative  Cardiovascular: Negative  Gastrointestinal: Negative  Musculoskeletal: Positive for back pain  Neurological: Negative  Objective:      /72   Pulse (!) 51   Resp 16   Ht 5' 5" (1 651 m)   Wt 108 kg (237 lb 9 6 oz)   SpO2 99%   BMI 39 54 kg/m²          Physical Exam  Vitals and nursing note reviewed  Constitutional:       Appearance: He is well-developed  HENT:      Head: Normocephalic and atraumatic        Right Ear: External ear normal       Left Ear: External ear normal       Nose: Nose normal    Eyes:      Conjunctiva/sclera: Conjunctivae normal       Pupils: Pupils are equal, round, and reactive to light  Cardiovascular:      Rate and Rhythm: Normal rate and regular rhythm  Pulmonary:      Effort: Pulmonary effort is normal       Breath sounds: Normal breath sounds  Musculoskeletal:      Cervical back: Normal range of motion and neck supple  Lumbar back: Spasms and tenderness present  Decreased range of motion  Back:    Skin:     General: Skin is warm and dry  Neurological:      Mental Status: He is alert and oriented to person, place, and time

## 2022-03-22 NOTE — ASSESSMENT & PLAN NOTE
Stay out of work to return on Monday  He will be seeing his chiropractor Dr Calista Villafuerte    Try prednisone pack continue lidocaine and Robaxin

## 2022-03-22 NOTE — LETTER
March 22, 2022     Patient: Cherry Cheng   YOB: 1955   Date of Visit: 3/22/2022       To Whom it May Concern:    Cherry Cheng is under my professional care  He was seen in my office on 3/22/2022  He may return to work on 3/28/22               Sincerely,          LUIS ENRIQUE Jackson        CC: No Recipients

## 2022-03-22 NOTE — TELEPHONE ENCOUNTER
Patient stated he was seen at [facility] for back pain and would like to make a hospital F/U appointment

## 2022-04-14 ENCOUNTER — TELEPHONE (OUTPATIENT)
Dept: NEUROLOGY | Facility: CLINIC | Age: 67
End: 2022-04-14

## 2022-04-14 NOTE — TELEPHONE ENCOUNTER
Heike Becker called to schedule a follow up with Dr Juliana Lopez or Teresita Maloney  His LOV is 3-30-21  I scheduled him for 4/27/22 at 8 am at the [facility]

## 2022-04-21 ENCOUNTER — RA CDI HCC (OUTPATIENT)
Dept: OTHER | Facility: HOSPITAL | Age: 67
End: 2022-04-21

## 2022-04-21 DIAGNOSIS — I10 PRIMARY HYPERTENSION: ICD-10-CM

## 2022-04-21 RX ORDER — AMLODIPINE BESYLATE 5 MG/1
5 TABLET ORAL DAILY
Qty: 30 TABLET | Refills: 1 | Status: SHIPPED | OUTPATIENT
Start: 2022-04-21 | End: 2022-04-28 | Stop reason: SDUPTHER

## 2022-04-21 NOTE — PROGRESS NOTES
[facility]  coding opportunities       Chart reviewed, no opportunity found:   [address]        Patients Insurance     Medicare Insurance: [employer] Advantage

## 2022-04-23 DIAGNOSIS — E87.6 HYPOKALEMIA: ICD-10-CM

## 2022-04-23 RX ORDER — POTASSIUM CHLORIDE 750 MG/1
TABLET, EXTENDED RELEASE ORAL
Qty: 180 TABLET | Refills: 3 | Status: SHIPPED | OUTPATIENT
Start: 2022-04-23

## 2022-04-27 ENCOUNTER — OFFICE VISIT (OUTPATIENT)
Dept: NEUROLOGY | Facility: CLINIC | Age: 67
End: 2022-04-27
Payer: COMMERCIAL

## 2022-04-27 VITALS
WEIGHT: 233.1 LBS | BODY MASS INDEX: 38.84 KG/M2 | TEMPERATURE: 96.8 F | SYSTOLIC BLOOD PRESSURE: 147 MMHG | DIASTOLIC BLOOD PRESSURE: 75 MMHG | HEART RATE: 66 BPM | HEIGHT: 65 IN

## 2022-04-27 DIAGNOSIS — I48.0 PAROXYSMAL ATRIAL FIBRILLATION (HCC): ICD-10-CM

## 2022-04-27 DIAGNOSIS — Z86.73 HISTORY OF TRANSIENT ISCHEMIC ATTACK (TIA): Primary | ICD-10-CM

## 2022-04-27 PROCEDURE — 1036F TOBACCO NON-USER: CPT | Performed by: PSYCHIATRY & NEUROLOGY

## 2022-04-27 PROCEDURE — 1160F RVW MEDS BY RX/DR IN RCRD: CPT | Performed by: PSYCHIATRY & NEUROLOGY

## 2022-04-27 PROCEDURE — 3078F DIAST BP <80 MM HG: CPT | Performed by: PSYCHIATRY & NEUROLOGY

## 2022-04-27 PROCEDURE — 3077F SYST BP >= 140 MM HG: CPT | Performed by: PSYCHIATRY & NEUROLOGY

## 2022-04-27 PROCEDURE — 99213 OFFICE O/P EST LOW 20 MIN: CPT | Performed by: PSYCHIATRY & NEUROLOGY

## 2022-04-27 NOTE — LETTER
April 27, 2022     Patient: Loretta Mo  YOB: 1955  Date of Visit: 4/27/2022      To Whom it May Concern:    Loretta Mo is under my professional care  [place] Billing was seen in my office on 4/27/2022  Malik Billing may return to work on 4/27/22  If you have any questions or concerns, please don't hesitate to call           Sincerely,          Emerick Riedel, MD        CC: No Recipients

## 2022-04-27 NOTE — PROGRESS NOTES
[place] [address] [facility] [address]  Follow Up Visit    Impression/Plan    Mr Jennifer Gustafson is a 78 y o  male diagnosed with TIA in 3/2017  No events since  He is now on Xarelto and aspirin in the setting of atrial flutter  He is on Zetia  He did not tolerate pravastatin  Last A1c was in 2019, last lipid panel in 2021  Form completed today in office supporting continued driving  He is aware of the signs/symptoms of stroke and when to contact 911  Patient Instructions   Return in one year  Diagnoses and all orders for this visit:    History of transient ischemic attack (TIA)    Paroxysmal atrial fibrillation ([facility] [address] )        Subjective    is returning to the [place] [address] [facility] for follow up  He was previously followed by Dr Rivera Simms regarding TIA initially occurring in 3/2017  Additional background his documented in the 6/1/2018 note  History is from the patient and review of prior recording including office notes, imaging reports and hospital records       Interval Events: There have been no concerning neurological events since last visit  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  He continues to work driving a bus for [employer]  He remains on Xarelto  He follows with cardiology       History Reviewed: The following were reviewed and updated as appropriate: allergies, current medications, past medical history,  past surgical history and problem list     Psychiatric History:  None     Social History:   Driving: Yes  Lives Alone: No  Occupation: retired [place]  Drives shuttle bus for [employer]  Objective    /75 (BP Location: Left arm, Patient Position: Sitting, Cuff Size: Adult)   Pulse 66   Temp (!) 96 8 °F (36 °C)   Ht 5' 5" (1 651 m)   Wt 106 kg (233 lb 1 6 oz)   BMI 38 79 kg/m²      General Exam  No acute distress  Neurologic Exam  Mental Status:  Alert and oriented x 3    Language: normal fluency and comprehension  Cranial Nerves:  VFFTC  EOMI, no nystagums  Face symmetric  No dysarthria  Motor:  No drift  Strength 5/5 throughout  Coordination: Finger to nose intact  Gait: Normal casual gait  ROS:    Review of Systems   Constitutional: Negative  Negative for appetite change and fever  HENT: Negative  Negative for hearing loss, tinnitus, trouble swallowing and voice change  Eyes: Negative  Negative for photophobia and pain  Respiratory: Negative  Negative for shortness of breath  Cardiovascular: Negative  Negative for palpitations  Gastrointestinal: Negative  Negative for nausea and vomiting  Endocrine: Negative  Negative for cold intolerance  Genitourinary: Negative  Negative for dysuria, frequency and urgency  Musculoskeletal: Negative  Negative for myalgias and neck pain  Skin: Negative  Negative for rash  Neurological: Negative  Negative for dizziness, tremors, seizures, syncope, facial asymmetry, speech difficulty, weakness, light-headedness, numbness and headaches  Hematological: Negative  Does not bruise/bleed easily  Psychiatric/Behavioral: Negative  Negative for confusion, hallucinations and sleep disturbance  All other systems reviewed and are negative  ROS reviewed and updated as appropriate

## 2022-04-28 ENCOUNTER — OFFICE VISIT (OUTPATIENT)
Dept: INTERNAL MEDICINE CLINIC | Facility: CLINIC | Age: 67
End: 2022-04-28
Payer: COMMERCIAL

## 2022-04-28 VITALS
BODY MASS INDEX: 38.52 KG/M2 | TEMPERATURE: 97.5 F | SYSTOLIC BLOOD PRESSURE: 152 MMHG | OXYGEN SATURATION: 97 % | HEART RATE: 58 BPM | WEIGHT: 231.2 LBS | HEIGHT: 65 IN | RESPIRATION RATE: 16 BRPM | DIASTOLIC BLOOD PRESSURE: 82 MMHG

## 2022-04-28 DIAGNOSIS — I10 PRIMARY HYPERTENSION: ICD-10-CM

## 2022-04-28 DIAGNOSIS — Z23 ENCOUNTER FOR IMMUNIZATION: Primary | ICD-10-CM

## 2022-04-28 DIAGNOSIS — Z12.5 SCREENING PSA (PROSTATE SPECIFIC ANTIGEN): ICD-10-CM

## 2022-04-28 DIAGNOSIS — Z99.89 OSA ON CPAP: ICD-10-CM

## 2022-04-28 DIAGNOSIS — E66.01 CLASS 3 SEVERE OBESITY DUE TO EXCESS CALORIES WITHOUT SERIOUS COMORBIDITY WITH BODY MASS INDEX (BMI) OF 45.0 TO 49.9 IN ADULT (HCC): ICD-10-CM

## 2022-04-28 DIAGNOSIS — E66.01 MORBID OBESITY (HCC): ICD-10-CM

## 2022-04-28 DIAGNOSIS — E78.49 OTHER HYPERLIPIDEMIA: ICD-10-CM

## 2022-04-28 DIAGNOSIS — Z23 NEED FOR SHINGLES VACCINE: ICD-10-CM

## 2022-04-28 DIAGNOSIS — R73.03 PREDIABETES: ICD-10-CM

## 2022-04-28 DIAGNOSIS — G47.33 OSA ON CPAP: ICD-10-CM

## 2022-04-28 PROCEDURE — 99214 OFFICE O/P EST MOD 30 MIN: CPT | Performed by: INTERNAL MEDICINE

## 2022-04-28 PROCEDURE — 3008F BODY MASS INDEX DOCD: CPT | Performed by: PSYCHIATRY & NEUROLOGY

## 2022-04-28 RX ORDER — ZOSTER VACCINE RECOMBINANT, ADJUVANTED 50 MCG/0.5
0.5 KIT INTRAMUSCULAR ONCE
Qty: 1 EACH | Refills: 1 | Status: SHIPPED | OUTPATIENT
Start: 2022-04-28 | End: 2022-04-28

## 2022-04-28 RX ORDER — AMLODIPINE BESYLATE 10 MG/1
10 TABLET ORAL DAILY
Qty: 30 TABLET | Refills: 4 | Status: SHIPPED | OUTPATIENT
Start: 2022-04-28

## 2022-04-28 NOTE — PROGRESS NOTES
Assessment/Plan:    HTN (hypertension)  Suboptimal control will increase amlodipine to 10 milligrams once daily check blood pressure twice daily come back for a blood pressure check in 1 week reduce weight reduce sodium routine walking I did complete his hypertension waiver form for occupational health will update in 1 week I did ask him monitor home readings also    VIRGEN on CPAP  Currently stable doing well recommend weight loss continue with CPAP    Morbid obesity ([address] )  Obesity -I have counseled patient following healthy and balanced diet, I would like the patient to lose weight, I would like the patient exercise routinely; we will continue monitor the patient's progress  Prediabetes  Pre diabetes -I have counseled the patient to follow a healthy balanced diet, I have counseled patient reduce carbohydrates and sweets in the diet, I would like the patient exercise routinely  I will be checking hemoglobin A1c and comprehensive metabolic panel  Have counseled patient about the prevention of diabetes, and the risk of progression to type 2 diabetes      Hyperlipidemia  Hyperlipidemia controlled continue Zetia 10 milligrams once daily not able to tolerate statins secondary to myalgias         Problem List Items Addressed This Visit        Respiratory    VIRGEN on CPAP     Currently stable doing well recommend weight loss continue with CPAP            Cardiovascular and Mediastinum    HTN (hypertension)     Suboptimal control will increase amlodipine to 10 milligrams once daily check blood pressure twice daily come back for a blood pressure check in 1 week reduce weight reduce sodium routine walking I did complete his hypertension waiver form for occupational health will update in 1 week I did ask him monitor home readings also         Relevant Medications    amLODIPine (NORVASC) 10 mg tablet       Other    Morbid obesity ([address] )     Obesity -I have counseled patient following healthy and balanced diet, I would like the patient to lose weight, I would like the patient exercise routinely; we will continue monitor the patient's progress  Prediabetes     Pre diabetes -I have counseled the patient to follow a healthy balanced diet, I have counseled patient reduce carbohydrates and sweets in the diet, I would like the patient exercise routinely  I will be checking hemoglobin A1c and comprehensive metabolic panel  Have counseled patient about the prevention of diabetes, and the risk of progression to type 2 diabetes  Hyperlipidemia     Hyperlipidemia controlled continue Zetia 10 milligrams once daily not able to tolerate statins secondary to myalgias         Relevant Orders    Comprehensive metabolic panel    Lipid Panel with Direct LDL reflex    Screening PSA (prostate specific antigen)    Relevant Orders    PSA, Total Screen      Other Visit Diagnoses     Encounter for immunization    -  Primary    Need for shingles vaccine        Class 3 severe obesity due to excess calories without serious comorbidity with body mass index (BMI) of 45 0 to 49 9 in [facility])        Relevant Orders    Hemoglobin A1C          RTO in 3 months call if any problems blood pressure check in 1-2 weeks  Subjective:      Patient ID: Smith Davis is a 78 y o  male  HPI 69-year old male coming in for a follow up visit regarding primary hypertension, hyperlipidemia, prediabetes, VIRGEN and obesity; The patient reports me compliant taking medications without untoward side effects the  The patient is here to review his medical condition, update me on the medical condition and the patient reports me no hospitalizations and no ER visits    No injuries no illnesses in overall doing very well uses CPAP routinely reports me he needs the blood pressure waiver for occupational health completed today his blood pressure is elevated though; he is trying to follow healthy imbalance diet remains active no major injuries or illnesses    The following portions of the patient's history were reviewed and updated as appropriate: allergies, current medications, past family history, past medical history, past social history, past surgical history and problem list     Review of Systems   Constitutional: Negative for activity change, appetite change and unexpected weight change  HENT: Negative for congestion and postnasal drip  Eyes: Negative for visual disturbance  Respiratory: Negative for cough and shortness of breath  Cardiovascular: Negative for chest pain  Gastrointestinal: Negative for abdominal pain, diarrhea, nausea and vomiting  Neurological: Negative for dizziness, light-headedness and headaches  Objective:    No follow-ups on file  No results found  Allergies   Allergen Reactions    Iodine - Food Allergy Shortness Of Breath     Other reaction(s): Respiratory Distress  Other reaction(s): Respiratory Distress    Omnipaque [Iohexol] Anaphylaxis    Erythromycin Hives     Other reaction(s): Other (See Comments)  Pleurisy   Pleurisy   Other reaction(s):  Other (See Comments)  Pleurisy     Iodixanol     Lisinopril Cough    Statins Myalgia       Past Medical History:   Diagnosis Date    Allergic rhinitis due to pollen     last assessed 10/01/15    Aortic aneurysm (HCC)     4 3 cm  6/2018 last check    Alvares esophagus     CPAP (continuous positive airway pressure) dependence     Diabetes ([address] )     GERD (gastroesophageal reflux disease)     Hyperlipidemia     Hypertension     Irregular heart beat     Mild intermittent asthma without complication     last assessed 10/01/15    Positive PPD     last assesssed 12/21/15    Sleep apnea     Stroke [facility])     unsure if/when happened; no weakness    Syncopal episodes     1 year ago    Vertigo     last assessed 04/24/17     Past Surgical History:   Procedure Laterality Date    CARDIAC CATHETERIZATION      COLONOSCOPY N/A 11/14/2018    Procedure: COLONOSCOPY;  Surgeon: Deepak Stephens Anne Marie Villa MD;  Location:  GI LAB; Service: Colorectal    EGD AND COLONOSCOPY      ENDOSCOPIC ULTRASOUND (LOWER)  01/23/2020    HERNIA REPAIR      JOINT REPLACEMENT Left     knee    MULTIPLE TOOTH EXTRACTIONS  05/2020    TOTAL KNEE ARTHROPLASTY  08/29/2016    Dr Diann Matthews 07/28/16    UPPER GASTROINTESTINAL ENDOSCOPY  10/21/2019    URETHRA SURGERY      polyps removed     Current Outpatient Medications on File Prior to Visit   Medication Sig Dispense Refill    calcium carbonate (TUMS) 500 mg chewable tablet Chew 2 tablets every 4 (four) hours as needed       CHOLECALCIFEROL PO Take 2,000 Units by mouth Chew form      Coenzyme Q10 (COQ-10) 100 MG CAPS Take 100 mg by mouth daily       DENTA 5000 PLUS 1 1 % CREA BRUSH TWICE DAILY  AFTER PM BRUSHING EXPECTORATE BUT DO NOT RINSE      ezetimibe (ZETIA) 10 mg tablet TAKE 1 TABLET BY MOUTH EVERY DAY 90 tablet 3    furosemide (LASIX) 20 mg tablet TAKE 1 TABLET (20 MG TOTAL) BY MOUTH DAILY   90 tablet 3    Klor-Con M10 10 MEQ tablet TAKE 2 TABLETS BY MOUTH DAILY 180 tablet 3    multivitamin (THERAGRAN) TABS Take 1 tablet by mouth Gummy form      Omega-3 Fatty Acids (FISH OIL PO) Take 1 capsule by mouth daily Gummy form      pantoprazole (PROTONIX) 20 mg tablet Take 1 tablet (20 mg total) by mouth daily 90 tablet 1    sotalol (BETAPACE) 80 mg tablet Take 20 mg by mouth daily        valsartan (DIOVAN) 320 MG tablet TAKE 1 TABLET BY MOUTH EVERY DAY 90 tablet 1    XARELTO 20 MG tablet TAKE 1 TABLET BY MOUTH ONCE NDAILY WITH DINNER 30 tablet 0    aspirin 81 MG tablet Take 81 mg by mouth  (Patient not taking: Reported on 4/28/2022 )      cimetidine (TAGAMET) 200 mg tablet Take 200 mg by mouth daily   (Patient not taking: Reported on 4/28/2022 )      fluticasone (FLONASE) 50 mcg/act nasal spray 2 sprays into each nostril daily as needed  (Patient not taking: Reported on 4/28/2022 )      lidocaine (LIDODERM) 5 % Place 1 patch on the skin every 24 hours  methylPREDNISolone 4 MG tablet therapy pack Use as directed on package (Patient not taking: Reported on 4/28/2022 ) 21 each 0    ofloxacin (OCUFLOX) 0 3 % ophthalmic solution Administer 1 drop into the left eye 4 (four) times a day X 5 days (Patient not taking: Reported on 4/28/2022 ) 5 mL 0     No current facility-administered medications on file prior to visit       Family History   Problem Relation Age of Onset    Heart disease Mother     Hypertension Mother     Coronary artery disease Father     Hypertension Father     Heart disease Father         cardiac disorder    Stomach cancer Maternal Grandmother     Thyroid disease Neg Hx      Social History     Socioeconomic History    Marital status: /Civil Union     Spouse name: Not on file    Number of children: 2    Years of education: Not on file    Highest education level: Not on file   Occupational History    Not on file   Tobacco Use    Smoking status: Never Smoker    Smokeless tobacco: Never Used   Vaping Use    Vaping Use: Never used   Substance and Sexual Activity    Alcohol use: Never    Drug use: No    Sexual activity: Yes   Other Topics Concern    Not on file   Social History Narrative    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[employer] [address] Strain: Not on file   Food Insecurity: Not on file   Transportation Needs: Not on file   Physical Activity: Not on file   Stress: Not on file   Social Connections: Not on file   Intimate Partner Violence: Not on file   Housing Stability: Not on file     Vitals:    04/28/22 1456   BP: 152/82   Pulse: 58   Resp: 16   Temp: 97 5 °F (36 4 °C)   SpO2: 97%   Weight: 105 kg (231 lb 3 2 oz)   Height: 5' 5" (1 651 m)     Results for orders placed or performed during the hospital encounter of 12/15/21   Tissue Exam   Result Value Ref Range    Case Report       Surgical Pathology Report                         Case: [id number] Authorizing Provider:  Abad Johnson MD             Collected:           12/15/2021 1411              Ordering Location:     [address] [place] [address]      Received:            12/15/2021 9208                                     [facility]                                                           Pathologist:           Trey Winkler DO                                                     Specimens:   A) - Esophagogastric junction, h/o haider's                                                        B) - Stomach, gastritis                                                                    Final Diagnosis       A  Esophagogastric Junction, Biopsy:  - Squamous and cardiac-type gastric mucosa with nonspecific chronic inflammation and reactive changes  - Negative for intestinal metaplasia and dysplasia  B  Stomach, Biopsy:  - Oxyntic- and antral-type gastric mucosa with mild chronic inactive gastritis  - Negative for H  pylori organisms on H&E stain  Note       The endoscopic report was reviewed  Additional Information       All reported additional testing was performed with appropriately reactive controls  These tests were developed and their performance characteristics determined by [facility] or appropriate performing facility, though some tests may be performed on tissues which have not been validated for performance characteristics (such as staining performed on alcohol exposed cell blocks and decalcified tissues)  Results should be interpreted with caution and in the context of the patients clinical condition  These tests may not be cleared or approved by the [place]  Food and Drug Administration, though the FDA has determined that such clearance or approval is not necessary  These tests are used for clinical purposes and they should not be regarded as investigational or for research   This laboratory has been approved by CLIA 88, designated as a high-complexity laboratory and is qualified to perform these tests  Interpretation performed at [facility], [address] [id number]      Gross Description          A  The specimen is received in formalin, labeled with the patient's name and hospital number, and is designated "esophagogastric junction, history of haider's "  The specimen consists of 4 tan flat and elongated soft tissue fragments measuring 0 2-0 8 cm in greatest dimension  The specimen is entirely submitted in a screened cassette  B  The specimen is received in formalin, labeled with the patient's name and hospital number, and is designated "stomach gastritis biopsy  The specimen consists of 3 tan flat and elongated soft tissue fragments measuring 0 1-0 9 cm in greatest dimension  The specimen is entirely submitted in a screened cassette  Note: The estimated total formalin fixation time based upon information provided by the submitting clinician and the standard processing schedule is under 72 hours  VAlvarez              Weight (last 2 days)     None        Body mass index is 38 47 kg/m²  BP      Temp      Pulse     Resp      SpO2        Vitals:    04/28/22 1456   Weight: 105 kg (231 lb 3 2 oz)     Vitals:    04/28/22 1456   Weight: 105 kg (231 lb 3 2 oz)       /82   Pulse 58   Temp 97 5 °F (36 4 °C)   Resp 16   Ht 5' 5" (1 651 m)   Wt 105 kg (231 lb 3 2 oz)   SpO2 97%   BMI 38 47 kg/m²          Physical Exam  Constitutional:       General: He is not in acute distress  Appearance: He is well-developed  He is not diaphoretic  HENT:      Head: Normocephalic and atraumatic  Right Ear: External ear normal       Left Ear: External ear normal    Eyes:      General: No scleral icterus  Right eye: No discharge  Left eye: No discharge  Conjunctiva/sclera: Conjunctivae normal       Pupils: Pupils are equal, round, and reactive to light     Cardiovascular:      Rate and Rhythm: Normal rate and regular rhythm  Heart sounds: Normal heart sounds  No murmur heard  No friction rub  No gallop  Pulmonary:      Effort: No respiratory distress  Breath sounds: No wheezing or rales  Abdominal:      General: Bowel sounds are normal  There is no distension  Palpations: Abdomen is soft  There is no mass  Tenderness: There is no abdominal tenderness  There is no guarding or rebound  Musculoskeletal:         General: No deformity  Cervical back: Neck supple  Lymphadenopathy:      Cervical: No cervical adenopathy  Neurological:      Mental Status: He is alert

## 2022-05-01 NOTE — ASSESSMENT & PLAN NOTE
Hyperlipidemia controlled continue Zetia 10 milligrams once daily not able to tolerate statins secondary to myalgias

## 2022-05-01 NOTE — ASSESSMENT & PLAN NOTE
Suboptimal control will increase amlodipine to 10 milligrams once daily check blood pressure twice daily come back for a blood pressure check in 1 week reduce weight reduce sodium routine walking I did complete his hypertension waiver form for occupational health will update in 1 week I did ask him monitor home readings also

## 2022-05-07 DIAGNOSIS — K22.710 BARRETT'S ESOPHAGUS WITH LOW GRADE DYSPLASIA: ICD-10-CM

## 2022-05-08 RX ORDER — PANTOPRAZOLE SODIUM 20 MG/1
TABLET, DELAYED RELEASE ORAL
Qty: 90 TABLET | Refills: 1 | Status: SHIPPED | OUTPATIENT
Start: 2022-05-08

## 2022-05-23 ENCOUNTER — CLINICAL SUPPORT (OUTPATIENT)
Dept: INTERNAL MEDICINE CLINIC | Facility: CLINIC | Age: 67
End: 2022-05-23

## 2022-05-23 VITALS — SYSTOLIC BLOOD PRESSURE: 142 MMHG | DIASTOLIC BLOOD PRESSURE: 80 MMHG

## 2022-05-23 DIAGNOSIS — I10 PRIMARY HYPERTENSION: Primary | ICD-10-CM

## 2022-05-23 NOTE — PROGRESS NOTES
Pt brought in home readings  They will be scanned into the chart by the fax team  Ely Mercado can view the scanned BP readings in the "Media" tab of the pt's chart

## 2022-05-23 NOTE — PROGRESS NOTES
Pt came into the office for a blood pressure check, as per last visit he needs it for occupational health  Each BP checks were taken a few minutes apart  Informed pt we would let you know the readings

## 2022-05-23 NOTE — PROGRESS NOTES
Pt brought in home readings  They will be scanned into the chart by the fax team  Merilyn Lanes can view the scanned BP readings in the "Media" tab of the pt's chart

## 2022-05-24 ENCOUNTER — OFFICE VISIT (OUTPATIENT)
Dept: SLEEP CENTER | Facility: CLINIC | Age: 67
End: 2022-05-24
Payer: COMMERCIAL

## 2022-05-24 VITALS
OXYGEN SATURATION: 95 % | HEIGHT: 65 IN | DIASTOLIC BLOOD PRESSURE: 80 MMHG | SYSTOLIC BLOOD PRESSURE: 140 MMHG | BODY MASS INDEX: 38.62 KG/M2 | HEART RATE: 51 BPM | WEIGHT: 231.8 LBS

## 2022-05-24 DIAGNOSIS — G47.33 OSA (OBSTRUCTIVE SLEEP APNEA): Primary | ICD-10-CM

## 2022-05-24 PROCEDURE — 1160F RVW MEDS BY RX/DR IN RCRD: CPT | Performed by: INTERNAL MEDICINE

## 2022-05-24 PROCEDURE — 3008F BODY MASS INDEX DOCD: CPT | Performed by: INTERNAL MEDICINE

## 2022-05-24 PROCEDURE — 3077F SYST BP >= 140 MM HG: CPT | Performed by: INTERNAL MEDICINE

## 2022-05-24 PROCEDURE — 3079F DIAST BP 80-89 MM HG: CPT | Performed by: INTERNAL MEDICINE

## 2022-05-24 PROCEDURE — 1036F TOBACCO NON-USER: CPT | Performed by: INTERNAL MEDICINE

## 2022-05-24 PROCEDURE — 99213 OFFICE O/P EST LOW 20 MIN: CPT | Performed by: INTERNAL MEDICINE

## 2022-05-24 NOTE — PROGRESS NOTES
Progress Note - Sleep Center   Jess Sharp GTC:7/14/1840 MRN: [id number]      Reason for Visit:  78 y o male here for annual follow-up    Assessment:  Doing well on current therapy of CPAP 10 cm for previously diagnosed obstructive sleep apnea  Plan:  Continue same    Follow up: One year    History of Present Illness:  History of VIRGEN on PAP therapy  Fully compliant and deriving benefit  Review of Systems      Genitourinary none   Cardiology none   Gastrointestinal none   Neurology none   Constitutional none   Integumentary none   Psychiatry none   Musculoskeletal none   Pulmonary none   ENT none   Endocrine none   Hematological none       I have reviewed and updated the review of systems as necessary      Historical Information    Past Medical History:   Past Medical History:   Diagnosis Date    Allergic rhinitis due to pollen     last assessed 10/01/15    [facility] )     4 3 cm  6/2018 last check    Alvares esophagus     CPAP (continuous positive airway pressure) dependence     Diabetes ([facility] )     GERD (gastroesophageal reflux disease)     Hyperlipidemia     Hypertension     Irregular heart beat     Mild intermittent asthma without complication     last assessed 10/01/15    Positive PPD     last assesssed 12/21/15    Sleep apnea     Stroke [facility])     unsure if/when happened; no weakness    Syncopal episodes     1 year ago    Vertigo     last assessed 04/24/17         Past Surgical History:   Past Surgical History:   Procedure Laterality Date    CARDIAC CATHETERIZATION      COLONOSCOPY N/A 11/14/2018    Procedure: COLONOSCOPY;  Surgeon: Ezequiel Guerin MD;  Location: [facility];   Service: Colorectal    EGD AND COLONOSCOPY      ENDOSCOPIC ULTRASOUND (LOWER)  01/23/2020    HERNIA REPAIR      JOINT REPLACEMENT Left     knee    MULTIPLE TOOTH EXTRACTIONS  05/2020    TOTAL KNEE ARTHROPLASTY  08/29/2016    Dr Johnson Clark 07/28/16    UPPER GASTROINTESTINAL ENDOSCOPY  10/21/2019    URETHRA SURGERY      polyps removed       Social History:   Social History     Socioeconomic History    Marital status: /Civil Union     Spouse name: Not on file    Number of children: 2    Years of education: Not on file    Highest education level: Not on file   Occupational History    Not on file   Tobacco Use    Smoking status: Never Smoker    Smokeless tobacco: Never Used   Vaping Use    Vaping Use: Never used   Substance and Sexual Activity    Alcohol use: Never    Drug use: No    Sexual activity: Yes   Other Topics Concern    Not on file   Social History Narrative    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[address] Strain: Not on file   Food Insecurity: Not on file   Transportation Needs: Not on file   Physical Activity: Not on file   Stress: Not on file   Social Connections: Not on file   Intimate Partner Violence: Not on file   Housing Stability: Not on file       Family History:   Family History   Problem Relation Age of Onset    Heart disease Mother     Hypertension Mother     Coronary artery disease Father     Hypertension Father     Heart disease Father         cardiac disorder    Stomach cancer Maternal Grandmother     Thyroid disease Neg Hx        Medications/Allergies:      Current Outpatient Medications:     amLODIPine (NORVASC) 10 mg tablet, Take 1 tablet (10 mg total) by mouth daily, Disp: 30 tablet, Rfl: 4    calcium carbonate (TUMS) 500 mg chewable tablet, Chew 2 tablets every 4 (four) hours as needed , Disp: , Rfl:     Coenzyme Q10 (COQ-10) 100 MG CAPS, Take 100 mg by mouth daily , Disp: , Rfl:     ezetimibe (ZETIA) 10 mg tablet, TAKE 1 TABLET BY MOUTH EVERY DAY, Disp: 90 tablet, Rfl: 3    furosemide (LASIX) 20 mg tablet, TAKE 1 TABLET (20 MG TOTAL) BY MOUTH DAILY  , Disp: 90 tablet, Rfl: 3    Klor-Con M10 10 MEQ tablet, TAKE 2 TABLETS BY MOUTH DAILY, Disp: 180 tablet, Rfl: 3    multivitamin (THERAGRAN) TABS, Take 1 tablet by mouth Gummy form, Disp: , Rfl:     Omega-3 Fatty Acids (FISH OIL PO), Take 1 capsule by mouth daily Gummy form, Disp: , Rfl:     pantoprazole (PROTONIX) 20 mg tablet, TAKE 1 TABLET BY MOUTH ONCE DAILY, Disp: 90 tablet, Rfl: 1    sotalol (BETAPACE) 80 mg tablet, Take 20 mg by mouth daily  , Disp: , Rfl:     valsartan (DIOVAN) 320 MG tablet, TAKE 1 TABLET BY MOUTH EVERY DAY, Disp: 90 tablet, Rfl: 1    XARELTO 20 MG tablet, TAKE 1 TABLET BY MOUTH ONCE NDAILY WITH DINNER, Disp: 30 tablet, Rfl: 0    aspirin 81 MG tablet, Take 81 mg by mouth  (Patient not taking: No sig reported), Disp: , Rfl:     CHOLECALCIFEROL PO, Take 2,000 Units by mouth Chew form, Disp: , Rfl:     cimetidine (TAGAMET) 200 mg tablet, Take 200 mg by mouth daily   (Patient not taking: Reported on 4/28/2022 ), Disp: , Rfl:     DENTA 5000 PLUS 1 1 % CREA, BRUSH TWICE DAILY  AFTER PM BRUSHING EXPECTORATE BUT DO NOT RINSE (Patient not taking: Reported on 5/24/2022), Disp: , Rfl:     fluticasone (FLONASE) 50 mcg/act nasal spray, 2 sprays into each nostril daily as needed  (Patient not taking: Reported on 4/28/2022 ), Disp: , Rfl:     lidocaine (LIDODERM) 5 %, Place 1 patch on the skin every 24 hours, Disp: , Rfl:     methylPREDNISolone 4 MG tablet therapy pack, Use as directed on package (Patient not taking: Reported on 4/28/2022 ), Disp: 21 each, Rfl: 0    ofloxacin (OCUFLOX) 0 3 % ophthalmic solution, Administer 1 drop into the left eye 4 (four) times a day X 5 days (Patient not taking: Reported on 4/28/2022 ), Disp: 5 mL, Rfl: 0          Objective      Vital Signs:   Vitals:    05/24/22 1019   BP: 140/80   Pulse: (!) 51   SpO2: 95%     [place] Sleepiness Scale: Total score: 0        Physical Exam:    General: Alert, appropriate, cooperative, overweight    Head: NC/AT    Skin: Warm, dry    Neuro: No motor abnormalities, cranial nerves appear intact    Extremity: No clubbing, cyanosis      DME Provider:   [employer]        Counseling / Coordination of Care   I have spent 15 minutes with the patient today in which greater than 50% of this time was spent in counseling/coordination of care regarding: equipment and compliance  Board Certified Sleep Specialist    Portions of the record may have been created with voice recognition software  Occasional wrong word or "sound a like" substitutions may have occurred due to the inherent limitations of voice recognition software  Read the chart carefully and recognize, using context, where substitutions have occurred

## 2022-05-31 ENCOUNTER — TELEPHONE (OUTPATIENT)
Dept: SLEEP CENTER | Facility: CLINIC | Age: 67
End: 2022-05-31

## 2022-05-31 NOTE — TELEPHONE ENCOUNTER
Rx for PAP resupply sent to [facility] via [facility]

## 2022-06-09 ENCOUNTER — TELEPHONE (OUTPATIENT)
Dept: INTERNAL MEDICINE CLINIC | Facility: CLINIC | Age: 67
End: 2022-06-09

## 2022-06-09 NOTE — TELEPHONE ENCOUNTER
----- Message from Karolina Metzger DO sent at 6/5/2022  5:48 AM EDT -----  Please notify the patient the blood pressures are in the normal range  ----- Message -----  From: Evaristo Dow MA  Sent: 5/23/2022   2:53 PM EDT  To: Karolina Metzger DO        ----- Message -----  From: Richard Hay LPN  Sent: 7/88/4149   1:44 PM EDT  To: [facility]      ----- Message -----  From: Karolina Metzger DO  Sent: 5/23/2022   1:24 PM EDT  To: Richard Hay LPN    Blood pressure is mildly elevated what are the home readings running  ----- Message -----  From: Richard Hay LPN  Sent: 2/92/3332   1:01 PM EDT  To: Karolina Metzger DO    Please see documentation in chart under vitals      ----- Message -----  From: Karolina Metzger DO  Sent: 5/23/2022  12:37 PM EDT  To: [facility]    What are the current readings  ----- Message -----  From: Evaristo Dow MA  Sent: 5/23/2022   9:54 AM EDT  To: Karolina Metzger DO

## 2022-06-09 NOTE — TELEPHONE ENCOUNTER
Per Dr Champion, home BP readings are in the normal range  Pt advised and pt verbalized understanding

## 2022-06-09 NOTE — PROGRESS NOTES
Spoke with pt and advised per Dr Champion "Blood pressures are in the normal range"  Pt verbalized understanding

## 2022-07-20 ENCOUNTER — OFFICE VISIT (OUTPATIENT)
Dept: URGENT CARE | Age: 67
End: 2022-07-20
Payer: COMMERCIAL

## 2022-07-20 VITALS
SYSTOLIC BLOOD PRESSURE: 120 MMHG | OXYGEN SATURATION: 97 % | RESPIRATION RATE: 18 BRPM | DIASTOLIC BLOOD PRESSURE: 70 MMHG | TEMPERATURE: 97 F | HEART RATE: 63 BPM

## 2022-07-20 DIAGNOSIS — H10.9 CONJUNCTIVITIS OF LEFT EYE, UNSPECIFIED CONJUNCTIVITIS TYPE: Primary | ICD-10-CM

## 2022-07-20 PROCEDURE — 99213 OFFICE O/P EST LOW 20 MIN: CPT | Performed by: STUDENT IN AN ORGANIZED HEALTH CARE EDUCATION/TRAINING PROGRAM

## 2022-07-20 PROCEDURE — S9083 URGENT CARE CENTER GLOBAL: HCPCS | Performed by: STUDENT IN AN ORGANIZED HEALTH CARE EDUCATION/TRAINING PROGRAM

## 2022-07-20 RX ORDER — OFLOXACIN 3 MG/ML
1 SOLUTION/ DROPS OPHTHALMIC 4 TIMES DAILY
Qty: 5 ML | Refills: 0 | Status: SHIPPED | OUTPATIENT
Start: 2022-07-20

## 2022-07-20 NOTE — PROGRESS NOTES
[facility]        NAME: Virginia Runner is a 75 y o  male  : 1955    MRN: [id number]  DATE: 2022  TIME: 9:25 AM    Assessment and Plan   Conjunctivitis of left eye, unspecified conjunctivitis type [H10 9]  1  Conjunctivitis of left eye, unspecified conjunctivitis type  ofloxacin (OCUFLOX) 0 3 % ophthalmic solution         Patient Instructions       Follow up with PCP in 3-5 days  Proceed to  ER if symptoms worsen  Chief Complaint     Chief Complaint   Patient presents with    Eye Problem     C/O left eye pink x Monday, after swimming, c/o light sensitivity          History of Present Illness       HPI   Since today complaining of left eye redness pain and photophobia ongoing since Monday, patient was wearing contacts and has went swimming  Patient states that he does not have any blurry vision, but is having discharged crusting in the morning  Review of Systems   Review of Systems  Per hpi     Current Medications       Current Outpatient Medications:     ofloxacin (OCUFLOX) 0 3 % ophthalmic solution, Administer 1 drop into the left eye 4 (four) times a day, Disp: 5 mL, Rfl: 0    amLODIPine (NORVASC) 10 mg tablet, Take 1 tablet (10 mg total) by mouth daily, Disp: 30 tablet, Rfl: 4    aspirin 81 MG tablet, Take 81 mg by mouth  (Patient not taking: No sig reported), Disp: , Rfl:     calcium carbonate (TUMS) 500 mg chewable tablet, Chew 2 tablets every 4 (four) hours as needed , Disp: , Rfl:     CHOLECALCIFEROL PO, Take 2,000 Units by mouth Chew form, Disp: , Rfl:     cimetidine (TAGAMET) 200 mg tablet, Take 200 mg by mouth daily   (Patient not taking: Reported on 2022 ), Disp: , Rfl:     Coenzyme Q10 (COQ-10) 100 MG CAPS, Take 100 mg by mouth daily , Disp: , Rfl:     DENTA 5000 PLUS 1 1 % CREA, BRUSH TWICE DAILY   AFTER PM BRUSHING EXPECTORATE BUT DO NOT RINSE (Patient not taking: Reported on 2022), Disp: , Rfl:     ezetimibe (ZETIA) 10 mg tablet, TAKE 1 TABLET BY MOUTH EVERY DAY, Disp: 90 tablet, Rfl: 3    fluticasone (FLONASE) 50 mcg/act nasal spray, 2 sprays into each nostril daily as needed  (Patient not taking: Reported on 4/28/2022 ), Disp: , Rfl:     furosemide (LASIX) 20 mg tablet, TAKE 1 TABLET (20 MG TOTAL) BY MOUTH DAILY  , Disp: 90 tablet, Rfl: 3    Klor-Con M10 10 MEQ tablet, TAKE 2 TABLETS BY MOUTH DAILY, Disp: 180 tablet, Rfl: 3    lidocaine (LIDODERM) 5 %, Place 1 patch on the skin every 24 hours, Disp: , Rfl:     methylPREDNISolone 4 MG tablet therapy pack, Use as directed on package (Patient not taking: Reported on 4/28/2022 ), Disp: 21 each, Rfl: 0    multivitamin (THERAGRAN) TABS, Take 1 tablet by mouth Gummy form, Disp: , Rfl:     ofloxacin (OCUFLOX) 0 3 % ophthalmic solution, Administer 1 drop into the left eye 4 (four) times a day X 5 days (Patient not taking: Reported on 4/28/2022 ), Disp: 5 mL, Rfl: 0    Omega-3 Fatty Acids (FISH OIL PO), Take 1 capsule by mouth daily Gummy form, Disp: , Rfl:     pantoprazole (PROTONIX) 20 mg tablet, TAKE 1 TABLET BY MOUTH ONCE DAILY, Disp: 90 tablet, Rfl: 1    sotalol (BETAPACE) 80 mg tablet, Take 20 mg by mouth daily  , Disp: , Rfl:     valsartan (DIOVAN) 320 MG tablet, TAKE 1 TABLET BY MOUTH EVERY DAY, Disp: 90 tablet, Rfl: 1    XARELTO 20 MG tablet, TAKE 1 TABLET BY MOUTH ONCE NDAILY WITH DINNER, Disp: 30 tablet, Rfl: 0    Current Allergies     Allergies as of 07/20/2022 - Reviewed 07/20/2022   Allergen Reaction Noted    Iodine - food allergy Shortness Of Breath 09/10/2011    Omnipaque [iohexol] Anaphylaxis 03/13/2017    Erythromycin Hives 09/10/2011    Iodixanol  12/12/2019    Lisinopril Cough 08/31/2015    Statins Myalgia 09/11/2015            The following portions of the patient's history were reviewed and updated as appropriate: allergies, current medications, past family history, past medical history, past social history, past surgical history and problem list      Past Medical History: Diagnosis Date    Allergic rhinitis due to pollen     last assessed 10/01/15    Aortic aneurysm (HCC)     4 3 cm  6/2018 last check    Alvares esophagus     CPAP (continuous positive airway pressure) dependence     Diabetes ([address] )     GERD (gastroesophageal reflux disease)     Hyperlipidemia     Hypertension     Irregular heart beat     Mild intermittent asthma without complication     last assessed 10/01/15    Positive PPD     last assesssed 12/21/15    Sleep apnea     Stroke [facility])     unsure if/when happened; no weakness    Syncopal episodes     1 year ago    Vertigo     last assessed 04/24/17       Past Surgical History:   Procedure Laterality Date    CARDIAC CATHETERIZATION      COLONOSCOPY N/A 11/14/2018    Procedure: COLONOSCOPY;  Surgeon: Junior Vines MD;  Location: [facility]; Service: Colorectal    EGD AND COLONOSCOPY      ENDOSCOPIC ULTRASOUND (LOWER)  01/23/2020    HERNIA REPAIR      JOINT REPLACEMENT Left     knee    MULTIPLE TOOTH EXTRACTIONS  05/2020    TOTAL KNEE ARTHROPLASTY  08/29/2016    Dr Candance Horner 07/28/16    UPPER GASTROINTESTINAL ENDOSCOPY  10/21/2019    URETHRA SURGERY      polyps removed       Family History   Problem Relation Age of Onset    Heart disease Mother     Hypertension Mother     Coronary artery disease Father     Hypertension Father     Heart disease Father         cardiac disorder    Stomach cancer Maternal Grandmother     Thyroid disease Neg Hx          Medications have been verified  Objective   /70   Pulse 63   Temp (!) 97 °F (36 1 °C)   Resp 18   SpO2 97%   No LMP for male patient  Physical Exam     Physical Exam  Constitutional:       General: He is not in acute distress  Appearance: He is well-developed  He is not diaphoretic  HENT:      Head: Normocephalic and atraumatic        Right Ear: Tympanic membrane and external ear normal       Left Ear: Tympanic membrane and external ear normal       Mouth/Throat: Mouth: Mucous membranes are moist       Pharynx: Oropharynx is clear  No oropharyngeal exudate  Eyes:      General:         Left eye: Discharge present  Extraocular Movements: Extraocular movements intact  Comments: Conjunctival injection of left eye   Cardiovascular:      Rate and Rhythm: Normal rate and regular rhythm  Heart sounds: Normal heart sounds  Pulmonary:      Effort: Pulmonary effort is normal  No respiratory distress  Breath sounds: Normal breath sounds  No wheezing  Abdominal:      General: Bowel sounds are normal  There is no distension  Palpations: Abdomen is soft  There is no mass  Tenderness: There is no abdominal tenderness  Musculoskeletal:         General: No swelling or tenderness  Cervical back: Normal range of motion  Right lower leg: No edema  Left lower leg: No edema  Lymphadenopathy:      Cervical: No cervical adenopathy  Skin:     General: Skin is warm  Neurological:      Mental Status: He is alert and oriented to person, place, and time

## 2022-08-06 DIAGNOSIS — I10 ESSENTIAL HYPERTENSION: ICD-10-CM

## 2022-08-08 RX ORDER — VALSARTAN 320 MG/1
TABLET ORAL
Qty: 90 TABLET | Refills: 1 | Status: SHIPPED | OUTPATIENT
Start: 2022-08-08

## 2022-08-24 DIAGNOSIS — I10 PRIMARY HYPERTENSION: ICD-10-CM

## 2022-08-24 RX ORDER — AMLODIPINE BESYLATE 10 MG/1
TABLET ORAL
Qty: 90 TABLET | Refills: 1 | Status: SHIPPED | OUTPATIENT
Start: 2022-08-24

## 2022-09-07 ENCOUNTER — HOSPITAL ENCOUNTER (EMERGENCY)
Facility: HOSPITAL | Age: 67
Discharge: HOME | End: 2022-09-08
Attending: EMERGENCY MEDICINE
Payer: COMMERCIAL

## 2022-09-07 VITALS
HEART RATE: 66 BPM | OXYGEN SATURATION: 98 % | RESPIRATION RATE: 18 BRPM | SYSTOLIC BLOOD PRESSURE: 148 MMHG | TEMPERATURE: 98.7 F | DIASTOLIC BLOOD PRESSURE: 76 MMHG

## 2022-09-07 DIAGNOSIS — M79.604 RIGHT LEG PAIN: Primary | ICD-10-CM

## 2022-09-07 DIAGNOSIS — M71.20 BAKER'S CYST: ICD-10-CM

## 2022-09-07 PROCEDURE — 99284 EMERGENCY DEPT VISIT MOD MDM: CPT | Performed by: EMERGENCY MEDICINE

## 2022-09-07 PROCEDURE — 99283 EMERGENCY DEPT VISIT LOW MDM: CPT

## 2022-09-07 RX ORDER — IBUPROFEN 600 MG/1
600 TABLET ORAL ONCE
Status: DISCONTINUED | OUTPATIENT
Start: 2022-09-07 | End: 2022-09-08 | Stop reason: HOSPADM

## 2022-09-07 NOTE — Clinical Note
Aimee Martin was seen and treated in our emergency department on 9/7/2022  Diagnosis:     Philippe Vásquez  may return to work on return date  He may return on this date: 09/09/2022         If you have any questions or concerns, please don't hesitate to call        Chiqui Weber MD    ______________________________           _______________          _______________  Hospital Representative                              Date                                Time

## 2022-09-08 ENCOUNTER — OFFICE VISIT (OUTPATIENT)
Dept: INTERNAL MEDICINE CLINIC | Facility: CLINIC | Age: 67
End: 2022-09-08
Payer: COMMERCIAL

## 2022-09-08 ENCOUNTER — TELEPHONE (OUTPATIENT)
Dept: OBGYN CLINIC | Facility: HOSPITAL | Age: 67
End: 2022-09-08

## 2022-09-08 VITALS
BODY MASS INDEX: 38.02 KG/M2 | SYSTOLIC BLOOD PRESSURE: 118 MMHG | WEIGHT: 228.2 LBS | HEART RATE: 53 BPM | DIASTOLIC BLOOD PRESSURE: 72 MMHG | OXYGEN SATURATION: 96 % | HEIGHT: 65 IN

## 2022-09-08 DIAGNOSIS — M66.0 RUPTURED BAKERS CYST: Primary | ICD-10-CM

## 2022-09-08 DIAGNOSIS — M79.604 RIGHT LEG PAIN: ICD-10-CM

## 2022-09-08 PROCEDURE — 99213 OFFICE O/P EST LOW 20 MIN: CPT | Performed by: NURSE PRACTITIONER

## 2022-09-08 PROCEDURE — 3074F SYST BP LT 130 MM HG: CPT | Performed by: NURSE PRACTITIONER

## 2022-09-08 PROCEDURE — 3725F SCREEN DEPRESSION PERFORMED: CPT | Performed by: NURSE PRACTITIONER

## 2022-09-08 PROCEDURE — 1160F RVW MEDS BY RX/DR IN RCRD: CPT | Performed by: NURSE PRACTITIONER

## 2022-09-08 PROCEDURE — 3078F DIAST BP <80 MM HG: CPT | Performed by: NURSE PRACTITIONER

## 2022-09-08 NOTE — ED ATTENDING ATTESTATION
9/7/2022  Flip Hernandez DO, saw and evaluated the patient  I have discussed the patient with the resident/non-physician practitioner and agree with the resident's/non-physician practitioner's findings, Plan of Care, and MDM as documented in the resident's/non-physician practitioner's note, except where noted  All available labs and Radiology studies were reviewed  I was present for key portions of any procedure(s) performed by the resident/non-physician practitioner and I was immediately available to provide assistance  At this point I agree with the current assessment done in the Emergency Department  I have conducted an independent evaluation of this patient a history and physical is as follows:      71-year-old male who is employed as a  presents for evaluation of right posterior knee pain radiating into his calf which is worse when area is palpated and when 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  States that it started today when he stepped down he did have any pain when he stepped however when he went to step off to move forward that is when the pain started  No direct trauma to the area  Patient is on rivaroxaban chronically  Denies focal weakness, numbness, or tingling  On exam the right calf is slightly enlarged compared to the left there is tenderness to palpation from the popliteal fossa down the medial aspect of calf to about the mid point  No skin changes  Skin is intact  +EHL/FHL  Sensation intact to light touch L3-S2 bilaterally  Point of care ultrasound reveals large cystic structure originating from the knee joint extending down into the medial calf  There is no flow within the structure  There is 1 area with complex echoes  Patient has tenderness when the ultrasound probe is over the cystic structure    Popliteal vein is easily compressible peroneal and tibial veins at the trifurcation are easily compressible as well     Impression:  Large cystic structure in the popliteal fossa likely representing a Baker's cyst low concern for DVT clinically as well as by ultrasound plan: Ace wrap, anti-inflammatories, follow-up with Orthopedic surgery for definitive treatment      ED Course         Critical Care Time  Procedures

## 2022-09-08 NOTE — DISCHARGE INSTRUCTIONS
Your workup here was not concerning for anything dangerous  Therefore there is no need for you to stay at the hospital for further testing  We feel safe to send you home  You can use Tylenol and Motrin for management of your symptoms  Use compression wrappings    You should follow up with an orthopedist to assess for resolution of your symptoms and to determine if there is further evaluation that needs to be performed      Return to the emergency department if you have any symptoms of worsening pain or inability to walk

## 2022-09-08 NOTE — LETTER
September 8, 2022     Patient: Guru Thomas  YOB: 1955  Date of Visit: 9/8/2022      To Whom it May Concern:    Guru Thomas is under my professional care  Mollyreyna Hightower was seen in my office on 9/8/2022  Molly Hightower may return to work on 9/12/22  If you have any questions or concerns, please don't hesitate to call           Sincerely,          LUIS ENRIQUE Roldan        CC: No Recipients

## 2022-09-08 NOTE — PROGRESS NOTES
Assessment/Plan:    1  Ruptured Bakers cyst    2  Right leg pain    The case discussed with patient using patient centered shared decision making  The patient was counseled regarding instructions for management,-- risk factor reductions,-- prognosis,-- impressions,-- risks and benefits of treatment options,-- importance of compliance with treatment  I have reviewed the instructions with the patient, answering all questions to his satisfaction  1  Ruptured right Corbin's cyst diagnosed at [facility] ED last night  Problem has stablized     1  Relative rest-no work until Monday  Job duties may aggrevate  2  Restorative care-elevate, compression stockings, ice  3  Ortho follow up    F/u prn        There are no Patient Instructions on file for this visit  No follow-ups on file  Subjective:      Patient ID: John Carbone is a 75 y o  male  Chief Complaint   Patient presents with    Follow-up     Bakers Cyst       Pt seen at [facility] ED last night for acute right posterior leg pain  W/u and imaging revealed a large baker's cyst which appeared to be ruptured    They recommended ortho eval, surgical consult    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      The following portions of the patient's history were reviewed and updated as appropriate: allergies, current medications, past family history, past medical history, past social history, past surgical history and problem list     Review of Systems   Constitutional: Negative      Musculoskeletal:        Per hpi         Current Outpatient Medications   Medication Sig Dispense Refill    amLODIPine (NORVASC) 10 mg tablet TAKE 1 TABLET BY MOUTH EVERY DAY 90 tablet 1    aspirin 81 MG tablet Take 81 mg by mouth      calcium carbonate (TUMS) 500 mg chewable tablet Chew 2 tablets every 4 (four) hours as needed       Coenzyme Q10 (COQ-10) 100 MG CAPS Take 100 mg by mouth daily       DENTA 5000 PLUS 1 1 % CREA       ezetimibe (ZETIA) 10 mg tablet TAKE 1 TABLET BY MOUTH EVERY DAY 90 tablet 3    furosemide (LASIX) 20 mg tablet TAKE 1 TABLET (20 MG TOTAL) BY MOUTH DAILY  90 tablet 3    Klor-Con M10 10 MEQ tablet TAKE 2 TABLETS BY MOUTH DAILY 180 tablet 3    multivitamin (THERAGRAN) TABS Take 1 tablet by mouth Gummy form      Omega-3 Fatty Acids (FISH OIL PO) Take 1 capsule by mouth daily Gummy form      pantoprazole (PROTONIX) 20 mg tablet TAKE 1 TABLET BY MOUTH ONCE DAILY 90 tablet 1    sotalol (BETAPACE) 80 mg tablet Take 20 mg by mouth daily        valsartan (DIOVAN) 320 MG tablet TAKE 1 TABLET BY MOUTH EVERY DAY 90 tablet 1    XARELTO 20 MG tablet TAKE 1 TABLET BY MOUTH ONCE NDAILY WITH DINNER 30 tablet 0    ofloxacin (OCUFLOX) 0 3 % ophthalmic solution Administer 1 drop into the left eye 4 (four) times a day (Patient not taking: Reported on 9/8/2022) 5 mL 0     No current facility-administered medications for this visit  Objective:    /72   Pulse (!) 53   Ht 5' 5" (1 651 m)   Wt 104 kg (228 lb 3 2 oz)   SpO2 96%   BMI 37 97 kg/m²        Physical Exam  Vitals and nursing note reviewed  Constitutional:       General: He is not in acute distress  Appearance: Normal appearance  Cardiovascular:      Pulses: Normal pulses  Musculoskeletal:      Right lower leg: No edema  Legs:       Comments: Antalgic gait   Neurological:      General: No focal deficit present  Mental Status: He is alert  Sensory: No sensory deficit  Motor: No weakness                  Isaiaha Pill, [address]

## 2022-09-08 NOTE — ED NOTES
Pt states he does not want ace wrap as he has a knee brace at home       Mayito Barajas, RN  09/07/22 1019

## 2022-09-08 NOTE — ED PROVIDER NOTES
History  Chief Complaint   Patient presents with    Leg Pain     Pt reports he is a  and started with calf pain after stepping off the bus  Denies fall, or injury/trauma  HPI  Aimee Martin is a 75 y o  male with PMH significant for HTN, HLD, [employer] on Eliquis coming in today with complaint of leg pain  He reports that this been ongoing since this morning  Describes a sharp pain without any inciting event or injury  He reports he stepped off the bus and noted significant right calf pain  Non radiating  Worse with movement, no alleviating factors  Denies any associated leg swelling  He reports he is compliant with his Xarelto  Denies any redness, fever, chills, nausea, vomiting  Reports pain is moderate  Reports no other complaints at this time       - No language barrier  -PFH/PSH reviewed  - History obtained from patient and chart    Prior to Admission Medications   Prescriptions Last Dose Informant Patient Reported? Taking? CHOLECALCIFEROL PO  Self Yes No   Sig: Take 2,000 Units by mouth Chew form   Coenzyme Q10 (COQ-10) 100 MG CAPS   Yes No   Sig: Take 100 mg by mouth daily    DENTA 5000 PLUS 1 1 % CREA   Yes No   Sig: BRUSH TWICE DAILY   AFTER PM BRUSHING EXPECTORATE BUT DO NOT RINSE   Patient not taking: Reported on 5/24/2022   Klor-Con M10 10 MEQ tablet   No No   Sig: TAKE 2 TABLETS BY MOUTH DAILY   Omega-3 Fatty Acids (FISH OIL PO)   Yes No   Sig: Take 1 capsule by mouth daily Gummy form   XARELTO 20 MG tablet   No No   Sig: TAKE 1 TABLET BY MOUTH ONCE NDAILY WITH DINNER   amLODIPine (NORVASC) 10 mg tablet   No No   Sig: TAKE 1 TABLET BY MOUTH EVERY DAY   aspirin 81 MG tablet   Yes No   Sig: Take 81 mg by mouth    Patient not taking: No sig reported   calcium carbonate (TUMS) 500 mg chewable tablet   Yes No   Sig: Chew 2 tablets every 4 (four) hours as needed    cimetidine (TAGAMET) 200 mg tablet  Self Yes No   Sig: Take 200 mg by mouth daily     Patient not taking: Reported on 2022    ezetimibe (ZETIA) 10 mg tablet   No No   Sig: TAKE 1 TABLET BY MOUTH EVERY DAY   fluticasone (FLONASE) 50 mcg/act nasal spray   Yes No   Si sprays into each nostril daily as needed    Patient not taking: Reported on 2022    furosemide (LASIX) 20 mg tablet   No No   Sig: TAKE 1 TABLET (20 MG TOTAL) BY MOUTH DAILY     lidocaine (LIDODERM) 5 %   Yes No   Sig: Place 1 patch on the skin every 24 hours   methylPREDNISolone 4 MG tablet therapy pack   No No   Sig: Use as directed on package   Patient not taking: Reported on 2022    multivitamin (THERAGRAN) TABS   Yes No   Sig: Take 1 tablet by mouth Gummy form   ofloxacin (OCUFLOX) 0 3 % ophthalmic solution   No No   Sig: Administer 1 drop into the left eye 4 (four) times a day X 5 days   Patient not taking: Reported on 2022    ofloxacin (OCUFLOX) 0 3 % ophthalmic solution   No No   Sig: Administer 1 drop into the left eye 4 (four) times a day   pantoprazole (PROTONIX) 20 mg tablet   No No   Sig: TAKE 1 TABLET BY MOUTH ONCE DAILY   sotalol (BETAPACE) 80 mg tablet   Yes No   Sig: Take 20 mg by mouth daily     valsartan (DIOVAN) 320 MG tablet   No No   Sig: TAKE 1 TABLET BY MOUTH EVERY DAY      Facility-Administered Medications: None       Past Medical History:   Diagnosis Date    Allergic rhinitis due to pollen     last assessed 10/01/15    Aortic aneurysm (HCC)     4 3 cm  2018 last check    Alvares esophagus     CPAP (continuous positive airway pressure) dependence     Diabetes ([address] )     GERD (gastroesophageal reflux disease)     Hyperlipidemia     Hypertension     Irregular heart beat     Mild intermittent asthma without complication     last assessed 10/01/15    Positive PPD     last assesssed 12/21/15    Sleep apnea     Stroke [facility])     unsure if/when happened; no weakness    Syncopal episodes     1 year ago    Vertigo     last assessed 17       Past Surgical History:   Procedure Laterality Date    CARDIAC CATHETERIZATION      COLONOSCOPY N/A 11/14/2018    Procedure: COLONOSCOPY;  Surgeon: Maddison Sabillon MD;  [facility]; Service: Colorectal    EGD AND COLONOSCOPY      ENDOSCOPIC ULTRASOUND (LOWER)  01/23/2020    HERNIA REPAIR      JOINT REPLACEMENT Left     knee    MULTIPLE TOOTH EXTRACTIONS  05/2020    TOTAL KNEE ARTHROPLASTY  08/29/2016    Dr Darshan Bob 07/28/16    UPPER GASTROINTESTINAL ENDOSCOPY  10/21/2019    URETHRA SURGERY      polyps removed       Family History   Problem Relation Age of Onset    Heart disease Mother     Hypertension Mother     Coronary artery disease Father     Hypertension Father     Heart disease Father         cardiac disorder    Stomach cancer Maternal Grandmother     Thyroid disease Neg Hx      I have reviewed and agree with the history as documented  E-Cigarette/Vaping    E-Cigarette Use Never User      E-Cigarette/Vaping Substances    Nicotine No     THC No     CBD No      Social History     Tobacco Use    Smoking status: Never Smoker    Smokeless tobacco: Never Used   Vaping Use    Vaping Use: Never used   Substance Use Topics    Alcohol use: Never    Drug use: No        Review of Systems   Constitutional: Negative for chills and fever  HENT: Negative for ear pain and sore throat  Eyes: Negative for pain and visual disturbance  Respiratory: Negative for cough and shortness of breath  Cardiovascular: Negative for chest pain and palpitations  Gastrointestinal: Negative for abdominal pain and vomiting  Genitourinary: Negative for dysuria and hematuria  Musculoskeletal: Negative for back pain  R calf pain   Skin: Negative for color change and rash  Neurological: Negative for syncope  All other systems reviewed and are negative        Physical Exam  ED Triage Vitals [09/07/22 2216]   Temperature Pulse Respirations Blood Pressure SpO2   98 7 °F (37 1 °C) 66 18 148/76 98 %      Temp Source Heart Rate Source Patient Position - Orthostatic VS BP Location FiO2 (%)   Oral Monitor Sitting Left arm --      Pain Score       --             Orthostatic Vital Signs  Vitals:    09/07/22 2216   BP: 148/76   Pulse: 66   Patient Position - Orthostatic VS: Sitting       Physical Exam  Vitals and nursing note reviewed  Constitutional:       Appearance: He is well-developed  HENT:      Head: Normocephalic and atraumatic  Eyes:      Conjunctiva/sclera: Conjunctivae normal    Cardiovascular:      Rate and Rhythm: Normal rate and regular rhythm  Heart sounds: No murmur heard  Pulmonary:      Effort: Pulmonary effort is normal  No respiratory distress  Breath sounds: Normal breath sounds  Abdominal:      Palpations: Abdomen is soft  Tenderness: There is no abdominal tenderness  Musculoskeletal:         General: Tenderness present  Normal range of motion  Cervical back: Neck supple  Comments: Right calf tenderness, negative Lundberg test, negative Homans sign, no evidence of swelling, ecchymosis, crepitus, erythema or lesion, range of motion intact   Skin:     General: Skin is warm and dry  Neurological:      Mental Status: He is alert  ED Medications  Medications   ibuprofen (MOTRIN) tablet 600 mg (600 mg Oral Not Given 9/7/22 2342)       Diagnostic Studies  Results Reviewed     None                 No orders to display         Procedures  Procedures      ED Course               Identification of Seniors at [address] [place] [address] Most Recent Value   (ISAR) Identification of Seniors at Risk    Before the illness or injury that brought you to the Emergency, did you need someone to help you on a regular basis? 0 Filed at: 09/07/2022 2218   In the last 24 hours, have you needed more help than usual? 0 Filed at: 09/07/2022 2218   Have you been hospitalized for one or more nights during the past 6 months?  1 Filed at: 09/07/2022 2218   In general, do you see well? 0 Filed at: 09/07/2022 2218   In general, do you have serious problems with your memory? 0 Filed at: 09/07/2022 2218   Do you take more than three different medications every day? 1 Filed at: 09/07/2022 2218   ISAR Score 2 Filed at: 09/07/2022 2218                    SBIRT 20yo+    Flowsheet Row Most Recent Value   SBIRT (25 yo +)    In order to provide better care to our patients, we are screening all of our patients for alcohol and drug use  Would it be okay to ask you these screening questions? No Filed at: 09/07/2022 2223                [facility]  Number of Diagnoses or Management Options  Baker's cyst  Right leg pain  Diagnosis management comments: Eladio Rader is a 75 y o  who presents with complaints of R leg pain    Vital signs are stable, physical exam shows R calf tenderness, no overt etiology on exam    Bedside US remarkable for large hypoechoic cyst with no flow enhancement, clinically consistent with Baker's Cyst    Dx: Baker's Cyst    Plan: NSAIDs, Ace wrap, ortho follow up         Amount and/or Complexity of Data Reviewed  Clinical lab tests: ordered and reviewed    Risk of Complications, Morbidity, and/or Mortality  Presenting problems: moderate  Diagnostic procedures: low  Management options: low    Patient Progress  Patient progress: stable  I discussed the above care and treatment plan with the pt and answered all of their relevant questions  Reviewed test results and imaging findings, as well as pertinent details of the treatment plan as described above  He expressed understanding, was agreeable to the plan of care, and had no further questions or concerns  Portions of the record may have been created with voice recognition software  Occasional wrong word or "sound a like" substitutions may have occurred due to the inherent limitations of voice recognition software    Read the chart carefully and recognize, using context, where substitutions have occurred      Disposition  Final diagnoses:   Right leg pain   Baker's cyst     Time reflects when diagnosis was documented in both MDM as applicable and the Disposition within this note     Time User Action Codes Description Comment    9/7/2022 11:26 PM Emry Galvan Add [Q26 509] Right leg pain     9/7/2022 11:26 PM Emry Galvan Add [M71 20] Baker's cyst       ED Disposition     ED Disposition   Discharge    Condition   Stable    Date/Time   Wed Sep 7, 2022 11:26 PM    Swati Blunt 30 discharge to home/self care  Follow-up Information     Follow up With Specialties Details Why Contact Info Additional [address] [facility] Internal Medicine Call   [facility]        [facility] Orthopedic Surgery Call   [address] [phone]  [address] [facility] [address], [address]  [facility]  Use Entrance A           Patient's Medications   Discharge Prescriptions    No medications on file         PDMP Review     None           ED Provider  Attending physically available and evaluated [facility]  I managed the patient along with the ED Attending      Electronically Signed by         Triston Tamez MD  09/08/22 9900

## 2022-10-11 PROBLEM — H10.32 ACUTE CONJUNCTIVITIS OF LEFT EYE: Status: RESOLVED | Noted: 2022-03-01 | Resolved: 2022-10-11

## 2022-10-17 ENCOUNTER — TELEPHONE (OUTPATIENT)
Dept: INTERNAL MEDICINE CLINIC | Facility: CLINIC | Age: 67
End: 2022-10-17

## 2022-10-17 ENCOUNTER — OFFICE VISIT (OUTPATIENT)
Dept: INTERNAL MEDICINE CLINIC | Facility: CLINIC | Age: 67
End: 2022-10-17
Payer: COMMERCIAL

## 2022-10-17 VITALS
BODY MASS INDEX: 39.25 KG/M2 | DIASTOLIC BLOOD PRESSURE: 68 MMHG | HEIGHT: 65 IN | WEIGHT: 235.6 LBS | OXYGEN SATURATION: 96 % | SYSTOLIC BLOOD PRESSURE: 122 MMHG | HEART RATE: 53 BPM

## 2022-10-17 DIAGNOSIS — M67.431 GANGLION CYST OF WRIST, RIGHT: Primary | ICD-10-CM

## 2022-10-17 PROCEDURE — 99213 OFFICE O/P EST LOW 20 MIN: CPT | Performed by: NURSE PRACTITIONER

## 2022-10-17 NOTE — TELEPHONE ENCOUNTER
Patient is requesting we update his vaccines, he reports he had his 4th covid booster at [id number]  He also reports he received both his shingles at this location  I attempted to call and request this information but the pharmacist asked that we fax a request form   Do we have forms for this type of request?    On patient covid card it read:   Yvette Estevez / [id number] / Milan Holt

## 2022-10-17 NOTE — PROGRESS NOTES
Assessment/Plan:    1  Ganglion cyst of wrist, right  Comments:  ulnar aspect    The case discussed with patient using patient centered shared decision making  The patient was counseled regarding instructions for management,-- risk factor reductions,-- prognosis,-- impressions,-- risks and benefits of treatment options,-- importance of compliance with treatment  I have reviewed the instructions with the patient, answering all questions to his satisfaction  Discussed treatment options with patient->drainage, excision, or conserve measures  Pt reassured  Should spontaneously resolve  Pt will call if not better in 2-3 weeks  Can drain in office or refer to Hand surgeon        There are no Patient Instructions on file for this visit  Return in about 2 weeks (around 10/31/2022)  Subjective:      Patient ID: Virginia Runner is a 75 y o  male  Chief Complaint   Patient presents with   • Joint Swelling     right       Pt comes for same day visit  Discovered new lump right wrist  Painless  Works as     Compression sleeve aleviated swelling when using      The following portions of the patient's history were reviewed and updated as appropriate: allergies, current medications, past family history, past medical history, past social history, past surgical history and problem list     Review of Systems   Constitutional: Negative for fever  Musculoskeletal:        Per hpi   Skin: Negative for wound           Current Outpatient Medications   Medication Sig Dispense Refill   • amLODIPine (NORVASC) 10 mg tablet TAKE 1 TABLET BY MOUTH EVERY DAY 90 tablet 1   • aspirin 81 MG tablet Take 81 mg by mouth     • calcium carbonate (TUMS) 500 mg chewable tablet Chew 2 tablets every 4 (four) hours as needed      • Coenzyme Q10 (COQ-10) 100 MG CAPS Take 100 mg by mouth daily      • ezetimibe (ZETIA) 10 mg tablet TAKE 1 TABLET BY MOUTH EVERY DAY 90 tablet 3   • furosemide (LASIX) 20 mg tablet TAKE 1 TABLET (20 MG TOTAL) BY MOUTH DAILY  90 tablet 3   • Klor-Con M10 10 MEQ tablet TAKE 2 TABLETS BY MOUTH DAILY 180 tablet 3   • multivitamin (THERAGRAN) TABS Take 1 tablet by mouth Gummy form     • Omega-3 Fatty Acids (FISH OIL PO) Take 1 capsule by mouth daily Gummy form     • pantoprazole (PROTONIX) 20 mg tablet TAKE 1 TABLET BY MOUTH ONCE DAILY 90 tablet 1   • sotalol (BETAPACE) 80 mg tablet Take 80 mg by mouth 2 (two) times a day Take half tablet 2 times a day     • valsartan (DIOVAN) 320 MG tablet TAKE 1 TABLET BY MOUTH EVERY DAY 90 tablet 1   • XARELTO 20 MG tablet TAKE 1 TABLET BY MOUTH ONCE NDAILY WITH DINNER 30 tablet 0   • DENTA 5000 PLUS 1 1 % CREA  (Patient not taking: Reported on 10/17/2022)     • ofloxacin (OCUFLOX) 0 3 % ophthalmic solution Administer 1 drop into the left eye 4 (four) times a day (Patient not taking: Reported on 10/17/2022) 5 mL 0     No current facility-administered medications for this visit  Objective:    /68   Pulse (!) 53   Ht 5' 5" (1 651 m)   Wt 107 kg (235 lb 9 6 oz)   SpO2 96%   BMI 39 21 kg/m²        Physical Exam  Vitals and nursing note reviewed  Constitutional:       General: He is not in acute distress  Appearance: Normal appearance  He is not ill-appearing  Musculoskeletal:        Arms:    Neurological:      Mental Status: He is alert                  Elio Dent

## 2022-10-19 NOTE — TELEPHONE ENCOUNTER
Spoke to [facility] pharmacy  The patient had the 4th Covid on 5/14/22 Nikko Gordon  The pharmacist also indicated that the patient only received 1 Shingles vaccine on 8/4/22  Spoke to patient and advised  I told him that he should look for any documentation indicating that he received BOTH Shingrix vaccines and discuss with pharmacy so that we can update the chart with the second date

## 2022-10-31 ENCOUNTER — TELEPHONE (OUTPATIENT)
Dept: OTHER | Facility: OTHER | Age: 67
End: 2022-10-31

## 2022-10-31 ENCOUNTER — TELEMEDICINE (OUTPATIENT)
Dept: INTERNAL MEDICINE CLINIC | Facility: CLINIC | Age: 67
End: 2022-10-31

## 2022-10-31 DIAGNOSIS — J01.10 ACUTE NON-RECURRENT FRONTAL SINUSITIS: Primary | ICD-10-CM

## 2022-10-31 DIAGNOSIS — U07.1 COVID-19: ICD-10-CM

## 2022-10-31 LAB
SARS-COV-2 AG UPPER RESP QL IA: POSITIVE
VALID CONTROL: ABNORMAL

## 2022-10-31 RX ORDER — FLUTICASONE PROPIONATE 50 MCG
2 SPRAY, SUSPENSION (ML) NASAL DAILY
Qty: 1 G | Refills: 0 | Status: SHIPPED | OUTPATIENT
Start: 2022-10-31

## 2022-10-31 RX ORDER — ALBUTEROL SULFATE 90 UG/1
3 AEROSOL, METERED RESPIRATORY (INHALATION) ONCE AS NEEDED
Status: CANCELLED | OUTPATIENT
Start: 2022-11-03

## 2022-10-31 RX ORDER — AMOXICILLIN 500 MG/1
500 CAPSULE ORAL EVERY 8 HOURS SCHEDULED
Qty: 30 CAPSULE | Refills: 0 | Status: SHIPPED | OUTPATIENT
Start: 2022-10-31 | End: 2022-11-10

## 2022-10-31 RX ORDER — BEBTELOVIMAB 87.5 MG/ML
175 INJECTION, SOLUTION INTRAVENOUS ONCE
Status: CANCELLED | OUTPATIENT
Start: 2022-11-03

## 2022-10-31 RX ORDER — ACETAMINOPHEN 325 MG/1
650 TABLET ORAL ONCE AS NEEDED
Status: CANCELLED | OUTPATIENT
Start: 2022-11-03

## 2022-10-31 RX ORDER — SODIUM CHLORIDE 9 MG/ML
20 INJECTION, SOLUTION INTRAVENOUS ONCE
Status: CANCELLED | OUTPATIENT
Start: 2022-11-03

## 2022-10-31 RX ORDER — ONDANSETRON 2 MG/ML
4 INJECTION INTRAMUSCULAR; INTRAVENOUS ONCE AS NEEDED
Status: CANCELLED | OUTPATIENT
Start: 2022-11-03

## 2022-10-31 NOTE — TELEPHONE ENCOUNTER
Patient had a virtual appointment with Dr Marnie Leigh this morning and was prescribed antibiotics and a nasal spray  He has now gotten a positive Covid test and would like to know if those medications need to be changed to something else

## 2022-10-31 NOTE — PROGRESS NOTES
Virtual Regular Visit    Verification of patient location:    Patient is located in the following state in which I hold an active license {[facility] virtual patient location:[id number]}      Assessment/Plan:    Problem List Items Addressed This Visit    None     Visit Diagnoses     Acute non-recurrent frontal sinusitis    -  Primary    Relevant Medications    amoxicillin (AMOXIL) 500 mg capsule    fluticasone (FLONASE) 50 mcg/act nasal spray    Other Relevant Orders    COVID Only- Office Collect               Reason for visit is   Chief Complaint   Patient presents with   • COVID-19   • Virtual Regular Visit        Encounter provider Kyung Dhaliwal DO    Provider located at [address] [address] [phone]      Recent Visits  No visits were found meeting these conditions  Showing recent visits within past 7 days and meeting all other requirements  Today's Visits  Date Type Provider Dept   10/31/22 Telemedicine [address] today's visits and meeting all other requirements  Future Appointments  No visits were found meeting these conditions  Showing future appointments within next 150 days and meeting all other requirements       The patient was identified by name and date of birth  Leana Trujillo was informed that this is a telemedicine visit and that the visit is being conducted through {[facility]}  {Telemedicine confidentiality :21646} {Telemedicine participants:96453}  He acknowledged consent and understanding of privacy and security of the video platform  The patient has agreed to participate and understands they can discontinue the visit at any time  Patient is aware this is a billable service  Subjective  Leana Trujillo is a 75 y o  male ***         HPI   Frontal and maxillary  Past Medical History:   Diagnosis Date   • Allergic rhinitis due to pollen     last assessed 10/01/15   • Aortic aneurysm (HCC)     4 3 cm  6/2018 last check   • Alvares esophagus    • CPAP (continuous positive airway pressure) dependence    • Diabetes ([address] )    • GERD (gastroesophageal reflux disease)    • Hyperlipidemia    • Hypertension    • Irregular heart beat    • Mild intermittent asthma without complication     last assessed 10/01/15   • Positive PPD     last assesssed 12/21/15   • Sleep apnea    • Stroke [facility])     unsure if/when happened; no weakness   • Syncopal episodes     1 year ago   • Vertigo     last assessed 04/24/17       Past Surgical History:   Procedure Laterality Date   • CARDIAC CATHETERIZATION     • COLONOSCOPY N/A 11/14/2018    Procedure: COLONOSCOPY;  Surgeon: Josie Mckeon MD;  [facility]; Service: Colorectal   • EGD AND COLONOSCOPY     • ENDOSCOPIC ULTRASOUND (LOWER)  01/23/2020   • HERNIA REPAIR     • JOINT REPLACEMENT Left     knee   • MULTIPLE TOOTH EXTRACTIONS  05/2020   • TOTAL KNEE ARTHROPLASTY  08/29/2016    Dr Kit Mcnally 07/28/16   • UPPER GASTROINTESTINAL ENDOSCOPY  10/21/2019   • URETHRA SURGERY      polyps removed       Current Outpatient Medications   Medication Sig Dispense Refill   • amLODIPine (NORVASC) 10 mg tablet TAKE 1 TABLET BY MOUTH EVERY DAY 90 tablet 1   • amoxicillin (AMOXIL) 500 mg capsule Take 1 capsule (500 mg total) by mouth every 8 (eight) hours for 10 days 30 capsule 0   • aspirin 81 MG tablet Take 81 mg by mouth     • calcium carbonate (TUMS) 500 mg chewable tablet Chew 2 tablets every 4 (four) hours as needed      • Coenzyme Q10 (COQ-10) 100 MG CAPS Take 100 mg by mouth daily      • ezetimibe (ZETIA) 10 mg tablet TAKE 1 TABLET BY MOUTH EVERY DAY 90 tablet 3   • fluticasone (FLONASE) 50 mcg/act nasal spray 2 sprays into each nostril daily 1 g 0   • furosemide (LASIX) 20 mg tablet TAKE 1 TABLET (20 MG TOTAL) BY MOUTH DAILY   90 tablet 3   • Klor-Con M10 10 MEQ tablet TAKE 2 TABLETS BY MOUTH DAILY 180 tablet 3   • multivitamin (THERAGRAN) TABS Take 1 tablet by mouth Gummy form     • Omega-3 Fatty Acids (FISH OIL PO) Take 1 capsule by mouth daily Gummy form     • pantoprazole (PROTONIX) 20 mg tablet TAKE 1 TABLET BY MOUTH ONCE DAILY 90 tablet 1   • sotalol (BETAPACE) 80 mg tablet Take 80 mg by mouth 2 (two) times a day Take half tablet 2 times a day     • valsartan (DIOVAN) 320 MG tablet TAKE 1 TABLET BY MOUTH EVERY DAY 90 tablet 1   • XARELTO 20 MG tablet TAKE 1 TABLET BY MOUTH ONCE NDAILY WITH DINNER 30 tablet 0   • DENTA 5000 PLUS 1 1 % CREA  (Patient not taking: No sig reported)     • ofloxacin (OCUFLOX) 0 3 % ophthalmic solution Administer 1 drop into the left eye 4 (four) times a day (Patient not taking: No sig reported) 5 mL 0     No current facility-administered medications for this visit  Allergies   Allergen Reactions   • Iodine - Food Allergy Shortness Of Breath     Other reaction(s): Respiratory Distress  Other reaction(s): Respiratory Distress   • Omnipaque [Iohexol] Anaphylaxis   • Erythromycin Hives     Other reaction(s): Other (See Comments)  Pleurisy   Pleurisy   Other reaction(s): Other (See Comments)  Pleurisy    • Iodixanol    • Lisinopril Cough   • Statins Myalgia       Review of Systems    Video Exam    There were no vitals filed for this visit      Physical Exam     {Time Spent:12464}

## 2022-11-01 ENCOUNTER — TELEPHONE (OUTPATIENT)
Dept: INTERNAL MEDICINE CLINIC | Facility: CLINIC | Age: 67
End: 2022-11-01

## 2022-11-01 NOTE — TELEPHONE ENCOUNTER
----- Message from Jony Ann sent at 11/1/2022  7:38 AM EDT -----    ----- Message -----  From: Princess Lamb DO  Sent: 10/31/2022   8:53 PM EDT  To: [facility]    Please schedule the patient for monoclonal antibodies tomorrow at the [facility] orders are in the chart thank you

## 2022-11-01 NOTE — ASSESSMENT & PLAN NOTE
Patient did come to the office for a rapid COVID-19 PCR which did come back positive today I did discussed with the patient in detail the treatment options in because of the patient's drug to drug interactions with the paxlovid we have decided together to have him start monoclonal antibody he does have several risk factors/high risk ; orders for monoclonal antibodies have been ordered staff will set up appointment for infusion at the [facility]  Plenty of fluids, res if any shortness of breath or high persistent temperature go immediately to the ER did advise the patient to quarantine x5 days and if better on day 5    And fever free he may leave the home wear a mask at all times RTO after the infusion call if any problems

## 2022-11-01 NOTE — PROGRESS NOTES
COVID-19 Outpatient Progress Note    Assessment/Plan:    Problem List Items Addressed This Visit        Respiratory    Acute non-recurrent frontal sinusitis - Primary     Rx for amoxicillin 500 mg t i d  times 10 days, Flonase 2 sprays each nostril once day times 10 days         Relevant Medications    amoxicillin (AMOXIL) 500 mg capsule    fluticasone (FLONASE) 50 mcg/act nasal spray    Other Relevant Orders    POCT Rapid Covid Ag (Completed)       Other    COVID-19     Patient did come to the office for a rapid COVID-19 PCR which did come back positive today I did discussed with the patient in detail the treatment options in because of the patient's drug to drug interactions with the paxlovid we have decided together to have him start monoclonal antibody he does have several risk factors/high risk ; orders for monoclonal antibodies have been ordered staff will set up appointment [place] the [facility] [place]  Plenty of fluids, res if any shortness of breath or high persistent temperature go immediately to the ER did advise the patient to quarantine x5 days and if better on day 5  And fever free he may leave the home wear a mask at all times RTO after the infusion call if any problems              Disposition:     Recommended patient to come to the office to test for COVID-19  Patient meets criteria for Bebtelovimab infusion  They were counseled in regards to risks, benefits, and side effects of this infusion  Arvid Díaz is an investigational medicine used to treat mild-to-moderate symptoms of COVID-19 in adults and children (15years of age and older weighing at least 80 pounds (40 kg)) with positive results of direct SARS-CoV-2 viral testing, and who are at high risk of progression to severe COVID-19, including hospitalization or death, and for whom other COVID-19 treatment options approved or authorized by FDA are not available or clinically appropriate   Arvid Díaz is investigational because it is still being studied  There is limited information about the safety and effectiveness of using bebtelovimab to treat people with mild-to-moderate COVID-19  The FDA has authorized the emergency use of bebtelovimab for the treatment of COVID-19 under an Emergency Use Authorization (EUA)  Cee Paramjit is not authorized for use in people who:  - are likely to be infected with a SARS-CoV-2 variant that is not able to be treated by bebtelovimab based on the circulating variants in your area (ask your health care provider about FDA and CDC’s latest information on circulating variants by geographic area), or  - are hospitalized due to COVID-19, or  - require oxygen therapy and/or respiratory support due to COVID-19, or  - require an increase in baseline oxygen flow rate and/or respiratory support due to [address] and are on chronic oxygen therapy and/or respiratory support due to underlying nonCOVID-19 related comorbidity  How will I receive Bebtelovimab? Cee Paramjit will be given as an injection through a vein (intravenously or IV) over at least 30 seconds  You will be observed by your healthcare provider for at least 1 hour after you receive bebtelovimab  Possible side effects of Bebtelovimab: Allergic reactions can happen during and after infusion with bebtelovimab  Possible reactions include: fever, chills, nausea, headache, shortness of breath, low or high blood pressure, rapid or slow heart rate, chest discomfort or pain, weakness, confusion, feeling tired, wheezing, swelling of your lips, face, or throat, rash including hives, itching, muscle aches, dizziness, and sweating  These reactions may be severe or life threatening  Worsening symptoms after treatment: You may experience new or worsening symptoms after infusion, including fever, difficulty breathing, rapid or slow heart rate, tiredness, weakness or confusion   If these occur, contact your healthcare provider or seek immediate medical attention as some of these events have required hospitalization  It is unknown if these events are related to treatment or are due to the progression of COVID19  The side effects of getting any medicine by vein may include brief pain, bleeding, bruising of the skin, soreness, swelling, and possible infection at the infusion site  These are not all the possible side effects of bebtelovimab  Not a lot of people have been given bebtelovimab  Serious and unexpected side effects may happen  Bebtelovimab are still being studied so it is possible that all of the risks are not known at this time  It is possible that bebtelovimab could interfere with your body's own ability to fight off a future infection of SARS-CoV-2  Similarly, bebtelovimab may reduce your body’s immune response to a vaccine for SARS-CoV-2  Specific studies have not been conducted to address these possible risks  Talk to your healthcare provider if you have any questions  Emergency Use Authorization:    The [facility] FDA has made bebtelovimab available under an emergency access mechanism called an EUA  The EUA is supported by a [place] of Health and Human Service (HHS) declaration that circumstances exist to justify the emergency use of drugs and biological products during the COVID-19 pandemic  Bebtelovimab have not undergone the same type of review as an FDA-approved or cleared product  The FDA may issue an EUA when certain criteria are met, which includes that there are no adequate, approved, and available alternatives  In addition, the FDA decision is based on the totality of scientific evidence available showing that it is reasonable to believe that the product meets certain criteria for safety, performance, and labeling and may be effective in treatment of patients during the COVID-19 pandemic   All of these criteria must be met to allow for the product to be used in the treatment of patients during the COVID-19 pandemic  The EUA for bebtelovimab together is in effect for the duration of the COVID-19 declaration justifying emergency use of these products, unless terminated or revoked (after which the product may no longer be used)  What if I am pregnant or breastfeeding? There is no experience treating pregnant women or breastfeeding mothers with bebtelovimab  For a mother and unborn baby, the benefit of receiving bebtelovimab may be greater than the risk from the treatment  If you are pregnant or breastfeeding, discuss your options and specific situation with your healthcare provider  How do I report side effects with Bebtelovimab? Contact your healthcare provider if you have any side effects that bother you or do not go away  Report side effects to [employer] at www mGaadi gov/My Own [employer], or call 6-783-HJF-[phone] or to [address] [place] [address]  as shown below  Email: [email or web address]   Fax number: [phone]   Telephone number: [phone] ([phone])    Full fact sheet document for patients can be found at: [email or web address] AudioBeta/    The patient consents to proceed with bebtelovimab infusion  I have spent 20 minutes directly with the patient  Greater than 50% of this time was spent in counseling/coordination of care regarding: diagnostic results, risks and benefits of treatment options, instructions for management, importance of treatment compliance, risk factor reductions and impressions  Encounter provider: Liza Goodman DO     Provider located at: [id number] [address] [address]     Recent Visits  No visits were found meeting these conditions    Showing recent visits within past 7 days and meeting all other requirements  Today's Visits  Date Type Provider Dept   10/31/22 Telemedicine Liza Goodman DO [address] [facility] today's visits and meeting all other requirements  Future Appointments  No visits were found meeting these conditions  Showing future appointments within next 150 days and meeting all other requirements     This virtual check-in was done via [address] and patient was informed that this is a secure, HIPAA-compliant platform  He agrees to proceed  Patient agrees to participate in a virtual check in via telephone or video visit instead of presenting to the office to address urgent/immediate medical needs  Patient is aware this is a billable service  He acknowledged consent and understanding of privacy and security of the video platform  The patient has agreed to participate and understands they can discontinue the visit at any time  After connecting through [facility], the patient was identified by name and date of birth  Kaylah Yap was informed that this was a telemedicine visit and that the exam was being conducted confidentially over secure lines  My office door was closed  No one else was in the room  Kaylah Yap acknowledged consent and understanding of privacy and security of the telemedicine visit  I informed the patient that I have reviewed his record in Epic and presented the opportunity for him to ask any questions regarding the visit today  The patient agreed to participate  Verification of patient location:  Patient is located in the following state in which I hold an active license: [place]    Subjective:   Kaylah Yap is a 75 y o  male who is concerned about COVID-19  Patient's symptoms include rhinorrhea  Patient denies fever, chills, fatigue, sore throat, cough, shortness of breath and myalgias       - Date of symptom onset: 10/30/2022      COVID-19 vaccination status: Fully vaccinated with booster    Exposure:     Hospitalized recently for fever and/or lower respiratory symptoms?: No      Currently a healthcare worker that is involved in direct patient care?: No      Works in a special setting where the risk of COVID-19 transmission may be high? (this may include long-term care, correctional and group home facilities; homeless shelters; assisted-living facilities and group homes ): No      Resident in a special setting where the risk of COVID-19 transmission may be high? (this may include long-term care, correctional and group home facilities; homeless shelters; assisted-living facilities and group homes ): No      Reports me sinus pain and pressure frontal and maxillary x1 day ? Allergy he is not taking a COVID test    Lab Results   Component Value Date    SARSCOV2 Negative 09/13/2021    SARSCOVAG Positive (A) 10/31/2022       Review of Systems   Constitutional: Negative for chills, fatigue and fever  HENT: Positive for rhinorrhea  Negative for sore throat  Respiratory: Negative for cough and shortness of breath  Musculoskeletal: Negative for myalgias  Current Outpatient Medications on File Prior to Visit   Medication Sig   • amLODIPine (NORVASC) 10 mg tablet TAKE 1 TABLET BY MOUTH EVERY DAY   • aspirin 81 MG tablet Take 81 mg by mouth   • calcium carbonate (TUMS) 500 mg chewable tablet Chew 2 tablets every 4 (four) hours as needed    • Coenzyme Q10 (COQ-10) 100 MG CAPS Take 100 mg by mouth daily    • ezetimibe (ZETIA) 10 mg tablet TAKE 1 TABLET BY MOUTH EVERY DAY   • furosemide (LASIX) 20 mg tablet TAKE 1 TABLET (20 MG TOTAL) BY MOUTH DAILY     • Klor-Con M10 10 MEQ tablet TAKE 2 TABLETS BY MOUTH DAILY   • multivitamin (THERAGRAN) TABS Take 1 tablet by mouth Gummy form   • Omega-3 Fatty Acids (FISH OIL PO) Take 1 capsule by mouth daily Gummy form   • pantoprazole (PROTONIX) 20 mg tablet TAKE 1 TABLET BY MOUTH ONCE DAILY   • sotalol (BETAPACE) 80 mg tablet Take 80 mg by mouth 2 (two) times a day Take half tablet 2 times a day   • valsartan (DIOVAN) 320 MG tablet TAKE 1 TABLET BY MOUTH EVERY DAY   • XARELTO 20 MG tablet TAKE 1 TABLET BY MOUTH ONCE NDAILY WITH DINNER   • DENTA 5000 PLUS 1 1 % CREA  (Patient not taking: No sig reported)   • ofloxacin (OCUFLOX) 0 3 % ophthalmic solution Administer 1 drop into the left eye 4 (four) times a day (Patient not taking: No sig reported)       Objective: There were no vitals taken for this visit       Physical Exam no respiratory distress appears comfortable  Yaneli Sanchez DO

## 2022-11-01 NOTE — ASSESSMENT & PLAN NOTE
Rx for amoxicillin 500 mg t i d  times 10 days, Flonase 2 sprays each nostril once day times 10 days

## 2022-11-03 ENCOUNTER — TELEPHONE (OUTPATIENT)
Dept: INTERNAL MEDICINE CLINIC | Facility: CLINIC | Age: 67
End: 2022-11-03

## 2022-11-03 ENCOUNTER — HOSPITAL ENCOUNTER (OUTPATIENT)
Dept: INFUSION CENTER | Facility: HOSPITAL | Age: 67
Discharge: HOME | End: 2022-11-03

## 2022-11-03 VITALS
RESPIRATION RATE: 16 BRPM | SYSTOLIC BLOOD PRESSURE: 128 MMHG | DIASTOLIC BLOOD PRESSURE: 74 MMHG | TEMPERATURE: 97.9 F | OXYGEN SATURATION: 96 % | HEART RATE: 59 BPM

## 2022-11-03 DIAGNOSIS — U07.1 COVID-19: Primary | ICD-10-CM

## 2022-11-03 RX ORDER — ONDANSETRON 2 MG/ML
4 INJECTION INTRAMUSCULAR; INTRAVENOUS ONCE AS NEEDED
Status: DISCONTINUED | OUTPATIENT
Start: 2022-11-03 | End: 2022-11-06 | Stop reason: HOSPADM

## 2022-11-03 RX ORDER — ACETAMINOPHEN 325 MG/1
650 TABLET ORAL ONCE AS NEEDED
Status: DISCONTINUED | OUTPATIENT
Start: 2022-11-03 | End: 2022-11-06 | Stop reason: HOSPADM

## 2022-11-03 RX ORDER — ALBUTEROL SULFATE 90 UG/1
3 AEROSOL, METERED RESPIRATORY (INHALATION) ONCE AS NEEDED
Status: CANCELLED | OUTPATIENT
Start: 2022-11-03

## 2022-11-03 RX ORDER — BEBTELOVIMAB 87.5 MG/ML
175 INJECTION, SOLUTION INTRAVENOUS ONCE
Status: COMPLETED | OUTPATIENT
Start: 2022-11-03 | End: 2022-11-03

## 2022-11-03 RX ORDER — ACETAMINOPHEN 325 MG/1
650 TABLET ORAL ONCE AS NEEDED
Status: CANCELLED | OUTPATIENT
Start: 2022-11-03

## 2022-11-03 RX ORDER — ONDANSETRON 2 MG/ML
4 INJECTION INTRAMUSCULAR; INTRAVENOUS ONCE AS NEEDED
Status: CANCELLED | OUTPATIENT
Start: 2022-11-03

## 2022-11-03 RX ORDER — SODIUM CHLORIDE 9 MG/ML
20 INJECTION, SOLUTION INTRAVENOUS ONCE
Status: DISCONTINUED | OUTPATIENT
Start: 2022-11-03 | End: 2022-11-06 | Stop reason: HOSPADM

## 2022-11-03 RX ORDER — SODIUM CHLORIDE 9 MG/ML
20 INJECTION, SOLUTION INTRAVENOUS ONCE
Status: CANCELLED | OUTPATIENT
Start: 2022-11-03

## 2022-11-03 RX ORDER — ALBUTEROL SULFATE 90 UG/1
3 AEROSOL, METERED RESPIRATORY (INHALATION) ONCE AS NEEDED
Status: DISCONTINUED | OUTPATIENT
Start: 2022-11-03 | End: 2022-11-06 | Stop reason: HOSPADM

## 2022-11-03 RX ORDER — BEBTELOVIMAB 87.5 MG/ML
175 INJECTION, SOLUTION INTRAVENOUS ONCE
Status: CANCELLED | OUTPATIENT
Start: 2022-11-03

## 2022-11-03 RX ADMIN — BEBTELOVIMAB 175 MG: 87.5 INJECTION, SOLUTION INTRAVENOUS at 08:21

## 2022-11-03 NOTE — NURSING NOTE
Pt  ambulatory to treatment area with RN for bebletolimab infusion  EUA reviewed and copy given to patient

## 2022-11-03 NOTE — TELEPHONE ENCOUNTER
Patient had the COVID infusion this morning  Patient was informed that he should have a virtual follow up with in 24 hours after  Please advise where patient can be scheduled

## 2022-11-03 NOTE — NURSING NOTE
Vss   Copy of medication information given to patient  Aware they should have a follow up with fmd within 24 hours of infusion    Pt left treatment area by ambulatory with RN

## 2022-11-04 ENCOUNTER — TELEMEDICINE (OUTPATIENT)
Dept: INTERNAL MEDICINE CLINIC | Facility: CLINIC | Age: 67
End: 2022-11-04

## 2022-11-04 DIAGNOSIS — U07.1 COVID-19: Primary | ICD-10-CM

## 2022-11-04 NOTE — LETTER
November 4, 2022     Patient: Julio Kenny  YOB: 1955  Date of Visit: 11/4/2022      To Whom it May Concern:    Julio Kenny is under my professional care  Susan Chew was seen in my office on 11/4/2022  Susan Chew may return to work on 11/7/22  If you have any questions or concerns, please don't hesitate to call           Sincerely,          Nydia Vasquez,         CC: No Recipients

## 2022-11-04 NOTE — PROGRESS NOTES
COVID-19 Outpatient Progress Note    Assessment/Plan:    Problem List Items Addressed This Visit        Other    COVID-19 - Primary     Improving status post monoclonal infusion tolerated very well fluids, rest quarantine for 5 days since symptom onset if symptoms are improved and no temperature may leave the home using a high quality mask  If any recurrent symptoms please notify me immediately RTO as scheduled call if any problems  113/53  sinue wt 228 pulse ox 98   Disposition:     Patient has asymptomatic or mild COVID-19 infection  Based off CDC guidelines, they were recommended to isolate for 5 days  If they are asymptomatic or symptoms are improving with no fevers in the past 24 hours, isolation may be ended followed by 5 days of wearing a mask when around othes to minimize risk of infecting others  If still have a fever or other symptoms have not improved, continue to isolate until they improve  Regardless of when they end isolation, avoid being around people who are more likely to get very sick from COVID-19 until at least day 11  I have spent 15 minutes directly with the patient  Greater than 50% of this time was spent in counseling/coordination of care regarding: diagnostic results, instructions for management, importance of treatment compliance, risk factor reductions and impressions         Encounter provider: Kirstin Flores DO     Provider located at: [id number] [address] [employer]  [address] [place] [id number]     Recent Visits  Date Type Provider Dept   11/04/22 [id number] [facility] DO [place] [address]   11/03/22 Telephone Kirstin Flores, [facility]   11/01/22 Telephone Kirstin Flores [facility]   10/31/22 Telemedicine Kirstin Flores [address] recent visits within past 7 days and meeting all other requirements  Future Appointments  No visits were found meeting these conditions  Showing future appointments within next 150 days and meeting all other requirements     This virtual check-in was done via [address] and patient was informed that this is a secure, HIPAA-compliant platform  He agrees to proceed  Patient agrees to participate in a virtual check in via telephone or video visit instead of presenting to the office to address urgent/immediate medical needs  Patient is aware this is a billable service  He acknowledged consent and understanding of privacy and security of the video platform  The patient has agreed to participate and understands they can discontinue the visit at any time  After connecting through [facility], the patient was identified by name and date of birth  Yael Skaggs was informed that this was a telemedicine visit and that the exam was being conducted confidentially over secure lines  My office door was closed  No one else was in the room  Yael Skaggs acknowledged consent and understanding of privacy and security of the telemedicine visit  I informed the patient that I have reviewed his record in Epic and presented the opportunity for him to ask any questions regarding the visit today  The patient agreed to participate  Verification of patient location:  Patient is located in the following state in which I hold an active license: [place]    Subjective:   Yael Skaggs is a 75 y o  male who has been screened for COVID-19  Symptom change since last report: resolving  Patient denies fever, chills, fatigue, malaise, congestion, rhinorrhea, sore throat, anosmia, loss of taste, cough, shortness of breath, chest tightness, abdominal pain, nausea, vomiting, diarrhea, myalgias and headaches  - Date of symptom onset: 10/30/2022  - Date of positive COVID-19 test: 10/31/2022  Type of test: Home antigen       COVID-19 vaccination status: Fully vaccinated with booster    Lucille Hunter has been staying home and has isolated themselves in his home  He is taking care to not share personal items and is cleaning all surfaces that are touched often, like counters, tabletops, and doorknobs using household cleaning sprays or wipes  He is wearing a mask when he leaves his room  Monoclonal Antibody Follow-up Symptom Questionnaire  I feel overall: similar  My breathing is: similar  My fever is: same  My fatigue is: much better    Lab Results   Component Value Date    SARSCOV2 Negative 09/13/2021    SARSCOVAG Positive (A) 10/31/2022       Review of Systems   Constitutional: Negative for chills, fatigue and fever  HENT: Negative for congestion, rhinorrhea and sore throat  Respiratory: Negative for cough, chest tightness and shortness of breath  Gastrointestinal: Negative for abdominal pain, diarrhea, nausea and vomiting  Musculoskeletal: Negative for myalgias  Neurological: Negative for headaches  Current Outpatient Medications on File Prior to Visit   Medication Sig   • amLODIPine (NORVASC) 10 mg tablet TAKE 1 TABLET BY MOUTH EVERY DAY   • amoxicillin (AMOXIL) 500 mg capsule Take 1 capsule (500 mg total) by mouth every 8 (eight) hours for 10 days   • aspirin 81 MG tablet Take 81 mg by mouth   • calcium carbonate (TUMS) 500 mg chewable tablet Chew 2 tablets every 4 (four) hours as needed    • Coenzyme Q10 (COQ-10) 100 MG CAPS Take 100 mg by mouth daily    • ezetimibe (ZETIA) 10 mg tablet TAKE 1 TABLET BY MOUTH EVERY DAY   • fluticasone (FLONASE) 50 mcg/act nasal spray 2 sprays into each nostril daily   • furosemide (LASIX) 20 mg tablet TAKE 1 TABLET (20 MG TOTAL) BY MOUTH DAILY     • Klor-Con M10 10 MEQ tablet TAKE 2 TABLETS BY MOUTH DAILY   • multivitamin (THERAGRAN) TABS Take 1 tablet by mouth Gummy form   • Omega-3 Fatty Acids (FISH OIL PO) Take 1 capsule by mouth daily Gummy form   • pantoprazole (PROTONIX) 20 mg tablet TAKE 1 TABLET BY MOUTH ONCE DAILY   • sotalol (BETAPACE) 80 mg tablet Take 80 mg by mouth 2 (two) times a day Take half tablet 2 times a day   • valsartan (DIOVAN) 320 MG tablet TAKE 1 TABLET BY MOUTH EVERY DAY   • XARELTO 20 MG tablet TAKE 1 TABLET BY MOUTH ONCE NDAILY WITH DINNER   • DENTA 5000 PLUS 1 1 % CREA  (Patient not taking: No sig reported)   • ofloxacin (OCUFLOX) 0 3 % ophthalmic solution Administer 1 drop into the left eye 4 (four) times a day (Patient not taking: No sig reported)       Objective: There were no vitals taken for this visit       Physical Exam  Aliya Cons, DO

## 2022-11-06 NOTE — ASSESSMENT & PLAN NOTE
Improving status post monoclonal infusion tolerated very well fluids, rest quarantine for 5 days since symptom onset if symptoms are improved and no temperature may leave the home using a high quality mask  If any recurrent symptoms please notify me immediately RTO as scheduled call if any problems

## 2022-11-12 DIAGNOSIS — K22.710 BARRETT'S ESOPHAGUS WITH LOW GRADE DYSPLASIA: ICD-10-CM

## 2022-11-12 RX ORDER — PANTOPRAZOLE SODIUM 20 MG/1
TABLET, DELAYED RELEASE ORAL
Qty: 90 TABLET | Refills: 1 | Status: SHIPPED | OUTPATIENT
Start: 2022-11-12

## 2022-11-22 DIAGNOSIS — J01.10 ACUTE NON-RECURRENT FRONTAL SINUSITIS: ICD-10-CM

## 2022-11-22 RX ORDER — FLUTICASONE PROPIONATE 50 MCG
SPRAY, SUSPENSION (ML) NASAL
Qty: 48 ML | Refills: 1 | Status: SHIPPED | OUTPATIENT
Start: 2022-11-22

## 2022-12-30 PROBLEM — J01.10 ACUTE NON-RECURRENT FRONTAL SINUSITIS: Status: RESOLVED | Noted: 2022-10-31 | Resolved: 2022-12-30

## 2023-02-08 DIAGNOSIS — I10 ESSENTIAL HYPERTENSION: ICD-10-CM

## 2023-02-08 RX ORDER — VALSARTAN 320 MG/1
TABLET ORAL
Qty: 90 TABLET | Refills: 1 | Status: SHIPPED | OUTPATIENT
Start: 2023-02-08

## 2023-02-15 DIAGNOSIS — I10 PRIMARY HYPERTENSION: ICD-10-CM

## 2023-02-15 RX ORDER — AMLODIPINE BESYLATE 10 MG/1
TABLET ORAL
Qty: 90 TABLET | Refills: 1 | Status: SHIPPED | OUTPATIENT
Start: 2023-02-15

## 2023-02-27 ENCOUNTER — APPOINTMENT (OUTPATIENT)
Dept: LAB | Age: 68
End: 2023-02-27

## 2023-02-27 DIAGNOSIS — E78.5 HYPERLIPIDEMIA, UNSPECIFIED HYPERLIPIDEMIA TYPE: ICD-10-CM

## 2023-02-27 DIAGNOSIS — I25.10 ATHEROSCLEROSIS OF NATIVE CORONARY ARTERY WITHOUT ANGINA PECTORIS, UNSPECIFIED WHETHER NATIVE OR TRANSPLANTED HEART: ICD-10-CM

## 2023-02-27 LAB
ALBUMIN SERPL BCP-MCNC: 3.6 G/DL (ref 3.5–5)
ALP SERPL-CCNC: 92 U/L (ref 46–116)
ALT SERPL W P-5'-P-CCNC: 26 U/L (ref 12–78)
ANION GAP SERPL CALCULATED.3IONS-SCNC: 1 MMOL/L (ref 4–13)
AST SERPL W P-5'-P-CCNC: 17 U/L (ref 5–45)
BASOPHILS # BLD AUTO: 0.05 THOUSANDS/ÂΜL (ref 0–0.1)
BASOPHILS NFR BLD AUTO: 1 % (ref 0–1)
BILIRUB SERPL-MCNC: 0.62 MG/DL (ref 0.2–1)
BUN SERPL-MCNC: 13 MG/DL (ref 5–25)
CALCIUM SERPL-MCNC: 9.8 MG/DL (ref 8.3–10.1)
CHLORIDE SERPL-SCNC: 108 MMOL/L (ref 96–108)
CO2 SERPL-SCNC: 31 MMOL/L (ref 21–32)
CREAT SERPL-MCNC: 0.72 MG/DL (ref 0.6–1.3)
EOSINOPHIL # BLD AUTO: 0.29 THOUSAND/ÂΜL (ref 0–0.61)
EOSINOPHIL NFR BLD AUTO: 6 % (ref 0–6)
ERYTHROCYTE [DISTWIDTH] IN BLOOD BY AUTOMATED COUNT: 12.8 % (ref 11.6–15.1)
GFR SERPL CREATININE-BSD FRML MDRD: 95 ML/MIN/1.73SQ M
GLUCOSE P FAST SERPL-MCNC: 103 MG/DL (ref 65–99)
HCT VFR BLD AUTO: 43.4 % (ref 36.5–49.3)
HGB BLD-MCNC: 14.5 G/DL (ref 12–17)
IMM GRANULOCYTES # BLD AUTO: 0 THOUSAND/UL (ref 0–0.2)
IMM GRANULOCYTES NFR BLD AUTO: 0 % (ref 0–2)
LYMPHOCYTES # BLD AUTO: 1.86 THOUSANDS/ÂΜL (ref 0.6–4.47)
LYMPHOCYTES NFR BLD AUTO: 36 % (ref 14–44)
MCH RBC QN AUTO: 31 PG (ref 26.8–34.3)
MCHC RBC AUTO-ENTMCNC: 33.4 G/DL (ref 31.4–37.4)
MCV RBC AUTO: 93 FL (ref 82–98)
MONOCYTES # BLD AUTO: 0.47 THOUSAND/ÂΜL (ref 0.17–1.22)
MONOCYTES NFR BLD AUTO: 9 % (ref 4–12)
NEUTROPHILS # BLD AUTO: 2.51 THOUSANDS/ÂΜL (ref 1.85–7.62)
NEUTS SEG NFR BLD AUTO: 48 % (ref 43–75)
NRBC BLD AUTO-RTO: 0 /100 WBCS
PLATELET # BLD AUTO: 208 THOUSANDS/UL (ref 149–390)
PMV BLD AUTO: 10.6 FL (ref 8.9–12.7)
POTASSIUM SERPL-SCNC: 3.8 MMOL/L (ref 3.5–5.3)
PROT SERPL-MCNC: 7.4 G/DL (ref 6.4–8.4)
RBC # BLD AUTO: 4.67 MILLION/UL (ref 3.88–5.62)
SODIUM SERPL-SCNC: 140 MMOL/L (ref 135–147)
WBC # BLD AUTO: 5.18 THOUSAND/UL (ref 4.31–10.16)

## 2023-02-28 LAB — LPA SERPL-SCNC: 43.5 NMOL/L

## 2023-03-01 ENCOUNTER — APPOINTMENT (OUTPATIENT)
Dept: LAB | Age: 68
End: 2023-03-01

## 2023-03-01 DIAGNOSIS — E78.9 LIPID DISORDER: ICD-10-CM

## 2023-03-01 DIAGNOSIS — I25.10 CORONARY ARTERY DISEASE INVOLVING NATIVE CORONARY ARTERY WITHOUT ANGINA PECTORIS, UNSPECIFIED WHETHER NATIVE OR TRANSPLANTED HEART: ICD-10-CM

## 2023-03-01 DIAGNOSIS — I48.0 PAROXYSMAL ATRIAL FIBRILLATION (HCC): ICD-10-CM

## 2023-03-01 DIAGNOSIS — I71.20 THORACIC AORTIC ANEURYSM WITHOUT RUPTURE, UNSPECIFIED PART: ICD-10-CM

## 2023-03-01 DIAGNOSIS — E66.01 MORBID OBESITY (HCC): ICD-10-CM

## 2023-03-02 LAB
CHOLEST SERPL-MCNC: 178 MG/DL
HDLC SERPL-MCNC: 49 MG/DL
LDLC SERPL CALC-MCNC: 108 MG/DL (ref 0–100)
NONHDLC SERPL-MCNC: 129 MG/DL
TRIGL SERPL-MCNC: 103 MG/DL

## 2023-03-22 DIAGNOSIS — E87.6 HYPOKALEMIA: ICD-10-CM

## 2023-03-22 RX ORDER — POTASSIUM CHLORIDE 750 MG/1
TABLET, EXTENDED RELEASE ORAL
Qty: 180 TABLET | Refills: 3 | Status: SHIPPED | OUTPATIENT
Start: 2023-03-22

## 2023-03-29 ENCOUNTER — TELEPHONE (OUTPATIENT)
Dept: NEUROLOGY | Facility: CLINIC | Age: 68
End: 2023-03-29

## 2023-03-29 NOTE — TELEPHONE ENCOUNTER
LMOM informing pt appt slot is currently available for tomorrow 3/30 with Dr Dalton Rios for 11:30 am at [facility] location  If pt calls and slot is still available please schedule pt

## 2023-03-29 NOTE — TELEPHONE ENCOUNTER
Pt called in regarding his [employer] forms  He is unsure if he needs to be seen or if Dr Mikel Deutsch can fill them out  He needs this done before 5/24  Please advise

## 2023-05-01 ENCOUNTER — OFFICE VISIT (OUTPATIENT)
Dept: SLEEP CENTER | Facility: CLINIC | Age: 68
End: 2023-05-01

## 2023-05-01 VITALS
OXYGEN SATURATION: 98 % | HEART RATE: 55 BPM | WEIGHT: 230 LBS | BODY MASS INDEX: 38.32 KG/M2 | DIASTOLIC BLOOD PRESSURE: 82 MMHG | HEIGHT: 65 IN | SYSTOLIC BLOOD PRESSURE: 134 MMHG

## 2023-05-01 DIAGNOSIS — J45.20 MILD INTERMITTENT ASTHMA WITHOUT COMPLICATION: ICD-10-CM

## 2023-05-01 DIAGNOSIS — I63.81 RIGHT-SIDED LACUNAR INFARCTION (HCC): ICD-10-CM

## 2023-05-01 DIAGNOSIS — G47.33 OSA (OBSTRUCTIVE SLEEP APNEA): Primary | ICD-10-CM

## 2023-05-01 DIAGNOSIS — E66.9 OBESITY (BMI 30-39.9): ICD-10-CM

## 2023-05-01 DIAGNOSIS — I10 PRIMARY HYPERTENSION: ICD-10-CM

## 2023-05-01 DIAGNOSIS — I48.0 PAROXYSMAL ATRIAL FIBRILLATION (HCC): ICD-10-CM

## 2023-05-01 DIAGNOSIS — I48.4 ATYPICAL ATRIAL FLUTTER (HCC): ICD-10-CM

## 2023-05-01 NOTE — PROGRESS NOTES
Follow-Up Note - [address]  75 y o  male  WSM:[birth year]  MDP:[id number]  DOS:5/1/2023    CC: I saw this patient for follow-up in clinic today for Sleep disordered breathing, Coexisting Sleep and Medical Problems  He was previously under care of Dr Cristóbal Canada  Patient received ot a refurbished dream Station Version 1 machine from [employer] several months ago  Interval changes: None reported  Results of a split study in 2015: The diagnostic portion demonstrated an AHI of 16/h with minimum oxygen saturation of 90%  During the therapeutic portion of the study, sleep disordered breathing was adequately remediated with CPAP at 10 cm H2O  PFSH, Problem List, Medications & Allergies were reviewed in EMR  He  has a past medical history of Allergic rhinitis due to pollen, Aortic aneurysm ([facility] ), Alvares esophagus, CPAP (continuous positive airway pressure) dependence, Diabetes ([facility] ), GERD (gastroesophageal reflux disease), Hyperlipidemia, Hypertension, Irregular heart beat, Mild intermittent asthma without complication, Positive PPD, Sleep apnea, Stroke ([facility] ), Syncopal episodes, and Vertigo  He has a current medication list which includes the following prescription(s): amlodipine, aspirin, tums, coq-10, ezetimibe, furosemide, klor-con m10, multivitamin, omega-3 fatty acids, pantoprazole, sotalol, valsartan, xarelto, denta 5000 plus, fluticasone, and ofloxacin  PHYSIOLOGICAL DATA REVIEW : In past 90 days, using PAP > 4 hours/night 90%  Estimated TRINITY 0 9/hour with pressure of 10cm H2O; patient has been using non FDA approved devices to sanitize the machine  INTERPRETATION: Compliance is Very good; Pressure setting is:within target range; ;   SUBJECTIVE: With respect to use of PAP, Jatinder Lopes  is experiencing no adverse effects:  He derives benefit from use and is satisfied    Other issues: None reported     Sleep Routine: Jatinder Lopes reports getting 6-8 hrs sleep; he has no difficulty initiating "or maintaining sleep   He arises spontaneously and feels refreshed  Duong Levin denies excessive daytime sleepiness,  He rated [himself] at Total score: 0 /24 on the Dawn Sleepiness Scale  Habits:[ reports that he has never smoked  He has never used smokeless tobacco ], [ reports no history of alcohol use ], [ reports no history of drug use ], Caffeine use:limited; Exercise routine: regular  ROS: Significant for weight fluctuates in the range of a few pounds  He is no longer needing any medication for allergies or asthma  Acid reflux is controlled he is reporting no cardiac symptoms  Hernandez Lutts EXAM: /82 (BP Location: Right arm, Patient Position: Sitting, Cuff Size: Standard)   Pulse 55   Ht 5' 5\" (1 651 m)   Wt 104 kg (230 lb)   SpO2 98%   BMI 38 27 kg/m²     Wt Readings from Last 3 Encounters:   05/01/23 104 kg (230 lb)   04/21/23 104 kg (229 lb)   10/17/22 107 kg (235 lb 9 6 oz)      Patient is well groomed; well appearing  CNS: Alert, orientated, clear & coherent speech  Psych: cooperative and in no distress  Mental state: Appears normal   H&N: EOMI; NC/AT: No facial pressure marks, no rashes  Skin/Extrem: col & hydration normal; no edema  Resp: Respiratory effort is normal  Physical findings otherwise essentially unchanged from previous  IMPRESSION: Problem List Items & Comorbidities Addressed this Visit    1  VIRGEN (obstructive sleep apnea)  PAP DME Resupply/Reorder    Cpap DME      2  Mild intermittent asthma without complication        3  Primary hypertension        4  Paroxysmal atrial fibrillation (HCC)        5  Atypical atrial flutter (HCC)        6  Right-sided lacunar infarction (HCC)        7  Obesity (BMI 30-39  9)            PLAN:  1  I reviewed results of prior studies and physiologic data with the patient  2  I discussed treatment options with risks and benefits  3  Treatment with  PAP is medically necessary and Duong Levin is agreable to continue use     4  Care of equipment, " "methods to improve comfort using PAP and importance of compliance with therapy were discussed  5  Pressure setting: continue 10 cmH2O     6  The patient's current unit has now reached its 5 yr reasonable, useful life and is asking for a prescription to replace  7  Rx provided to replace supplies and Care coordinated with DME provider  8  Discussed strategies for weight reduction  9  Follow-up is advised in 1 year or sooner if needed to monitor progress, compliance and to adjust therapy  Thank you for allowing me to participate in the care of this patient  Sincerely,     Authenticated electronically on 21/81/55   Board Certified Specialist     Portions of the record may have been created with voice recognition software  Occasional wrong word or \"sound a like\" substitutions may have occurred due to the inherent limitations of voice recognition software  There may also be notations and random deletions of words or characters from malfunctioning software  Read the chart carefully and recognize, using context, where substitutions/deletions have occurred      "

## 2023-05-01 NOTE — PATIENT INSTRUCTIONS

## 2023-05-02 ENCOUNTER — TELEPHONE (OUTPATIENT)
Dept: SLEEP CENTER | Facility: CLINIC | Age: 68
End: 2023-05-02

## 2023-05-02 NOTE — TELEPHONE ENCOUNTER
Order for replacement machine and clinicals  sent to [facility]  Patient is eligible after 5/39/2023  He plans to go to the [facility] store  Resupply order sent to [facility] also

## 2023-05-03 LAB

## 2023-05-05 ENCOUNTER — RA CDI HCC (OUTPATIENT)
Dept: OTHER | Facility: HOSPITAL | Age: 68
End: 2023-05-05

## 2023-05-05 NOTE — PROGRESS NOTES
[facility]  coding opportunities       Chart reviewed, no opportunity found:   [address]        Patients Insurance     Medicare Insurance: [employer] Advantage

## 2023-05-07 ENCOUNTER — TELEPHONE (OUTPATIENT)
Dept: SLEEP CENTER | Facility: CLINIC | Age: 68
End: 2023-05-07

## 2023-05-07 NOTE — TELEPHONE ENCOUNTER
Pt  is not eligible for a new machine at this time, he received his last machine 7/8/2020  I have advised the dr , I have voided the order  Pt  may go to the [address] [place] [address] [address] office to obtain his new machine (per the nurses notes in EPIC) I have it documented in his account to advise him of this at that time  No scheduled DME set up so nothing to cancel  Staff may want to cancel a compliance tom

## 2023-05-12 ENCOUNTER — OFFICE VISIT (OUTPATIENT)
Dept: INTERNAL MEDICINE CLINIC | Facility: CLINIC | Age: 68
End: 2023-05-12

## 2023-05-12 VITALS
BODY MASS INDEX: 38.35 KG/M2 | HEART RATE: 68 BPM | HEIGHT: 65 IN | SYSTOLIC BLOOD PRESSURE: 128 MMHG | WEIGHT: 230.2 LBS | DIASTOLIC BLOOD PRESSURE: 82 MMHG | OXYGEN SATURATION: 97 %

## 2023-05-12 DIAGNOSIS — I10 PRIMARY HYPERTENSION: ICD-10-CM

## 2023-05-12 DIAGNOSIS — Z00.00 MEDICARE ANNUAL WELLNESS VISIT, SUBSEQUENT: ICD-10-CM

## 2023-05-12 DIAGNOSIS — E66.01 MORBID OBESITY (HCC): ICD-10-CM

## 2023-05-12 DIAGNOSIS — G47.33 OSA ON CPAP: ICD-10-CM

## 2023-05-12 DIAGNOSIS — R73.03 PREDIABETES: ICD-10-CM

## 2023-05-12 DIAGNOSIS — I48.4 ATYPICAL ATRIAL FLUTTER (HCC): ICD-10-CM

## 2023-05-12 DIAGNOSIS — Z23 ENCOUNTER FOR IMMUNIZATION: Primary | ICD-10-CM

## 2023-05-12 DIAGNOSIS — R89.9 ABNORMAL LABORATORY TEST: ICD-10-CM

## 2023-05-12 DIAGNOSIS — Z99.89 OSA ON CPAP: ICD-10-CM

## 2023-05-12 DIAGNOSIS — Z12.5 SCREENING PSA (PROSTATE SPECIFIC ANTIGEN): ICD-10-CM

## 2023-05-12 NOTE — PROGRESS NOTES
Assessment and Plan:     Problem List Items Addressed This Visit        Respiratory    VIRGEN on CPAP     Clinically stable and doing well with CPAP            Cardiovascular and Mediastinum    HTN (hypertension)     Hypertension - controlled, I have counseled patient following healthy balance diet, I would like the patient reduce sodium, exercise routinely, I would like the patient continued the med current medical regiment and we will continue to monitor  Atypical atrial flutter (HCC)     Clinically stable doing well currently on Betapace, aspirin, Xarelto patient sees cardiology routinely he is currently stable and doing well            Other    Morbid obesity ([facility] )     Obesity -I have counseled patient following healthy and balanced diet, I would like the patient to lose weight, I would like the patient exercise routinely; we will continue monitor the patient's progress  Prediabetes     Reduce carbohydrates/sweets weight loss routine exercise         Screening PSA (prostate specific antigen)    Relevant Orders    PSA, Total Screen    Medicare annual wellness visit, subsequent     Assessment and plan 1  Medicare subsequent annual wellness examination overall the patient is clinically stable and doing well, we encouraged the patient to follow a healthy and balanced diet  We recommend that the patient exercise routinely approximately 30 minutes 5 times per week   We have reviewed the patient's vaccines and have made recommendations for updates if necessary    annual flu shot  We will be ordering screening laboratories which are age appropriate  Return to the office in   6 months   call if any problems  Other Visit Diagnoses     Encounter for immunization    -  Primary    Abnormal laboratory test        Relevant Orders    Protein electrophoresis, serum    Protein electrophoresis, urine      going for eye exam today  Return to office 6  months  call if any problems  BMI Counseling:  Body mass index is 38 31 kg/m²  The BMI is above normal  Nutrition recommendations include decreasing portion sizes and moderation in carbohydrate intake  Exercise recommendations include exercising 3-5 times per week  Rationale for BMI follow-up plan is due to patient being overweight or obese  Depression Screening and Follow-up Plan: Patient was screened for depression during today's encounter  They screened negative with a PHQ-2 score of 0  Preventive health issues were discussed with patient, and age appropriate screening tests were ordered as noted in patient's After Visit Summary  Personalized health advice and appropriate referrals for health education or preventive services given if needed, as noted in patient's After Visit Summary  History of Present Illness:     Patient presents for a Medicare Wellness Visit    HPI 71-year old male coming in for a follow up visit regarding a flutter, VIRGEN, obesity, hypertension and prediabetes; the patient reports me compliant taking medications without untoward side effects the  The patient is here to review his medical condition, update me on the medical condition and the patient reports me no hospitalizations and no ER visits  No injuries no illnesses overall doing fairly well follow a healthy balanced diet exercise is swimming routinely here to review laboratories compliant using his CPAP overall is doing well  He is here to review his laboratories in detail  Patient Care Team:  Mai Michaud DO as PCP - MD Shalini Underwood MD Gerrie Cola, MD Santina Bard, MD (Colon and Rectal Surgery)  Jill Garcia MD as Endoscopist     Review of Systems:     Review of Systems   Constitutional: Negative for activity change, appetite change and unexpected weight change  HENT: Negative for congestion  Eyes: Negative for visual disturbance  Respiratory: Negative for cough and shortness of breath      Cardiovascular: Negative for chest pain    Gastrointestinal: Negative for abdominal pain, diarrhea, nausea and vomiting  Neurological: Negative for dizziness, light-headedness and headaches  Hematological: Negative for adenopathy          Problem List:     Patient Active Problem List   Diagnosis   • HTN (hypertension)   • Morbid obesity ([facility] )   • VIRGEN on CPAP   • Prediabetes   • Right-sided lacunar infarction [facility])   • Abnormal gait   • Acute maxillary sinusitis   • Aortic valve disorder   • Arthralgia of knee   • Carotid bruit   • Atherosclerosis of native coronary artery of native heart without angina pectoris   • Elevated ALT measurement   • Hemorrhoid   • Elevated liver enzymes   • Ambulatory dysfunction   • Horseshoe kidney   • Hyperlipidemia   • Microscopic hematuria   • Mild intermittent asthma without complication   • Status post total left knee replacement   • Thoracic aortic aneurysm (HCC)   • Venous insufficiency of both lower extremities   • History of transient ischemic attack (TIA)   • Rectal bleeding   • Special screening for malignant neoplasms, colon   • Cough   • Wheeze   • Mass in chest   • Screening PSA (prostate specific antigen)   • Change in stool   • Gastroesophageal reflux disease without esophagitis/Alvares's esophagus   • Hypokalemia   • Paroxysmal atrial fibrillation (HCC)   • History of CVA (cerebrovascular accident)   • Asthma   • Atypical atrial flutter (HCC)   • Urine abnormality   • Allergy to iodine   • Anticoagulated   • High risk medication use   • Nonrheumatic aortic valve insufficiency   • Rash   • Morbid obesity with BMI of 45 0-49 9, adult (HCC)   • Bradycardia   • Finger abrasion   • Acute right-sided low back pain without sciatica   • COVID-19   • VIRGEN (obstructive sleep apnea)   • Medicare annual wellness visit, subsequent      Past Medical and Surgical History:     Past Medical History:   Diagnosis Date   • Allergic rhinitis due to pollen     last assessed 10/01/15   • Aortic aneurysm (HCC)     4 3 cm 6/2018 last check   • Alvares esophagus    • CPAP (continuous positive airway pressure) dependence    • Diabetes (HCC)    • GERD (gastroesophageal reflux disease)    • Hyperlipidemia    • Hypertension    • Irregular heart beat    • Mild intermittent asthma without complication     last assessed 10/01/15   • Positive PPD     last assesssed 12/21/15   • Sleep apnea    • Stroke [facility])     unsure if/when happened; no weakness   • Syncopal episodes     1 year ago   • Vertigo     last assessed 04/24/17     Past Surgical History:   Procedure Laterality Date   • CARDIAC CATHETERIZATION     • COLONOSCOPY N/A 11/14/2018    Procedure: COLONOSCOPY;  Surgeon: Duane Roughen, MD;  Location:  GI LAB;   Service: Colorectal   • EGD AND COLONOSCOPY     • ENDOSCOPIC ULTRASOUND (LOWER)  01/23/2020   • HERNIA REPAIR     • JOINT REPLACEMENT Left     knee   • MULTIPLE TOOTH EXTRACTIONS  05/2020   • TOTAL KNEE ARTHROPLASTY  08/29/2016    Dr Sadie Allen 07/28/16   • UPPER GASTROINTESTINAL ENDOSCOPY  10/21/2019   • URETHRA SURGERY      polyps removed      Family History:     Family History   Problem Relation Age of Onset   • Heart disease Mother    • Hypertension Mother    • Coronary artery disease Father    • Hypertension Father    • Heart disease Father         cardiac disorder   • Stomach cancer Maternal Grandmother    • Thyroid disease Neg Hx       Social History:     Social History     Socioeconomic History   • Marital status: /Civil Union     Spouse name: None   • Number of children: 2   • Years of education: None   • Highest education level: None   Occupational History   • None   Tobacco Use   • Smoking status: Never   • Smokeless tobacco: Never   Vaping Use   • Vaping Use: Never used   Substance and Sexual Activity   • Alcohol use: Never   • Drug use: No   • Sexual activity: Yes   Other Topics Concern   • None   Social History Narrative    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[address] Resource Strain: Low Risk    • Difficulty of Paying Living Expenses: Not hard at all   Food Insecurity: Not on file   Transportation Needs: No Transportation Needs   • Lack of Transportation (Medical): No   • Lack of Transportation (Non-Medical): No   Physical Activity: Not on file   Stress: Not on file   Social Connections: Not on file   Intimate Partner Violence: Not on file   Housing Stability: Not on file      Medications and Allergies:     Current Outpatient Medications   Medication Sig Dispense Refill   • amLODIPine (NORVASC) 10 mg tablet TAKE 1 TABLET BY MOUTH EVERY DAY 90 tablet 1   • aspirin 81 MG tablet Take 81 mg by mouth     • calcium carbonate (TUMS) 500 mg chewable tablet Chew 2 tablets every 4 (four) hours as needed      • Coenzyme Q10 (COQ-10) 100 MG CAPS Take 100 mg by mouth daily      • ezetimibe (ZETIA) 10 mg tablet TAKE 1 TABLET BY MOUTH EVERY DAY 90 tablet 3   • furosemide (LASIX) 20 mg tablet TAKE 1 TABLET (20 MG TOTAL) BY MOUTH DAILY   90 tablet 3   • Klor-Con M10 10 MEQ tablet TAKE 2 TABLETS BY MOUTH DAILY 180 tablet 3   • multivitamin (THERAGRAN) TABS Take 1 tablet by mouth Gummy form     • Omega-3 Fatty Acids (FISH OIL PO) Take 1 capsule by mouth daily Gummy form     • pantoprazole (PROTONIX) 20 mg tablet TAKE 1 TABLET BY MOUTH EVERY DAY 90 tablet 1   • sotalol (BETAPACE) 80 mg tablet Take 80 mg by mouth 2 (two) times a day Take half tablet 2 times a day     • valsartan (DIOVAN) 320 MG tablet TAKE 1 TABLET BY MOUTH EVERY DAY 90 tablet 1   • XARELTO 20 MG tablet TAKE 1 TABLET BY MOUTH ONCE NDAILY WITH DINNER 30 tablet 0   • DENTA 5000 PLUS 1 1 % CREA  (Patient not taking: Reported on 10/17/2022)     • fluticasone (FLONASE) 50 mcg/act nasal spray SPRAY 2 SPRAYS INTO EACH NOSTRIL EVERY DAY (Patient not taking: Reported on 5/1/2023) 48 mL 1   • ofloxacin (OCUFLOX) 0 3 % ophthalmic solution Administer 1 drop into the left eye 4 (four) times a day (Patient not taking: Reported on 10/17/2022) 5 mL 0     No current facility-administered medications for this visit  Allergies   Allergen Reactions   • Iodine - Food Allergy Shortness Of Breath     Other reaction(s): Respiratory Distress  Other reaction(s): Respiratory Distress   • Omnipaque [Iohexol] Anaphylaxis   • Erythromycin Hives     Other reaction(s): Other (See Comments)  Pleurisy   Pleurisy   Other reaction(s): Other (See Comments)  Pleurisy    • Iodixanol    • Lisinopril Cough   • Statins Myalgia      Immunizations:     Immunization History   Administered Date(s) Administered   • COVID-19 MODERNA VACC 0 5 ML IM 02/06/2021, 03/07/2021, 10/28/2021   • COVID-19 PFIZER VACCINE 0 3 ML IM 05/14/2022   • Td (adult), adsorbed 08/23/2017   • Tdap 05/22/2018   • Zoster Vaccine Recombinant 08/04/2022      Health Maintenance:         Topic Date Due   • Colorectal Cancer Screening  11/14/2023   • Hepatitis C Screening  Completed         Topic Date Due   • Pneumococcal Vaccine: 65+ Years (1 - PCV) Never done   • COVID-19 Vaccine (5 - Booster for Magy Jacob series) 07/09/2022      Medicare Screening Tests and Risk Assessments:     Rosita Appiah is here for his Subsequent Wellness visit  Health Risk Assessment:   Patient rates overall health as very good  Patient feels that their physical health rating is same  Patient is very satisfied with their life  Eyesight was rated as same  Hearing was rated as same  Patient feels that their emotional and mental health rating is same  Patients states they are never, rarely angry  Patient states they are never, rarely unusually tired/fatigued  Pain experienced in the last 7 days has been none  Patient states that he has experienced weight loss or gain in last 6 months  Depression Screening:   PHQ-2 Score: 0      Fall Risk Screening: In the past year, patient has experienced: no history of falling in past year      Home Safety:  Patient does not have trouble with stairs inside or outside of their home   Patient has working "smoke alarms and has working carbon monoxide detector  Home safety hazards include: none  Nutrition:   Current diet is Regular  Medications:   Patient is not currently taking any over-the-counter supplements  Patient is able to manage medications  Activities of Daily Living (ADLs)/Instrumental Activities of Daily Living (IADLs):   Walk and transfer into and out of bed and chair?: Yes  Dress and groom yourself?: Yes    Bathe or shower yourself?: Yes    Feed yourself? Yes  Do your laundry/housekeeping?: Yes  Manage your money, pay your bills and track your expenses?: Yes  Make your own meals?: Yes    Do your own shopping?: Yes    Previous Hospitalizations:   Any hospitalizations or ED visits within the last 12 months?: No      Advance Care Planning:   Living will: No    Advanced directive: Yes    Advanced directive counseling given: Yes    End of Life Decisions reviewed with patient: Yes      PREVENTIVE SCREENINGS      Cardiovascular Screening:    General: Screening Not Indicated and History Lipid Disorder      Diabetes Screening:     General: Screening Current      Colorectal Cancer Screening:     General: Screening Current      Abdominal Aortic Aneurysm (AAA) Screening:    Risk factors include: age between 73-69 yo        Lung Cancer Screening:     General: Screening Not Indicated      Hepatitis C Screening:    General: Screening Current    Screening, Brief Intervention, and Referral to Treatment (SBIRT)    Screening  Typical number of drinks in a day: 0  Typical number of drinks in a week: 0  Interpretation: Low risk drinking behavior  No results found  Physical Exam:     /82   Pulse 68   Ht 5' 5\" (1 651 m)   Wt 104 kg (230 lb 3 2 oz)   SpO2 97%   BMI 38 31 kg/m²     Physical Exam  Vitals and nursing note reviewed  Constitutional:       General: He is not in acute distress  Appearance: Normal appearance  He is well-developed  He is obese   He is not ill-appearing, toxic-appearing " or diaphoretic  HENT:      Head: Normocephalic and atraumatic  Right Ear: External ear normal       Left Ear: External ear normal       Nose: Nose normal    Eyes:      Pupils: Pupils are equal, round, and reactive to light  Cardiovascular:      Rate and Rhythm: Normal rate and regular rhythm  Heart sounds: Normal heart sounds  No murmur heard  Pulmonary:      Effort: Pulmonary effort is normal       Breath sounds: Normal breath sounds  Abdominal:      General: There is no distension  Palpations: Abdomen is soft  Tenderness: There is no abdominal tenderness  There is no guarding  Neurological:      Mental Status: He is alert            Dewey Velázquez, DO

## 2023-05-13 DIAGNOSIS — I10 ESSENTIAL HYPERTENSION: ICD-10-CM

## 2023-05-13 PROBLEM — Z00.00 MEDICARE ANNUAL WELLNESS VISIT, SUBSEQUENT: Status: ACTIVE | Noted: 2023-05-13

## 2023-05-13 RX ORDER — VALSARTAN 320 MG/1
TABLET ORAL
Qty: 90 TABLET | Refills: 1 | Status: SHIPPED | OUTPATIENT
Start: 2023-05-13

## 2023-05-14 NOTE — ASSESSMENT & PLAN NOTE
Assessment and plan 1  Medicare subsequent annual wellness examination overall the patient is clinically stable and doing well, we encouraged the patient to follow a healthy and balanced diet  We recommend that the patient exercise routinely approximately 30 minutes 5 times per week   We have reviewed the patient's vaccines and have made recommendations for updates if necessary    annual flu shot  We will be ordering screening laboratories which are age appropriate  Return to the office in   6 months   call if any problems

## 2023-05-14 NOTE — ASSESSMENT & PLAN NOTE
Clinically stable doing well currently on Betapace, aspirin, Xarelto patient sees cardiology routinely he is currently stable and doing well

## 2023-05-15 DIAGNOSIS — K22.710 BARRETT'S ESOPHAGUS WITH LOW GRADE DYSPLASIA: ICD-10-CM

## 2023-05-15 RX ORDER — PANTOPRAZOLE SODIUM 20 MG/1
20 TABLET, DELAYED RELEASE ORAL DAILY
Qty: 90 TABLET | Refills: 0 | Status: SHIPPED | OUTPATIENT
Start: 2023-05-15

## 2023-05-15 NOTE — TELEPHONE ENCOUNTER
Medication Refill Request     Name pantoprazole (PROTONIX)  Dose/Frequency   20 mg tablet TAKE 1 TABLET BY MOUTH EVERY DAY       Quantity 90  Verified pharmacy   [x]  Verified ordering Provider   [x]  Does patient have enough for the next 3 days?  Yes [] No [x]

## 2023-07-12 PROBLEM — Z00.00 MEDICARE ANNUAL WELLNESS VISIT, SUBSEQUENT: Status: RESOLVED | Noted: 2023-05-13 | Resolved: 2023-07-12

## 2023-08-09 DIAGNOSIS — K22.710 BARRETT'S ESOPHAGUS WITH LOW GRADE DYSPLASIA: ICD-10-CM

## 2023-08-09 RX ORDER — PANTOPRAZOLE SODIUM 20 MG/1
20 TABLET, DELAYED RELEASE ORAL DAILY
Qty: 30 TABLET | Refills: 0 | OUTPATIENT
Start: 2023-08-09

## 2023-08-10 ENCOUNTER — OFFICE VISIT (OUTPATIENT)
Dept: GASTROENTEROLOGY | Facility: CLINIC | Age: 68
End: 2023-08-10
Payer: COMMERCIAL

## 2023-08-10 VITALS
WEIGHT: 241 LBS | TEMPERATURE: 98.9 F | SYSTOLIC BLOOD PRESSURE: 122 MMHG | DIASTOLIC BLOOD PRESSURE: 68 MMHG | HEIGHT: 65 IN | BODY MASS INDEX: 40.15 KG/M2

## 2023-08-10 DIAGNOSIS — K22.710 BARRETT'S ESOPHAGUS WITH LOW GRADE DYSPLASIA: Primary | ICD-10-CM

## 2023-08-10 DIAGNOSIS — D12.6 TUBULAR ADENOMA OF COLON: ICD-10-CM

## 2023-08-10 PROCEDURE — 99214 OFFICE O/P EST MOD 30 MIN: CPT | Performed by: INTERNAL MEDICINE

## 2023-08-10 RX ORDER — PANTOPRAZOLE SODIUM 20 MG/1
20 TABLET, DELAYED RELEASE ORAL DAILY
Qty: 90 TABLET | Refills: 3 | Status: SHIPPED | OUTPATIENT
Start: 2023-08-10

## 2023-08-10 RX ORDER — BISACODYL 5 MG/1
10 TABLET, DELAYED RELEASE ORAL ONCE
Qty: 2 TABLET | Refills: 0 | Status: SHIPPED | OUTPATIENT
Start: 2023-08-10 | End: 2023-08-10

## 2023-08-10 NOTE — PROGRESS NOTES
[facility] Visit Note  Vannessa Prince 70 y.o. male   MRN: [id number]    Assessment and Plan      Diagnoses and all orders for this visit:    Alvares's esophagus with low grade dysplasia  -     EGD; Future  -     pantoprazole (PROTONIX) 20 mg tablet; Take 1 tablet (20 mg total) by mouth daily    Tubular adenoma of colon  -     Colonoscopy; Future  -     bisacodyl (DULCOLAX) 5 mg EC tablet; Take 2 tablets (10 mg total) by mouth once for 1 dose  -     polyethylene glycol (GOLYTELY) 4000 mL solution; Take 4,000 mL by mouth once for 1 dose    Other orders  -     Diet NPO; Sips with meds; Standing  -     Void on call to OR; Standing  -     Insert peripheral IV; Standing  -     Diet NPO; Sips with meds; Standing  -     Void on call to OR; Standing  -     Insert peripheral IV; Standing      62-year-old gentleman here for refill of his Protonix, history of Alvares's esophagus status post ablation with repeat EGD in 2021 with resolution of Alvares's and repeat EGD recommended in 1 to 2 years. History of 1 cm colonic polyp on colonoscopy 5 years ago with Dr. Juma Molina with recommended repeat in 5 years. · He needs a repeat EGD for Alvares's surveillance. Will schedule at this time. Also due for colonoscopy. · He would like to have EGD and colonoscopy performed at the same time for convenience, we will schedule him for both of these with GI. · Will refill pantoprazole milligrams daily        Chief Complaint: I need a refill on my Protonix  Subjective     History of Present Illness:  Vannessa Prince is a 70 y.o. male with a past medical history of Alvares's esophagus status post ablation, hypertension, A-fib/flutter on Xarelto, VIRGEN, prediabetes. She was initially diagnosed with Alvares's esophagus via EGD in 2019 and underwent ablation at that time.   Follow up EGD in 2021 showed no evidence of Alvares's but did show some at the Z-line with biopsies showing  squamous and cardiac mucosa suggestive of chronic inflammation. Hpylori negative. There was a gastric antral nodule as well consistent with lipoma seen on previous EGD. Otherwise normal study. Recommendation was for repeat EGD in 1 to 2 years. His last colonoscopy was done on 11/14/2018 and 1 cm polyp with recommendation for repeat in 5 years. Patient is maintained on Protonix 20 mg daily needs a refill for this. He also takes Xarelto for his A-fib/flutter. For the planned weight loss of deep pounds that he attributes to diet regiment and exercise. Denies any GERD like symptoms abdominal pain nausea vomiting diarrhea constipation, changes to bowel function or stool caliber. No bloody or tarry stools. Review of Systems   Constitutional: Negative for chills and fever. HENT: Negative for ear pain and sore throat. Eyes: Negative for pain and visual disturbance. Respiratory: Negative for cough and shortness of breath. Cardiovascular: Negative for chest pain and palpitations. Gastrointestinal: Negative for abdominal pain, anal bleeding, blood in stool, constipation, diarrhea, nausea and vomiting. Genitourinary: Negative for dysuria and hematuria. Musculoskeletal: Negative for arthralgias and back pain. Skin: Negative for color change and rash. Neurological: Negative for seizures and syncope. All other systems reviewed and are negative.         Current Outpatient Medications:   •  amLODIPine (NORVASC) 10 mg tablet, TAKE 1 TABLET BY MOUTH EVERY DAY, Disp: 90 tablet, Rfl: 1  •  aspirin 81 MG tablet, Take 81 mg by mouth, Disp: , Rfl:   •  calcium carbonate (TUMS) 500 mg chewable tablet, Chew 2 tablets every 4 (four) hours as needed , Disp: , Rfl:   •  Coenzyme Q10 (COQ-10) 100 MG CAPS, Take 100 mg by mouth daily , Disp: , Rfl:   •  ezetimibe (ZETIA) 10 mg tablet, TAKE 1 TABLET BY MOUTH EVERY DAY, Disp: 90 tablet, Rfl: 3  •  furosemide (LASIX) 20 mg tablet, TAKE 1 TABLET (20 MG TOTAL) BY MOUTH DAILY., Disp: 90 tablet, Rfl: 3  •  Klor-Con M10 10 MEQ tablet, TAKE 2 TABLETS BY MOUTH DAILY, Disp: 180 tablet, Rfl: 3  •  multivitamin (THERAGRAN) TABS, Take 1 tablet by mouth Gummy form, Disp: , Rfl:   •  Omega-3 Fatty Acids (FISH OIL PO), Take 1 capsule by mouth daily Gummy form, Disp: , Rfl:   •  pantoprazole (PROTONIX) 20 mg tablet, Take 1 tablet (20 mg total) by mouth daily, Disp: 90 tablet, Rfl: 0  •  sotalol (BETAPACE) 80 mg tablet, Take 80 mg by mouth 2 (two) times a day Take half tablet 2 times a day, Disp: , Rfl:   •  valsartan (DIOVAN) 320 MG tablet, TAKE 1 TABLET BY MOUTH EVERY DAY, Disp: 90 tablet, Rfl: 1  •  XARELTO 20 MG tablet, TAKE 1 TABLET BY MOUTH ONCE NDAILY WITH DINNER, Disp: 30 tablet, Rfl: 0  •  DENTA 5000 PLUS 1.1 % CREA, , Disp: , Rfl:   •  fluticasone (FLONASE) 50 mcg/act nasal spray, SPRAY 2 SPRAYS INTO EACH NOSTRIL EVERY DAY (Patient not taking: Reported on 5/1/2023), Disp: 48 mL, Rfl: 1  •  ofloxacin (OCUFLOX) 0.3 % ophthalmic solution, Administer 1 drop into the left eye 4 (four) times a day (Patient not taking: Reported on 10/17/2022), Disp: 5 mL, Rfl: 0  Past Medical History:   Diagnosis Date   • Allergic rhinitis due to pollen     last assessed 10/01/15   • Aortic aneurysm (HCC)     4.3 cm  6/2018 last check   • Alvares esophagus    • CPAP (continuous positive airway pressure) dependence    • Diabetes (HCC)    • GERD (gastroesophageal reflux disease)    • Hyperlipidemia    • Hypertension    • Irregular heart beat    • Mild intermittent asthma without complication     last assessed 10/01/15   • Positive PPD     last assesssed 12/21/15   • Sleep apnea    • Stroke [facility])     unsure if/when happened; no weakness   • Syncopal episodes     1 year ago   • Vertigo     last assessed 04/24/17     Past Surgical History:   Procedure Laterality Date   • CARDIAC CATHETERIZATION     • COLONOSCOPY N/A 11/14/2018    Procedure: COLONOSCOPY;  Surgeon: Rigo Rae MD;  [facility]; Service: Colorectal   • EGD AND COLONOSCOPY     • ENDOSCOPIC ULTRASOUND (LOWER)  01/23/2020   • HERNIA REPAIR     • JOINT REPLACEMENT Left     knee   • MULTIPLE TOOTH EXTRACTIONS  05/2020   • TOTAL KNEE ARTHROPLASTY  08/29/2016    Dr Justin Morillo 07/28/16   • UPPER GASTROINTESTINAL ENDOSCOPY  10/21/2019   • URETHRA SURGERY      polyps removed     Social History     Socioeconomic History   • Marital status: /Civil Union     Spouse name: Not on file   • Number of children: 2   • Years of education: Not on file   • Highest education level: Not on file   Occupational History   • Not on file   Tobacco Use   • Smoking status: Never   • Smokeless tobacco: Never   Vaping Use   • Vaping Use: Never used   Substance and Sexual Activity   • Alcohol use: Never   • Drug use: No   • Sexual activity: Yes   Other Topics Concern   • Not on file   Social History Narrative    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[address] [facility] Strain: [address]  (5/12/2023)    Overall Financial Resource Strain (CARDIA)    • Difficulty of Paying Living Expenses: Not hard at all   Food Insecurity: Not on file   Transportation Needs: No Transportation Needs (5/12/2023)    PRAPARE - Transportation    • Lack of Transportation (Medical): No    • Lack of Transportation (Non-Medical):  No   Physical Activity: Not on file   Stress: Not on file   Social Connections: Not on file   Intimate Partner Violence: Not on file   Housing Stability: Not on file     Family History   Problem Relation Age of Onset   • Heart disease Mother    • Hypertension Mother    • Coronary artery disease Father    • Hypertension Father    • Heart disease Father         cardiac disorder   • Stomach cancer Maternal Grandmother    • Thyroid disease Neg Hx      Allergies   Allergen Reactions   • Iodine - Food Allergy Shortness Of Breath     Other reaction(s): Respiratory Distress  Other reaction(s): Respiratory Distress   • Omnipaque [Iohexol] Anaphylaxis   • Erythromycin Hives     Other reaction(s): Other (See Comments)  Pleurisy   Pleurisy   Other reaction(s): Other (See Comments)  Pleurisy    • Iodixanol    • Lisinopril Cough   • Statins Myalgia       Objective     Vitals:    08/10/23 1423   BP: 122/68   BP Location: Left arm   Patient Position: Sitting   Cuff Size: Extra-Large   Temp: 98.9 °F (37.2 °C)   TempSrc: Tympanic   Weight: 109 kg (241 lb)   Height: 5' 5" (1.651 m)       Physical exam:     GENERAL: NAD  HEENT:  NC/AT, PERRL, EOMI, no scleral icterus  CARDIAC:  RRR, +S1/S2, no S3/S4 heard, no m/g/r  PULMONARY:  CTA B/L, no wheezing/rales/rhonci, non-labored breathing  ABDOMEN:  Soft, NT/ND,no rebound/guarding/rigidity  Extremities:. No edema, cyanosis, or clubbing  NEUROLOGIC: Grossly intact. SKIN:  No rashes or erythema noted on exposed skin. Psych: Normal affect      Franko Lou MD   PGY-3 Internal Medicine  [facility]    ==  PLEASE NOTE:  This encounter was completed utilizing the [employer] Direct Speech Voice Recognition Software. Grammatical errors, random word insertions, pronoun errors and incomplete sentences are occasional consequences of the system due to software limitations, ambient noise and hardware issues. These may be missed by proof reading prior to affixing electronic signature. Any questions or concerns about the content, text or information contained within the body of this dictation should be directly addressed to the physician for clarification. Please do not hesitate to call me directly if you have any any questions or concerns.

## 2023-08-11 ENCOUNTER — TELEPHONE (OUTPATIENT)
Age: 68
End: 2023-08-11

## 2023-08-11 ENCOUNTER — TELEPHONE (OUTPATIENT)
Dept: GASTROENTEROLOGY | Facility: CLINIC | Age: 68
End: 2023-08-11

## 2023-08-11 NOTE — TELEPHONE ENCOUNTER
Scheduled date of EGD/colonoscopy (as of today):10/20/2023    Physician performing EGD/colonoscopy:Kemal Moseley      Location of EGD/colonoscopy:[facility]    Desired bowel prep reviewed with patient:yes    Instructions reviewed with patient by:ari coe Clearances:  Please let us know if Xarelto can be held ASAP     Thank you     *was on phone with pt call disconnected. Tried to reach pt back no luck.

## 2023-08-11 NOTE — PATIENT INSTRUCTIONS
Scheduled date of EGD/colonoscopy (as of today):09/28/2023  Physician performing EGD/colonoscopy:Pradip  Location of EGD/colonoscopy: [facility]  Desired bowel prep reviewed with patient: sakshi maynard  Instructions reviewed with patient by: Nicole Crowley , sent in the mail  Clearances:   karan Roland for clearance hold    Patient will get recall for 1 year follow up

## 2023-08-11 NOTE — TELEPHONE ENCOUNTER
Our mutual patient is scheduled for procedure: Colonoscopy and EGD    On: 09/28/2023     With: Dr. Tete Suárez    He is taking the following blood thinner: xarelto    Can this be stopped  2 days prior to the procedure    Physician Approving clearance:       Please let us know if Xarelto can be held ASAP    Thank you

## 2023-08-19 DIAGNOSIS — I10 PRIMARY HYPERTENSION: ICD-10-CM

## 2023-08-19 RX ORDER — AMLODIPINE BESYLATE 10 MG/1
TABLET ORAL
Qty: 90 TABLET | Refills: 1 | Status: SHIPPED | OUTPATIENT
Start: 2023-08-19

## 2023-08-25 ENCOUNTER — TELEPHONE (OUTPATIENT)
Dept: GASTROENTEROLOGY | Facility: CLINIC | Age: 68
End: 2023-08-25

## 2023-08-25 NOTE — TELEPHONE ENCOUNTER
Our mutual patient is scheduled for procedure: Colon /EGD    On: 10/02/2023     With: Dr. Chelita Oviedo    He is taking the following blood thinner: Xarelto    Can this be stopped  2 days prior to the procedure    Physician Approving clearance:   Please review and let me know about clearance .     Thank you     Approved to hold , called patient and informed

## 2023-09-14 ENCOUNTER — OFFICE VISIT (OUTPATIENT)
Dept: INTERNAL MEDICINE CLINIC | Facility: CLINIC | Age: 68
End: 2023-09-14
Payer: COMMERCIAL

## 2023-09-14 VITALS
WEIGHT: 245.2 LBS | RESPIRATION RATE: 16 BRPM | DIASTOLIC BLOOD PRESSURE: 74 MMHG | HEIGHT: 65 IN | BODY MASS INDEX: 40.85 KG/M2 | SYSTOLIC BLOOD PRESSURE: 122 MMHG | HEART RATE: 58 BPM | OXYGEN SATURATION: 97 %

## 2023-09-14 DIAGNOSIS — I10 PRIMARY HYPERTENSION: ICD-10-CM

## 2023-09-14 DIAGNOSIS — R41.3 MEMORY CHANGE: Primary | ICD-10-CM

## 2023-09-14 DIAGNOSIS — G47.33 OSA ON CPAP: ICD-10-CM

## 2023-09-14 DIAGNOSIS — Z99.89 OSA ON CPAP: ICD-10-CM

## 2023-09-14 DIAGNOSIS — E66.01 MORBID OBESITY (HCC): ICD-10-CM

## 2023-09-14 PROCEDURE — 99212 OFFICE O/P EST SF 10 MIN: CPT | Performed by: INTERNAL MEDICINE

## 2023-09-14 NOTE — PROGRESS NOTES
Name: Hever Fenton      : 1955      MRN: [id number]  Encounter Provider: Jodi Joya MD  Encounter Date: 2023   Encounter department: [facility]     1. Memory change  Assessment & Plan:  Has been noticing that he will have conversation where he will describe that word but forget the name of the word. Started noticing 1 month ago. 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 Worked  until last year working at [employer]. Interested getting his memory tested. When talking, sometimes stop during conversion to remember what to say. Wife said that he repeats questions. Plan:   · B12, Folate , TSH, Syphlis  · MRI brain neuroQuant without contrast          Orders:  -     TSH, 3rd generation with Free T4 reflex; Future  -     Vitamin B12; Future  -     Folate; Future  -     HEMOGLOBIN A1C W/ EAG ESTIMATION; Future  -     RPR-Syphilis Screening (Total Syphilis IGG/IGM); Future  -     MRI brain NeuroQuant wo contrast; Future; Expected date: 2023    2. Morbid obesity ([address])  Assessment & Plan:  Last Assessment & Plan: Body mass index is 40.80 kg/m² now  Lost weigh but regain 20 lb   Twisted back so exercising less   See chiropractor for back adjustment once a week when its bad or every other month  Continues to swim two times every week. Plan:   · Encouraged increase exercise, activity, weight loss. ·       3. VIRGEN on CPAP  Assessment & Plan:  No breakthrough snoring on CPAP machine  Sleeps 5 hours at night but naps for extra 2 hours in the day due to prior night shift work schedule     Plan:  · Continue to use CPAP  · Encourage better sleep hygiene       4.  Primary hypertension  Assessment & Plan:  Blood pressure has been well controlled with current medications    Plan:  · No medication adjustment  · Continue with current treatment plan           Subjective      Mr. Conner Sherman is a 70year old male with past medical history of hypertension, Afib, and obesity who came in for a follow-up. For the past 2 month, he has been  having memory block where he has difficulties remembering words. He can describe the word but can not say the exact word. One example is when his friend told him about being diagnosed with Alvares esophagus. Patient states that every now and then he will describe everything about Alvares esophagus but has difficulties remembering the word. His wife was on the phone, and stated that he sometimes forgets his granddaughter name. Patient also watches  the same T.V. series overtime because of how much they remind him of his work experiences. Patient notices his word block more than his family. According to patient, this has been abrupt but has progressively worsened. It is worst when he is tired. Patient has also gained an extra 20 lb but is now on nutrisystem meal prep for diabetics. He denied chest pain, palpitation, shortness of breath but does have occasional back pain. He  sees a chiropractor to have his back adjusted as need. Review of Systems   Constitutional: Negative for chills and fever. HENT: Negative for ear pain and sore throat. Eyes: Negative for pain and visual disturbance. Respiratory: Negative for cough and shortness of breath. Cardiovascular: Negative for chest pain and palpitations. Gastrointestinal: Negative for abdominal pain and vomiting. Genitourinary: Negative for dysuria and hematuria. Musculoskeletal: Positive for back pain. Negative for arthralgias. Skin: Negative for color change and rash. Neurological: Negative for seizures and syncope. All other systems reviewed and are negative.       Current Outpatient Medications on File Prior to Visit   Medication Sig   • amLODIPine (NORVASC) 10 mg tablet TAKE 1 TABLET BY MOUTH EVERY DAY   • aspirin 81 MG tablet Take 81 mg by mouth   • calcium carbonate (TUMS) 500 mg chewable tablet Chew 2 tablets every 4 (four) hours as needed    • Coenzyme Q10 (COQ-10) 100 MG CAPS Take 100 mg by mouth daily    • ezetimibe (ZETIA) 10 mg tablet TAKE 1 TABLET BY MOUTH EVERY DAY   • furosemide (LASIX) 20 mg tablet TAKE 1 TABLET (20 MG TOTAL) BY MOUTH DAILY. • Klor-Con M10 10 MEQ tablet TAKE 2 TABLETS BY MOUTH DAILY   • multivitamin (THERAGRAN) TABS Take 1 tablet by mouth Gummy form   • Omega-3 Fatty Acids (FISH OIL PO) Take 1 capsule by mouth daily Gummy form   • pantoprazole (PROTONIX) 20 mg tablet Take 1 tablet (20 mg total) by mouth daily   • sotalol (BETAPACE) 80 mg tablet Take 80 mg by mouth 2 (two) times a day Take half tablet 2 times a day   • valsartan (DIOVAN) 320 MG tablet TAKE 1 TABLET BY MOUTH EVERY DAY   • XARELTO 20 MG tablet TAKE 1 TABLET BY MOUTH ONCE NDAILY WITH DINNER   • bisacodyl (DULCOLAX) 5 mg EC tablet Take 2 tablets (10 mg total) by mouth once for 1 dose   • DENTA 5000 PLUS 1.1 % CREA  (Patient not taking: Reported on 10/17/2022)   • fluticasone (FLONASE) 50 mcg/act nasal spray SPRAY 2 SPRAYS INTO EACH NOSTRIL EVERY DAY (Patient not taking: Reported on 5/1/2023)   • ofloxacin (OCUFLOX) 0.3 % ophthalmic solution Administer 1 drop into the left eye 4 (four) times a day (Patient not taking: Reported on 10/17/2022)   • polyethylene glycol (GOLYTELY) 4000 mL solution Take 4,000 mL by mouth once for 1 dose       Objective     /74   Pulse 58   Resp 16   Ht 5' 5" (1.651 m)   Wt 111 kg (245 lb 3.2 oz)   SpO2 97%   BMI 40.80 kg/m²     Physical Exam  Constitutional:       Appearance: He is obese. HENT:      Mouth/Throat:      Mouth: Mucous membranes are moist.   Cardiovascular:      Rate and Rhythm: Normal rate and regular rhythm. Pulses: Normal pulses. Heart sounds: Normal heart sounds. Pulmonary:      Effort: Pulmonary effort is normal.      Breath sounds: Normal breath sounds. Abdominal:      General: Bowel sounds are normal. There is distension.       Palpations: Abdomen is soft.   Skin:     General: Skin is warm. Neurological:      Mental Status: He is alert and oriented to person, place, and time.    Psychiatric:         Mood and Affect: Mood normal.         Behavior: Behavior normal.       Gayle Cloud MD

## 2023-09-14 NOTE — ASSESSMENT & PLAN NOTE
Has been noticing that he will have conversation where he will describe that word but forget the name of the word. Started noticing 1 month ago. 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 Worked 2011 until last year working at [employer]. Interested getting his memory tested. When talking, sometimes stop during conversion to remember what to say. Wife said that he repeats questions.      Plan:   · B12, Folate , TSH, Syphlis  · MRI brain neuroQuant without contrast

## 2023-09-14 NOTE — ASSESSMENT & PLAN NOTE
Last Assessment & Plan: Body mass index is 40.80 kg/m² now  Lost weigh but regain 20 lb   Twisted back so exercising less   See chiropractor for back adjustment once a week when its bad or every other month  Continues to swim two times every week. Plan:   · Encouraged increase exercise, activity, weight loss.   · Continue with current Nutrisystem diabetic weight loss plan

## 2023-09-14 NOTE — ASSESSMENT & PLAN NOTE
No breakthrough snoring on CPAP machine  Sleeps 5 hours at night but naps for extra 2 hours in the day due to prior night shift work schedule     Plan:  · Continue to use CPAP  · Encourage better sleep hygiene

## 2023-09-14 NOTE — ASSESSMENT & PLAN NOTE
Blood pressure has been well controlled with current medications    Plan:  · No medication adjustment  · Continue with current treatment plan

## 2023-09-22 ENCOUNTER — TELEPHONE (OUTPATIENT)
Age: 68
End: 2023-09-22

## 2023-09-22 ENCOUNTER — APPOINTMENT (OUTPATIENT)
Dept: LAB | Age: 68
End: 2023-09-22
Payer: COMMERCIAL

## 2023-09-22 DIAGNOSIS — R41.3 MEMORY CHANGE: ICD-10-CM

## 2023-09-22 DIAGNOSIS — R89.9 ABNORMAL LABORATORY TEST: ICD-10-CM

## 2023-09-22 DIAGNOSIS — Z12.5 SCREENING PSA (PROSTATE SPECIFIC ANTIGEN): ICD-10-CM

## 2023-09-22 LAB
EST. AVERAGE GLUCOSE BLD GHB EST-MCNC: 123 MG/DL
FOLATE SERPL-MCNC: 12.1 NG/ML
HBA1C MFR BLD: 5.9 %
PSA SERPL-MCNC: 1.1 NG/ML (ref 0–4)
TREPONEMA PALLIDUM IGG+IGM AB [PRESENCE] IN SERUM OR PLASMA BY IMMUNOASSAY: NORMAL
TSH SERPL DL<=0.05 MIU/L-ACNC: 4.08 UIU/ML (ref 0.45–4.5)
VIT B12 SERPL-MCNC: 381 PG/ML (ref 180–914)

## 2023-09-22 PROCEDURE — 83036 HEMOGLOBIN GLYCOSYLATED A1C: CPT

## 2023-09-22 PROCEDURE — 84165 PROTEIN E-PHORESIS SERUM: CPT

## 2023-09-22 PROCEDURE — 86780 TREPONEMA PALLIDUM: CPT

## 2023-09-22 PROCEDURE — G0103 PSA SCREENING: HCPCS

## 2023-09-22 PROCEDURE — 36415 COLL VENOUS BLD VENIPUNCTURE: CPT

## 2023-09-22 PROCEDURE — 82607 VITAMIN B-12: CPT

## 2023-09-22 PROCEDURE — 84443 ASSAY THYROID STIM HORMONE: CPT

## 2023-09-22 PROCEDURE — 82746 ASSAY OF FOLIC ACID SERUM: CPT

## 2023-09-22 NOTE — TELEPHONE ENCOUNTER
Pt. Called in he requested an order for a sedative prior to having the MRI as he is very claustrophobic.    Please call to let him know when order has been  been processed as the MRI Dept won't administer the sedative without an order in place.    MRI DATE IS ON OCTOBER 4TH

## 2023-09-25 NOTE — TELEPHONE ENCOUNTER
Spoke to patient and advised. He is agreeable to starting the Ativan. Please send to the [facility] on [address]. I am not able to put this in.     PDMP checked and ok.

## 2023-09-26 LAB
ALBUMIN SERPL ELPH-MCNC: 3.79 G/DL (ref 3.2–5.1)
ALBUMIN SERPL ELPH-MCNC: 54.2 % (ref 48–70)
ALBUMIN UR ELPH-MCNC: 100 %
ALPHA1 GLOB MFR UR ELPH: 0 %
ALPHA1 GLOB SERPL ELPH-MCNC: 0.22 G/DL (ref 0.15–0.47)
ALPHA1 GLOB SERPL ELPH-MCNC: 3.1 % (ref 1.8–7)
ALPHA2 GLOB MFR UR ELPH: 0 %
ALPHA2 GLOB SERPL ELPH-MCNC: 0.69 G/DL (ref 0.42–1.04)
ALPHA2 GLOB SERPL ELPH-MCNC: 9.9 % (ref 5.9–14.9)
B-GLOBULIN MFR UR ELPH: 0 %
BETA GLOB ABNORMAL SERPL ELPH-MCNC: 0.44 G/DL (ref 0.31–0.57)
BETA1 GLOB SERPL ELPH-MCNC: 6.3 % (ref 4.7–7.7)
BETA2 GLOB SERPL ELPH-MCNC: 8 % (ref 3.1–7.9)
BETA2+GAMMA GLOB SERPL ELPH-MCNC: 0.56 G/DL (ref 0.2–0.58)
GAMMA GLOB ABNORMAL SERPL ELPH-MCNC: 1.3 G/DL (ref 0.4–1.66)
GAMMA GLOB MFR UR ELPH: 0 %
GAMMA GLOB SERPL ELPH-MCNC: 18.5 % (ref 6.9–22.3)
IGG/ALB SER: 1.18 {RATIO} (ref 1.1–1.8)
PROT PATTERN SERPL ELPH-IMP: ABNORMAL
PROT PATTERN UR ELPH-IMP: NORMAL
PROT SERPL-MCNC: 7 G/DL (ref 6.4–8.4)
PROT UR-MCNC: 4.2 MG/DL

## 2023-09-26 PROCEDURE — 84166 PROTEIN E-PHORESIS/URINE/CSF: CPT | Performed by: PATHOLOGY

## 2023-09-26 PROCEDURE — 84165 PROTEIN E-PHORESIS SERUM: CPT | Performed by: PATHOLOGY

## 2023-09-28 DIAGNOSIS — F40.240 CLAUSTROPHOBIA: Primary | ICD-10-CM

## 2023-09-28 RX ORDER — SODIUM CHLORIDE 9 MG/ML
125 INJECTION, SOLUTION INTRAVENOUS CONTINUOUS
Status: CANCELLED | OUTPATIENT
Start: 2023-09-28

## 2023-09-28 RX ORDER — LORAZEPAM 0.5 MG/1
0.5 TABLET ORAL DAILY PRN
Qty: 1 TABLET | Refills: 0 | Status: SHIPPED | OUTPATIENT
Start: 2023-09-28

## 2023-10-02 ENCOUNTER — ANESTHESIA EVENT (OUTPATIENT)
Dept: GASTROENTEROLOGY | Facility: MEDICAL CENTER | Age: 68
End: 2023-10-02

## 2023-10-02 ENCOUNTER — HOSPITAL ENCOUNTER (OUTPATIENT)
Dept: GASTROENTEROLOGY | Facility: MEDICAL CENTER | Age: 68
Setting detail: OUTPATIENT SURGERY
Discharge: HOME | End: 2023-10-02
Attending: INTERNAL MEDICINE
Payer: COMMERCIAL

## 2023-10-02 ENCOUNTER — ANESTHESIA (OUTPATIENT)
Dept: GASTROENTEROLOGY | Facility: MEDICAL CENTER | Age: 68
End: 2023-10-02

## 2023-10-02 VITALS
WEIGHT: 244.71 LBS | HEIGHT: 65 IN | TEMPERATURE: 97.9 F | DIASTOLIC BLOOD PRESSURE: 67 MMHG | SYSTOLIC BLOOD PRESSURE: 116 MMHG | OXYGEN SATURATION: 17 % | HEART RATE: 63 BPM | RESPIRATION RATE: 20 BRPM | BODY MASS INDEX: 40.77 KG/M2

## 2023-10-02 DIAGNOSIS — K22.710 BARRETT'S ESOPHAGUS WITH LOW GRADE DYSPLASIA: ICD-10-CM

## 2023-10-02 DIAGNOSIS — D12.6 TUBULAR ADENOMA OF COLON: ICD-10-CM

## 2023-10-02 PROCEDURE — 88305 TISSUE EXAM BY PATHOLOGIST: CPT | Performed by: PATHOLOGY

## 2023-10-02 RX ORDER — SODIUM CHLORIDE 9 MG/ML
125 INJECTION, SOLUTION INTRAVENOUS CONTINUOUS
Status: DISCONTINUED | OUTPATIENT
Start: 2023-10-02 | End: 2023-10-06 | Stop reason: HOSPADM

## 2023-10-02 RX ORDER — PROPOFOL 10 MG/ML
INJECTION, EMULSION INTRAVENOUS AS NEEDED
Status: DISCONTINUED | OUTPATIENT
Start: 2023-10-02 | End: 2023-10-02

## 2023-10-02 RX ORDER — LIDOCAINE HYDROCHLORIDE 20 MG/ML
INJECTION, SOLUTION EPIDURAL; INFILTRATION; INTRACAUDAL; PERINEURAL AS NEEDED
Status: DISCONTINUED | OUTPATIENT
Start: 2023-10-02 | End: 2023-10-02

## 2023-10-02 RX ADMIN — PROPOFOL 30 MG: 10 INJECTION, EMULSION INTRAVENOUS at 08:09

## 2023-10-02 RX ADMIN — PROPOFOL 30 MG: 10 INJECTION, EMULSION INTRAVENOUS at 07:54

## 2023-10-02 RX ADMIN — PROPOFOL 140 MG: 10 INJECTION, EMULSION INTRAVENOUS at 07:34

## 2023-10-02 RX ADMIN — LIDOCAINE HYDROCHLORIDE 5 ML: 20 INJECTION, SOLUTION EPIDURAL; INFILTRATION; INTRACAUDAL at 07:34

## 2023-10-02 RX ADMIN — SODIUM CHLORIDE 125 ML/HR: 0.9 INJECTION, SOLUTION INTRAVENOUS at 07:27

## 2023-10-02 RX ADMIN — PROPOFOL 50 MG: 10 INJECTION, EMULSION INTRAVENOUS at 07:40

## 2023-10-02 RX ADMIN — PROPOFOL 20 MG: 10 INJECTION, EMULSION INTRAVENOUS at 07:43

## 2023-10-02 RX ADMIN — PROPOFOL 30 MG: 10 INJECTION, EMULSION INTRAVENOUS at 07:48

## 2023-10-02 RX ADMIN — PROPOFOL 30 MG: 10 INJECTION, EMULSION INTRAVENOUS at 08:02

## 2023-10-02 NOTE — ANESTHESIA POSTPROCEDURE EVALUATION
Post-Op Assessment Note    CV Status:  Stable    Pain management: adequate     Mental Status:  Alert and awake   Hydration Status:  Euvolemic   PONV Controlled:  Controlled   Airway Patency:  Patent      Post Op Vitals Reviewed: Yes            No notable events documented.     BP      Temp      Pulse     Resp      SpO2     /67   Pulse 63   Temp 97.9 °F (36.6 °C) (Temporal)   Resp 20   Ht 5' 5" (1.651 m)   Wt 111 kg (244 lb 11.4 oz)   SpO2 (!) 17%   BMI 40.72 kg/m²

## 2023-10-02 NOTE — H&P
History and Physical -  [facility]  Mendel Hew 70 y.o. male MRN: [id number]    HPI: Mendel Hew is a 70y.o. year old male who presents with gastric nodule (lipoma)  History of BE with LGD. History of colon polyps - last in 2018. Review of Systems    Historical Information   Past Medical History:   Diagnosis Date   • Allergic rhinitis due to pollen     last assessed 10/01/15   • Aortic aneurysm (HCC)     4.3 cm  6/2018 last check   • Alvares esophagus    • Coronary artery disease    • CPAP (continuous positive airway pressure) dependence    • Diabetes (HCC)    • GERD (gastroesophageal reflux disease)    • History of cardiac cath    • Hyperlipidemia    • Hypertension    • Irregular heart beat     a flutter   • Mild intermittent asthma without complication     last assessed 10/01/15   • Positive PPD     last assesssed 12/21/15   • Pulmonary embolism ([address])    • Sleep apnea    • Stroke [facility])     unsure if/when happened; no weakness   • Syncopal episodes     1 year ago   • Vertigo     last assessed 04/24/17     Past Surgical History:   Procedure Laterality Date   • CARDIAC CATHETERIZATION      with stent   • COLONOSCOPY N/A 11/14/2018    Procedure: COLONOSCOPY;  Surgeon: Nicole Prince MD;  [facility];   Service: Colorectal   • EGD AND COLONOSCOPY     • ENDOSCOPIC ULTRASOUND (LOWER)  01/23/2020   • HERNIA REPAIR     • JOINT REPLACEMENT Left     knee   • MULTIPLE TOOTH EXTRACTIONS  05/2020   • TOTAL KNEE ARTHROPLASTY  08/29/2016    Dr Hira Hernández 07/28/16   • UPPER GASTROINTESTINAL ENDOSCOPY  10/21/2019   • URETHRA SURGERY      polyps removed     Social History   Social History     Substance and Sexual Activity   Alcohol Use Never     Social History     Substance and Sexual Activity   Drug Use No     Social History     Tobacco Use   Smoking Status Never   Smokeless Tobacco Never     Family History   Problem Relation Age of Onset   • Heart disease Mother    • Hypertension Mother • Coronary artery disease Father    • Hypertension Father    • Heart disease Father         cardiac disorder   • Stomach cancer Maternal Grandmother    • Thyroid disease Neg Hx        Meds/Allergies     (Not in a hospital admission)      Allergies   Allergen Reactions   • Iodine - Food Allergy Shortness Of Breath     Other reaction(s): Respiratory Distress  Other reaction(s): Respiratory Distress   • Omnipaque [Iohexol] Anaphylaxis   • Erythromycin Hives     Other reaction(s): Other (See Comments)  Pleurisy   Pleurisy   Other reaction(s): Other (See Comments)  Pleurisy    • Iodixanol    • Lisinopril Cough   • Statins Myalgia       Objective     There were no vitals taken for this visit. PHYSICAL EXAM    Gen: NAD  CV: RRR  CHEST: Clear  ABD: soft, NT/ND  EXT: no edema  Neuro: AAO      ASSESSMENT/PLAN:  This is a 70y.o. year old male here for EGD for BE with LGD and gastric nodule and colon polyps. PLAN:   Procedure: EGD / Colonoscopy.

## 2023-10-02 NOTE — ANESTHESIA PREPROCEDURE EVALUATION
Procedure:  EGD  COLONOSCOPY    Relevant Problems   CARDIO   (+) Atherosclerosis of native coronary artery of native heart without angina pectoris   (+) Atypical atrial flutter (HCC)   (+) HTN (hypertension)   (+) Hyperlipidemia   (+) Nonrheumatic aortic valve insufficiency   (+) Paroxysmal atrial fibrillation (HCC)   (+) Thoracic aortic aneurysm (HCC)      GI/HEPATIC   (+) Gastroesophageal reflux disease without esophagitis/Alvares's esophagus   (+) Rectal bleeding      /RENAL   (+) Horseshoe kidney      MUSCULOSKELETAL   (+) Acute right-sided low back pain without sciatica      PULMONARY   (+) Asthma   (+) Mild intermittent asthma without complication   (+) VIRGEN (obstructive sleep apnea)   (+) VIRGEN on CPAP        Physical Exam    Airway    Mallampati score: II  TM Distance: >3 FB  Neck ROM: full     Dental   Comment: Multiple missing,     Cardiovascular  Cardiovascular exam normal    Pulmonary  Pulmonary exam normal     Other Findings        Anesthesia Plan  ASA Score- 3     Anesthesia Type- IV sedation with anesthesia with ASA Monitors. Additional Monitors:   Airway Plan:           Plan Factors-Exercise tolerance (METS): >4 METS. Chart reviewed. Patient is not a current smoker. Patient instructed to abstain from smoking on day of procedure. Patient did not smoke on day of surgery. Obstructive sleep apnea risk education given perioperatively. Induction- intravenous. Postoperative Plan-     Informed Consent- Anesthetic plan and risks discussed with patient.

## 2023-10-04 ENCOUNTER — HOSPITAL ENCOUNTER (OUTPATIENT)
Dept: RADIOLOGY | Age: 68
Discharge: HOME | End: 2023-10-04
Payer: COMMERCIAL

## 2023-10-04 DIAGNOSIS — R41.3 MEMORY CHANGE: ICD-10-CM

## 2023-10-04 PROCEDURE — G1004 CDSM NDSC: HCPCS

## 2023-10-04 PROCEDURE — 76376 3D RENDER W/INTRP POSTPROCES: CPT

## 2023-10-04 PROCEDURE — 88305 TISSUE EXAM BY PATHOLOGIST: CPT | Performed by: PATHOLOGY

## 2023-10-04 PROCEDURE — 70551 MRI BRAIN STEM W/O DYE: CPT

## 2023-10-04 NOTE — RESULT ENCOUNTER NOTE
Please schedule the patient for repeat endoscopy in 1 year. Thank you    Inform patient via [facility]. Please review the pathology/lab result of further discussion. Copied from [employer] message :       Birdlaura Reilly,     Your stomach biopsies were unremarkable. There was no evidence of Alvares's esophagus which is good news. Continue with the acid reflux medications for the time being. We will repeat the endoscopy in 1 year. The colon polyps were benign but precancerous. Repeat colonoscopy in 3 years.     Best regards,     Nickolas Cueva MD

## 2023-11-30 DIAGNOSIS — I10 PRIMARY HYPERTENSION: ICD-10-CM

## 2023-11-30 RX ORDER — AMLODIPINE BESYLATE 10 MG/1
TABLET ORAL
Qty: 90 TABLET | Refills: 1 | Status: SHIPPED | OUTPATIENT
Start: 2023-11-30

## 2023-12-08 ENCOUNTER — TELEPHONE (OUTPATIENT)
Age: 68
End: 2023-12-08

## 2023-12-08 NOTE — TELEPHONE ENCOUNTER
Rep from PT's imsurance calling to inquire if PT made a TCM appointment. PT was hosptilized 11/28-11/30 @ [facility]. No appointment noted. Please f/u with PT to schedule TCM.     Thank You

## 2023-12-11 ENCOUNTER — TRANSITIONAL CARE MANAGEMENT (OUTPATIENT)
Dept: INTERNAL MEDICINE CLINIC | Facility: CLINIC | Age: 68
End: 2023-12-11

## 2023-12-11 PROCEDURE — TCMPR

## 2024-01-02 ENCOUNTER — OFFICE VISIT (OUTPATIENT)
Dept: INTERNAL MEDICINE CLINIC | Facility: CLINIC | Age: 69
End: 2024-01-02
Payer: COMMERCIAL

## 2024-01-02 ENCOUNTER — TELEPHONE (OUTPATIENT)
Age: 69
End: 2024-01-02

## 2024-01-02 VITALS
WEIGHT: 241 LBS | BODY MASS INDEX: 40.1 KG/M2 | HEART RATE: 59 BPM | SYSTOLIC BLOOD PRESSURE: 141 MMHG | OXYGEN SATURATION: 97 % | DIASTOLIC BLOOD PRESSURE: 69 MMHG | TEMPERATURE: 97.4 F

## 2024-01-02 DIAGNOSIS — J32.9 RECURRENT SINUS INFECTIONS: Primary | ICD-10-CM

## 2024-01-02 PROCEDURE — 99213 OFFICE O/P EST LOW 20 MIN: CPT

## 2024-01-02 RX ORDER — ALIROCUMAB 75 MG/ML
75 INJECTION, SOLUTION SUBCUTANEOUS
COMMUNITY
Start: 2023-12-05

## 2024-01-02 RX ORDER — AMOXICILLIN AND CLAVULANATE POTASSIUM 875; 125 MG/1; MG/1
1 TABLET, FILM COATED ORAL EVERY 12 HOURS SCHEDULED
Qty: 10 TABLET | Refills: 0 | Status: SHIPPED | OUTPATIENT
Start: 2024-01-02 | End: 2024-01-07

## 2024-01-02 RX ORDER — CLOPIDOGREL BISULFATE 75 MG/1
75 TABLET ORAL DAILY
COMMUNITY
Start: 2023-11-30

## 2024-01-02 NOTE — ASSESSMENT & PLAN NOTE
Thursday last week, patient noticed pain in his eye that radiated to his temple and throat .   Patient felt as though his sinus were clogged.   He had no problem breathing but  experienced  postnasal drip with greenish phlegm.   His mucus has gotten darker green and thicker.   Phlegm  are worse in the morning.   He uses Flonase but did not see any improvement   Sinuitis vs URI     Plan:   Augmentin for 5 days  Sinus rinse follow by Flonase

## 2024-01-02 NOTE — TELEPHONE ENCOUNTER
Patient called requesting an appointment. His symptoms started 12-. He has sinus pressure, coughing, nasal congestion, and no fever.  I spoke with Mary in the office clinical department. I confirmed that he was added to the schedule today 1-2-2024 at 3:00 pm with Dr. Melissa Holman.    19

## 2024-01-02 NOTE — PROGRESS NOTES
Name: Josep Blackmon      : 1955      MRN: [id number]  Encounter Provider: Melissa Holman MD  Encounter Date: 2024   Encounter department: [facility]    Assessment & Plan     1. Recurrent sinus infections  Assessment & Plan:  Thursday last week, patient noticed pain in his eye that radiated to his temple and throat .   Patient felt as though his sinus were clogged.   He had no problem breathing but  experienced  postnasal drip with greenish phlegm.   His mucus has gotten darker green and thicker.   Phlegm  are worse in the morning.   He uses Flonase but did not see any improvement   Sinuitis vs URI     Plan:   Augmentin for 5 days  Sinus rinse follow by Flonase     Orders:  -     amoxicillin-clavulanate (AUGMENTIN) 875-125 mg per tablet; Take 1 tablet by mouth every 12 (twelve) hours for 5 days           Subjective      HPI  Review of Systems   Constitutional:  Positive for chills. Negative for fever.   HENT:  Negative for ear pain and sore throat.    Eyes:  Negative for pain and visual disturbance.   Respiratory:  Negative for cough and shortness of breath.    Cardiovascular:  Negative for chest pain and palpitations.   Gastrointestinal:  Negative for abdominal pain and vomiting.   Genitourinary:  Negative for dysuria and hematuria.   Musculoskeletal:  Negative for arthralgias and back pain.   Skin:  Negative for color change and rash.   Neurological:  Negative for seizures and syncope.   All other systems reviewed and are negative.      Current Outpatient Medications on File Prior to Visit   Medication Sig    amLODIPine (NORVASC) 10 mg tablet TAKE 1 TABLET BY MOUTH EVERY DAY    aspirin 81 MG tablet Take 81 mg by mouth    calcium carbonate (TUMS) 500 mg chewable tablet Chew 2 tablets every 4 (four) hours as needed     clopidogrel (PLAVIX) 75 mg tablet Take 75 mg by mouth daily    Coenzyme Q10 (COQ-10) 100 MG CAPS Take 100 mg by mouth daily     ezetimibe (ZETIA) 10 mg tablet  TAKE 1 TABLET BY MOUTH EVERY DAY    fluticasone (FLONASE) 50 mcg/act nasal spray SPRAY 2 SPRAYS INTO EACH NOSTRIL EVERY DAY    furosemide (LASIX) 20 mg tablet TAKE 1 TABLET (20 MG TOTAL) BY MOUTH DAILY.    Klor-Con M10 10 MEQ tablet TAKE 2 TABLETS BY MOUTH DAILY    LORazepam (Ativan) 0.5 mg tablet Take 1 tablet (0.5 mg total) by mouth daily as needed for anxiety (Please take 1 tablet 1 hour prior to the MRI please get a ride to and from the MRI)    multivitamin (THERAGRAN) TABS Take 1 tablet by mouth Gummy form    Omega-3 Fatty Acids (FISH OIL PO) Take 1 capsule by mouth daily Gummy form    pantoprazole (PROTONIX) 20 mg tablet Take 1 tablet (20 mg total) by mouth daily    Praluent 75 MG/ML SOAJ Inject 75 mg under the skin every 14 (fourteen) days    sotalol (BETAPACE) 80 mg tablet Take 80 mg by mouth 2 (two) times a day Take half tablet 2 times a day    valsartan (DIOVAN) 320 MG tablet TAKE 1 TABLET BY MOUTH EVERY DAY    XARELTO 20 MG tablet TAKE 1 TABLET BY MOUTH ONCE NDAILY WITH DINNER    bisacodyl (DULCOLAX) 5 mg EC tablet Take 2 tablets (10 mg total) by mouth once for 1 dose    DENTA 5000 PLUS 1.1 % CREA  (Patient not taking: Reported on 10/17/2022)    ofloxacin (OCUFLOX) 0.3 % ophthalmic solution Administer 1 drop into the left eye 4 (four) times a day (Patient not taking: Reported on 10/17/2022)    polyethylene glycol (GOLYTELY) 4000 mL solution Take 4,000 mL by mouth once for 1 dose       Objective     /69   Pulse 59   Temp (!) 97.4 °F (36.3 °C) (Tympanic)   Wt 109 kg (241 lb)   SpO2 97%   BMI 40.10 kg/m²     Physical Exam  Constitutional:       Appearance: Normal appearance.   HENT:      Head: Normocephalic and atraumatic.      Mouth/Throat:      Mouth: Mucous membranes are moist.   Cardiovascular:      Rate and Rhythm: Normal rate and regular rhythm.      Pulses: Normal pulses.      Heart sounds: Normal heart sounds.   Pulmonary:      Effort: Pulmonary effort is normal.      Breath sounds: Normal  breath sounds.   Abdominal:      General: Abdomen is flat. Bowel sounds are normal.      Palpations: Abdomen is soft.   Skin:     General: Skin is warm.   Neurological:      Mental Status: He is alert and oriented to person, place, and time.   Psychiatric:         Mood and Affect: Mood normal.         Behavior: Behavior normal.       Melissa Holman MD

## 2024-01-10 ENCOUNTER — OFFICE VISIT (OUTPATIENT)
Dept: INTERNAL MEDICINE CLINIC | Facility: CLINIC | Age: 69
End: 2024-01-10
Payer: COMMERCIAL

## 2024-01-10 VITALS
HEIGHT: 65 IN | OXYGEN SATURATION: 99 % | WEIGHT: 242.6 LBS | BODY MASS INDEX: 40.42 KG/M2 | HEART RATE: 70 BPM | DIASTOLIC BLOOD PRESSURE: 78 MMHG | SYSTOLIC BLOOD PRESSURE: 122 MMHG

## 2024-01-10 DIAGNOSIS — E53.8 LOW SERUM VITAMIN B12: ICD-10-CM

## 2024-01-10 DIAGNOSIS — I48.4 ATYPICAL ATRIAL FLUTTER (HCC): ICD-10-CM

## 2024-01-10 DIAGNOSIS — I25.10 ATHEROSCLEROSIS OF NATIVE CORONARY ARTERY OF NATIVE HEART WITHOUT ANGINA PECTORIS: ICD-10-CM

## 2024-01-10 DIAGNOSIS — Z23 ENCOUNTER FOR IMMUNIZATION: Primary | ICD-10-CM

## 2024-01-10 DIAGNOSIS — I10 PRIMARY HYPERTENSION: ICD-10-CM

## 2024-01-10 DIAGNOSIS — L73.9 FOLLICULITIS: ICD-10-CM

## 2024-01-10 DIAGNOSIS — R73.03 PREDIABETES: ICD-10-CM

## 2024-01-10 DIAGNOSIS — E78.49 OTHER HYPERLIPIDEMIA: ICD-10-CM

## 2024-01-10 DIAGNOSIS — E66.01 MORBID OBESITY WITH BMI OF 45.0-49.9, ADULT (HCC): ICD-10-CM

## 2024-01-10 DIAGNOSIS — I71.20 THORACIC AORTIC ANEURYSM WITHOUT RUPTURE, UNSPECIFIED PART (HCC): ICD-10-CM

## 2024-01-10 PROCEDURE — 99214 OFFICE O/P EST MOD 30 MIN: CPT | Performed by: INTERNAL MEDICINE

## 2024-01-10 NOTE — PROGRESS NOTES
Assessment/Plan:    Thoracic aortic aneurysm (HCC)  Clinically the patient is stable currently being monitored by cardiology blood pressure controlled at 122/78 currently on Betapace, Diovan and amlodipine    Prediabetes  Pre diabetes -I have counseled the patient to follow a healthy balanced diet, I have counseled patient reduce carbohydrates and sweets in the diet, I would like the patient exercise routinely.  I will be checking hemoglobin A1c and comprehensive metabolic panel.  Have counseled patient about the prevention of diabetes, and the risk of progression to type 2 diabetes.    Morbid obesity with BMI of 45.0-49.9, adult (HCC)  Obesity -I have counseled patient following healthy and balanced diet, I would like the patient to lose weight, I would like the patient exercise routinely; we will continue monitor the patient's progress.    Hyperlipidemia  Hyperlipidemia controlled continue with current medical regiment recommend a low-cholesterol diet and recommend routine exercise we will continue to monitor the progress.  The patient's insurance does not cover Repatha he is currently on Praluent 75 mg/mL subcu every 14 days tolerating well thus far    HTN (hypertension)  Hypertension - controlled, I have counseled patient following healthy balance diet, I would like the patient reduce sodium, exercise routinely, I would like the patient continued the med current medical regiment and we will continue to monitor.    Atherosclerosis of native coronary artery of native heart without angina pectoris  Clinically patient is not having angina he is working with cardiologist Dr. Arnold at [facility] a recent cardiac catheterization did show nonocclusive disease of the LAD and circumflex.  The lesions in the LAD and circumflex were physiologically nonobstructing.  Medical management recommended patient not able to tolerate statin therefore on Praluent.  Also on beta-blocker.  At this point in time because of the  lesions in the LAD the patient will be discussing with his cardiologist Dr. Arnold about possibly getting a second opinion about stenting he will be discussing this in the near future with his cardiologist I did explain to him if he would like an opinion from [facility] to contact me and I will get him in touch with a interventional list patient will let me know in the future if interested    Folliculitis  Mild folliculitis belt line abdomen improving Rx for Bactroban cream 3 times daily x 7 days clean the area with antibacterial soap call if any change worse or symptoms not carrie         Problem List Items Addressed This Visit        Cardiovascular and Mediastinum    HTN (hypertension)     Hypertension - controlled, I have counseled patient following healthy balance diet, I would like the patient reduce sodium, exercise routinely, I would like the patient continued the med current medical regiment and we will continue to monitor.         Atherosclerosis of native coronary artery of native heart without angina pectoris     Clinically patient is not having angina he is working with cardiologist Dr. Arnold at [facility] a recent cardiac catheterization did show nonocclusive disease of the LAD and circumflex.  The lesions in the LAD and circumflex were physiologically nonobstructing.  Medical management recommended patient not able to tolerate statin therefore on Praluent.  Also on beta-blocker.  At this point in time because of the lesions in the LAD the patient will be discussing with his cardiologist Dr. Arnold about possibly getting a second opinion about stenting he will be discussing this in the near future with his cardiologist I did explain to him if he would like an opinion from  [facility] to contact me and I will get him in touch with a interventional list patient will let me know in the future if interested         Thoracic aortic aneurysm (HCC)     Clinically the patient is stable currently being monitored  by cardiology blood pressure controlled at 122/78 currently on Betapace, Diovan and amlodipine         Atypical atrial flutter (HCC)       Musculoskeletal and Integument    Folliculitis     Mild folliculitis belt line abdomen improving Rx for Bactroban cream 3 times daily x 7 days clean the area with antibacterial soap call if any change worse or symptoms not carrie         Relevant Medications    mupirocin (BACTROBAN) 2 % ointment       Other    Prediabetes     Pre diabetes -I have counseled the patient to follow a healthy balanced diet, I have counseled patient reduce carbohydrates and sweets in the diet, I would like the patient exercise routinely.  I will be checking hemoglobin A1c and comprehensive metabolic panel.  Have counseled patient about the prevention of diabetes, and the risk of progression to type 2 diabetes.         Relevant Orders    Comprehensive metabolic panel    Hemoglobin A1C    Hyperlipidemia     Hyperlipidemia controlled continue with current medical regiment recommend a low-cholesterol diet and recommend routine exercise we will continue to monitor the progress.  The patient's insurance does not cover Repatha he is currently on Praluent 75 mg/mL subcu every 14 days tolerating well thus far         Relevant Orders    Lipid Panel with Direct LDL reflex    Morbid obesity with BMI of 45.0-49.9, adult (HCC)     Obesity -I have counseled patient following healthy and balanced diet, I would like the patient to lose weight, I would like the patient exercise routinely; we will continue monitor the patient's progress.        Other Visit Diagnoses     Encounter for immunization    -  Primary    Low serum vitamin B12        Relevant Orders    Homocysteine    Methylmalonic acid, serum          RTO in 6 months call if any problems subjective:      Patient ID: Josep Blackmon is a 68 y.o. male.    HPI 68-year old male coming in for a follow up visit regarding thoracic aortic aneurysm without rupture,  morbid obesity, low serum B12, prediabetes, hyperlipidemia, folliculitis abdominal region, hypertension and atherosclerosis of the coronary arteries; the patient reports me compliant taking medications without untoward side effects the.  The patient is here to review his medical condition, update me on the medical condition and the patient reports me no hospitalizations and no ER visits.  Today he is here to review his laboratories no injuries no illnesses no falls he is trying to follow a healthy and balanced diet.  He is working with cardiologist Dr. Arnold at [facility] patient does have known coronary artery disease nonocclusive he reports me that they are being medically management he will be having a conversation with his cardiologist about possible PCI catheterization.  Patient does not describe chest pain no exertional chest pain he does not tolerate statin but has been able to start Praluent for which she is tolerating thus far.  He reports to me a pustular rash at the belt line which comes and goes  The pt has lad lesion, will discuss with Dr Giang,   The following portions of the patient's history were reviewed and updated as appropriate: allergies, current medications, past family history, past medical history, past social history, past surgical history and problem list.    Review of Systems   Constitutional:  Negative for activity change, appetite change and unexpected weight change.   HENT:  Negative for congestion and postnasal drip.    Eyes:  Negative for visual disturbance.   Respiratory:  Negative for cough and shortness of breath.    Cardiovascular:  Negative for chest pain.   Gastrointestinal:  Negative for abdominal pain, diarrhea, nausea and vomiting.   Neurological:  Negative for dizziness, light-headedness and headaches.   Hematological:  Negative for adenopathy.    Pustular rash [facility]    Objective:    Return in about 6 months (around 7/10/2024).    Procedure: NM NUCLEAR STRESS -  TREADMILL    Result Date: 1/8/2024  Narrative: This result has an attachment that is not available. •  Stress Findings: A Maximino protocol stress test was performed. The patient had a maximal HR of 122 bpm (81 % of MPHR) 7.1 METS. The patient experienced no angina during the test. The test was stopped because the patient experienced dyspnea 8/10.  RPE was 9/10 at test term. The patient reported dyspnea 8/10 during the stress test. Symptoms ended during recovery. •  Stress ECG: No ST deviation was noted. The ECG was not diagnostic due to failure to achieve 85% MAPHR. •  Resting ECG shows no ST-segment deviation. •  Resting ECG: The ECG shows normal sinus rhythm. •  Blood pressure demonstrated a normal response and heart rate demonstrated a blunted response to stress. •  Stress Function Comments: Stress ejection fraction is 73 %. Wall motion is normal. •  Stress Perfusion: Stress myocardial perfusion is normal.  Heart rate reached was 81% of max pressure and heart rate but rate of perceived exertion 9 out of 10 and dyspnea 8 out of 10 during stress test suggesting good workload. Resting ECG Resting ECG shows no ST-segment deviation. The ECG shows normal sinus rhythm. The ECG axis is normal. Stress Findings A Maximino protocol stress test was performed. The patient had a maximal HR of 122 bpm (81 % of MPHR) 7.1 METS. The patient experienced no angina during the test. The test was stopped because the patient experienced dyspnea 8/10. RPE was 9/10 at test term. The patient reported dyspnea 8/10 during the stress test. Symptoms ended during recovery. Blood pressure demonstrated a normal response and heart rate demonstrated a blunted response to stress. Stress ECG No ST deviation was noted. There were no arrhythmias during stress. There were no arrhythmias during recovery. The ECG was not diagnostic due to failure to achieve 85% MAPHR. Isotope Administration The isotope used for nuclear imaging was technetium sestamibi. REST:  Rest Imaging was performed after an injection on 1/8/2024 at 07:10 EST of 10.6 mCi. The isotope was administered intravenously via the IV-Left Antecubital. The Resting dose was administered by: SHA Villalobos. Time between isotope injection and imaging was 45 minutes. STRESS: Stress Imaging was performed after an injection on 1/8/2024 at 08:27 EST of 28.5 mCi. The isotope was administered intravenously via the IV-Left Antecubital. The Stress dose was administered by: SHA Villalobos. Time between stress dose isotope injection and imaging was 45 minutes. Nuclear Study Quality A perfusion 1-day rest/stress protocol was performed. Overall image quality is good. There are no artifacts present. Radiopharmaceutical dose was extravasated: no. Perfusion Defect Conclusion Stress LV cavity size is 85 mL. Resting LV cavity size is 93 mL. The stress/rest perfusion ratio is 0.91 . Stress Function Comments Stress ejection fraction is 73 %. End stress diastolic index: 68 mL/m2. End stress systolic index: 18 mL/m2. End diastolic volume is: 147 mL. End systolic volume is: 39 mL. Wall motion is normal. Prior Study There is a prior study available for comparison that was performed on 4/22/2021. Stress Perfusion Defect Stress myocardial perfusion is normal.       Allergies   Allergen Reactions   • Iodine - Food Allergy Shortness Of Breath     Other reaction(s): Respiratory Distress  Other reaction(s): Respiratory Distress   • Omnipaque [Iohexol] Anaphylaxis   • Erythromycin Hives     Other reaction(s): Other (See Comments)  Pleurisy   Pleurisy   Other reaction(s): Other (See Comments)  Pleurisy    • Iodixanol    • Lisinopril Cough   • Statins Myalgia       Past Medical History:   Diagnosis Date   • Allergic rhinitis due to pollen     last assessed 10/01/15   • Aortic aneurysm (HCC)     4.3 cm  6/2018 last check   • Alvares esophagus    • Coronary artery disease    • CPAP (continuous positive airway pressure) dependence     • Diabetes (HCC)    • GERD (gastroesophageal reflux disease)    • History of cardiac cath    • Hyperlipidemia    • Hypertension    • Irregular heart beat     a flutter   • Mild intermittent asthma without complication     last assessed 10/01/15   • Positive PPD     last assesssed 12/21/15   • Pulmonary embolism (HCC)    • Sleep apnea    • Stroke (HCC)     unsure if/when happened; no weakness   • Syncopal episodes     1 year ago   • Vertigo     last assessed 04/24/17     Past Surgical History:   Procedure Laterality Date   • CARDIAC CATHETERIZATION      with stent   • COLONOSCOPY N/A 11/14/2018    Procedure: COLONOSCOPY;  Surgeon: GUERLINE Aparicio MD;  Location: [facility];  Service: Colorectal   • EGD AND COLONOSCOPY     • ENDOSCOPIC ULTRASOUND (LOWER)  01/23/2020   • HERNIA REPAIR     • JOINT REPLACEMENT Left     knee   • MULTIPLE TOOTH EXTRACTIONS  05/2020   • TOTAL KNEE ARTHROPLASTY  08/29/2016    Dr Santoyo 07/28/16   • UPPER GASTROINTESTINAL ENDOSCOPY  10/21/2019   • URETHRA SURGERY      polyps removed     Current Outpatient Medications on File Prior to Visit   Medication Sig Dispense Refill   • amLODIPine (NORVASC) 10 mg tablet TAKE 1 TABLET BY MOUTH EVERY DAY 90 tablet 1   • aspirin 81 MG tablet Take 81 mg by mouth     • calcium carbonate (TUMS) 500 mg chewable tablet Chew 2 tablets every 4 (four) hours as needed      • clopidogrel (PLAVIX) 75 mg tablet Take 75 mg by mouth daily     • ezetimibe (ZETIA) 10 mg tablet TAKE 1 TABLET BY MOUTH EVERY DAY 90 tablet 3   • fluticasone (FLONASE) 50 mcg/act nasal spray SPRAY 2 SPRAYS INTO EACH NOSTRIL EVERY DAY 48 mL 1   • furosemide (LASIX) 20 mg tablet TAKE 1 TABLET (20 MG TOTAL) BY MOUTH DAILY. 90 tablet 3   • Klor-Con M10 10 MEQ tablet TAKE 2 TABLETS BY MOUTH DAILY 180 tablet 3   • LORazepam (Ativan) 0.5 mg tablet Take 1 tablet (0.5 mg total) by mouth daily as needed for anxiety (Please take 1 tablet 1 hour prior to the MRI please get a ride to and from the MRI)  1 tablet 0   • multivitamin (THERAGRAN) TABS Take 1 tablet by mouth Gummy form     • Omega-3 Fatty Acids (FISH OIL PO) Take 1 capsule by mouth daily Gummy form     • pantoprazole (PROTONIX) 20 mg tablet Take 1 tablet (20 mg total) by mouth daily 90 tablet 3   • Praluent 75 MG/ML SOAJ Inject 75 mg under the skin every 14 (fourteen) days     • sotalol (BETAPACE) 80 mg tablet Take 80 mg by mouth 2 (two) times a day Take half tablet 2 times a day     • valsartan (DIOVAN) 320 MG tablet TAKE 1 TABLET BY MOUTH EVERY DAY 90 tablet 1   • XARELTO 20 MG tablet TAKE 1 TABLET BY MOUTH ONCE NDAILY WITH DINNER 30 tablet 0   • bisacodyl (DULCOLAX) 5 mg EC tablet Take 2 tablets (10 mg total) by mouth once for 1 dose 2 tablet 0   • Coenzyme Q10 (COQ-10) 100 MG CAPS Take 100 mg by mouth daily      • DENTA 5000 PLUS 1.1 % CREA  (Patient not taking: Reported on 10/17/2022)     • ofloxacin (OCUFLOX) 0.3 % ophthalmic solution Administer 1 drop into the left eye 4 (four) times a day (Patient not taking: Reported on 10/17/2022) 5 mL 0   • polyethylene glycol (GOLYTELY) 4000 mL solution Take 4,000 mL by mouth once for 1 dose 4000 mL 0     No current facility-administered medications on file prior to visit.     Family History   Problem Relation Age of Onset   • Heart disease Mother    • Hypertension Mother    • Coronary artery disease Father    • Hypertension Father    • Heart disease Father         cardiac disorder   • Stomach cancer Maternal Grandmother    • Thyroid disease Neg Hx      Social History     Socioeconomic History   • Marital status: /Civil Union     Spouse name: Not on file   • Number of children: 2   • Years of education: Not on file   • Highest education level: Not on file   Occupational History   • Not on file   Tobacco Use   • Smoking status: Never   • Smokeless tobacco: Never   Vaping Use   • Vaping status: Never Used   Substance and Sexual Activity   • Alcohol use: Never   • Drug use: No   • Sexual activity: Yes  "  Other Topics Concern   • Not on file   Social History Narrative    Retired scuba paramedic for [employer]     Social Determinants of Health     Financial Resource Strain: Low Risk  (11/28/2023)    Received from [facility]    Overall Financial Resource Strain (CARDIA)    • Difficulty of Paying Living Expenses: Not very hard   Food Insecurity: No Food Insecurity (11/28/2023)    Received from [facility]    Hunger Vital Sign    • Worried About Running Out of Food in the Last Year: Never true    • Ran Out of Food in the Last Year: Never true   Transportation Needs: No Transportation Needs (11/28/2023)    Received from [facility]    PRAPARE - Transportation    • Lack of Transportation (Medical): No    • Lack of Transportation (Non-Medical): No   Physical Activity: Not on file   Stress: Not on file   Social Connections: Not on file   Intimate Partner Violence: Not At Risk (11/28/2023)    Received from [facility]    Humiliation, Afraid, Rape, and Kick questionnaire    • Fear of Current or Ex-Partner: No    • Emotionally Abused: No    • Physically Abused: No    • Sexually Abused: No   Housing Stability: Low Risk  (11/28/2023)    Received from [facility]    Housing Stability Vital Sign    • Unable to Pay for Housing in the Last Year: No    • Number of Places Lived in the Last Year: 1    • Unstable Housing in the Last Year: No     Vitals:    01/10/24 1015   BP: 122/78   BP Location: Right arm   Patient Position: Sitting   Cuff Size: Large   Pulse: 70   SpO2: 99%   Weight: 110 kg (242 lb 9.6 oz)   Height: 5' 5\" (1.651 m)     Results for orders placed or performed during the hospital encounter of 10/02/23   Tissue Exam   Result Value Ref Range    Case Report       Surgical Pathology Report                         Case: [id number]                                   Authorizing Provider:  Keaml Moseley MD             Collected:           10/02/2023 " 0739              Ordering Location:     [facility]        Received:            10/02/2023 [address] [place] [address] Endoscopy                                                     Pathologist:           Roman Sigala MD                                                                 Specimens:   A) - Polyp, Stomach/Small Intestine, gastric body polyp cold snare                                  B) - Stomach, gastric bx's for gastritis                                                            C) - Esophagogastric junction, bx's hx barretts                                                     D) - Esophagus, distal esophagus bx's hx barretts                                                    E) - Large Intestine, Cecum, polyp cold snare                                                       F) - Large Intestine, Right/Ascending Colon, polyp cold snare                                       G) - Large Intestine, Left/Descending Colon, polyp cold snare                                       H) - Rectum, polyp cold snare                                                              Final Diagnosis       A. Gastric body polyp:  - Fundic gland polyp.  - Negative for dysplasia and malignancy.     B. Stomach, bx's for gastritis:  - Mild chronic inactive gastritis.  - Negative for H. pylori by routine H&E.  - Negative for malignancy.     C. Esophagogastric junction, bx's hx barretts:  - Squamocolumnar mucosa with mild chronic inflammation.  - Negative for intestinal metaplasia, dysplasia, and malignancy.     D. Esophagus, distal esophagus bx's hx barretts:  - Squamous mucosa with mild chronic inflammation.  - No intraepithelial eosinophils identified.  - Separate fragment of unremarkable glandular mucosa.  - Negative for intestinal metaplasia, dysplasia, and malignancy.     E. Large Intestine,  "Cecum, polyp:  - Tubular adenoma.  - Negative for high-grade dysplasia and malignancy.     F. Large Intestine, Right/Ascending Colon, polyp:  - Serrated colon polyp, favor sessile serrated lesion.  - Negative for malignancy.     G. Large Intestine, Left/Descending Colon, polyp:  - Tubular adenoma.  - Negative for high-grade dysplasia and malignancy.     H. Rectum, polyp:  - Hyperplastic polyp.  - Negative for dysplasia and malignancy.      Additional Information       All reported additional testing was performed with appropriately reactive controls.  These tests were developed and their performance characteristics determined by [facility] or appropriate performing facility, though some tests may be performed on tissues which have not been validated for performance characteristics (such as staining performed on alcohol exposed cell blocks and decalcified tissues).  Results should be interpreted with caution and in the context of the patients’ clinical condition. These tests may not be cleared or approved by the U.S. Food and Drug Administration, though the FDA has determined that such clearance or approval is not necessary. These tests are used for clinical purposes and they should not be regarded as investigational or for research. This laboratory has been approved by [place] 88, designated as a high-complexity laboratory and is qualified to perform these tests.  .      Synoptic Checklist          COLON/RECTUM POLYP FORM - GI - All Specimens          :    Adenoma(s)       :    Serrated Adenoma      Gross Description        A. The specimen is received in formalin, labeled with the patient's name and hospital number, and is designated \" gastric body polyp\".  The specimen consists of a single pink polypoid tissue fragment measuring 0.3 cm in greatest dimension.  Entirely submitted.  One screened cassette.    B. The specimen is received in formalin, labeled with the patient's name and hospital number, " "and is designated \" stomach gastric biopsy for gastritis\".  The specimen consists of 4 tan-pink tissue fragments measuring 0.1-0.4 cm in greatest dimension.  Entirely submitted.  One screened cassette.    C. The specimen is received in formalin, labeled with the patient's name and hospital number, and is designated \" esophogastric junction biopsy, history of Alvares's\".  The specimen consists of 2 tan tissue fragments measuring 0.2 and 0.3 cm in greatest dimension.  Entirely submitted.  One screened cassette.    D. The specimen is received in formalin, labeled with the patient's name and hospital number, and is designated \" distal esophagus biopsy, history of Alvares's\".  The specimen consists of multiple colorless-tan irregularly shaped tissue fragments measuring in aggregate of 0.5 x 0.3 x 0.1 cm.  Entirely submitted.  One screened cassette.    E. The specimen is received in formalin, labeled with the patient's name and hospital number, and is designated \" cecum polyp\".  The specimen consists of a single tan flat tissue fragment measuring 0.9 x 0.6 x 0.1 cm.  Entirely submitted.  One screened cassette.    F. The specimen is received in formalin, labeled with the patient's name and hospital number, and is designated \" right ascending colon polyp\".  The specimen consists of multiple tan flat and elongated tissue fragments measuring in aggregate of 1.3 x 0.5 x 0.1 cm.  Entirely submitted.  One screened cassette.    G. The specimen is received in formalin, labeled with the patient's name and hospital number, and is designated \" left descending colon polyp\".  The specimen consists of multiple tan-pink irregularly shaped soft tissue fragments versus food particles measuring in aggregate of 1.5 x 0.7 x 0.2 cm.  Entirely submitted.  One screened cassette.    H. The specimen is received in formalin, labeled with the patient's name and hospital number, and is designated \" rectum polyp\".  The specimen consists of a single " "pink polypoid tissue fragment measuring 0.5 x 0.4 x 0.3 cm.  The specimen is entirely submitted in a screened cassette.    Note: The estimated total formalin fixation time based upon information provided by the submitting clinician and the standard processing schedule is under 72 hours. Mercedes Holbrook       Weight (last 2 days)     Date/Time Weight    01/10/24 1015 110 (242.6)        Body mass index is 40.37 kg/m².  BP      Temp      Pulse     Resp      SpO2        Vitals:    01/10/24 1015   Weight: 110 kg (242 lb 9.6 oz)     Vitals:    01/10/24 1015   Weight: 110 kg (242 lb 9.6 oz)       /78 (BP Location: Right arm, Patient Position: Sitting, Cuff Size: Large)   Pulse 70   Ht 5' 5\" (1.651 m)   Wt 110 kg (242 lb 9.6 oz)   SpO2 99%   BMI 40.37 kg/m²       Several resolving pustules at the belt line   Physical Exam  Constitutional:       General: He is not in acute distress.     Appearance: He is well-developed. He is not diaphoretic.   HENT:      Head: Normocephalic and atraumatic.      Right Ear: External ear normal.      Left Ear: External ear normal.   Eyes:      General: No scleral icterus.        Right eye: No discharge.         Left eye: No discharge.      Conjunctiva/sclera: Conjunctivae normal.      Pupils: Pupils are equal, round, and reactive to light.   Cardiovascular:      Rate and Rhythm: Normal rate and regular rhythm.      Heart sounds: Normal heart sounds. No murmur heard.     No friction rub. No gallop.   Pulmonary:      Effort: No respiratory distress.      Breath sounds: No wheezing or rales.   Abdominal:      General: Bowel sounds are normal. There is no distension.      Palpations: Abdomen is soft. There is no mass.      Tenderness: There is no abdominal tenderness. There is no guarding or rebound.   Musculoskeletal:         General: No deformity.      Cervical back: Neck supple.   Lymphadenopathy:      Cervical: No cervical adenopathy.   Neurological:      Mental Status: He is " alert.

## 2024-01-11 PROBLEM — L73.9 FOLLICULITIS: Status: ACTIVE | Noted: 2024-01-11

## 2024-01-11 NOTE — ASSESSMENT & PLAN NOTE
Pre diabetes -I have counseled the patient to follow a healthy balanced diet, I have counseled patient reduce carbohydrates and sweets in the diet, I would like the patient exercise routinely.  I will be checking hemoglobin A1c and comprehensive metabolic panel.  Have counseled patient about the prevention of diabetes, and the risk of progression to type 2 diabetes.

## 2024-01-11 NOTE — ASSESSMENT & PLAN NOTE
Hypertension - controlled, I have counseled patient following healthy balance diet, I would like the patient reduce sodium, exercise routinely, I would like the patient continued the med current medical regiment and we will continue to monitor.

## 2024-01-11 NOTE — ASSESSMENT & PLAN NOTE
Clinically the patient is stable currently being monitored by cardiology blood pressure controlled at 122/78 currently on Betapace, Diovan and amlodipine

## 2024-01-11 NOTE — ASSESSMENT & PLAN NOTE
Hyperlipidemia controlled continue with current medical regiment recommend a low-cholesterol diet and recommend routine exercise we will continue to monitor the progress.  The patient's insurance does not cover Repatha he is currently on Praluent 75 mg/mL subcu every 14 days tolerating well thus far

## 2024-01-11 NOTE — ASSESSMENT & PLAN NOTE
Clinically patient is not having angina he is working with cardiologist Dr. Arnold at [facility] a recent cardiac catheterization did show nonocclusive disease of the LAD and circumflex.  The lesions in the LAD and circumflex were physiologically nonobstructing.  Medical management recommended patient not able to tolerate statin therefore on Praluent.  Also on beta-blocker.  At this point in time because of the lesions in the LAD the patient will be discussing with his cardiologist Dr. Arnold about possibly getting a second opinion about stenting he will be discussing this in the near future with his cardiologist I did explain to him if he would like an opinion from [facility] to contact me and I will get him in touch with a interventional list patient will let me know in the future if interested

## 2024-01-11 NOTE — ASSESSMENT & PLAN NOTE
Obesity -I have counseled patient following healthy and balanced diet, I would like the patient to lose weight, I would like the patient exercise routinely; we will continue monitor the patient's progress.

## 2024-01-11 NOTE — ASSESSMENT & PLAN NOTE
Mild folliculitis belt line abdomen improving Rx for Bactroban cream 3 times daily x 7 days clean the area with antibacterial soap call if any change worse or symptoms not carrie

## 2024-01-21 DIAGNOSIS — L73.9 FOLLICULITIS: ICD-10-CM

## 2024-01-21 DIAGNOSIS — I10 ESSENTIAL HYPERTENSION: ICD-10-CM

## 2024-01-22 RX ORDER — VALSARTAN 320 MG/1
TABLET ORAL
Qty: 90 TABLET | Refills: 1 | Status: SHIPPED | OUTPATIENT
Start: 2024-01-22

## 2024-02-21 PROBLEM — J01.00 ACUTE MAXILLARY SINUSITIS: Status: RESOLVED | Noted: 2017-06-20 | Resolved: 2024-02-21

## 2024-02-21 PROBLEM — Z12.5 SCREENING PSA (PROSTATE SPECIFIC ANTIGEN): Status: RESOLVED | Noted: 2019-03-25 | Resolved: 2024-02-21

## 2024-02-21 PROBLEM — J32.9 RECURRENT SINUS INFECTIONS: Status: RESOLVED | Noted: 2024-01-02 | Resolved: 2024-02-21

## 2024-02-21 PROBLEM — Z12.11 SPECIAL SCREENING FOR MALIGNANT NEOPLASMS, COLON: Status: RESOLVED | Noted: 2018-11-05 | Resolved: 2024-02-21

## 2024-02-21 PROBLEM — R05.9 COUGH: Status: RESOLVED | Noted: 2019-01-27 | Resolved: 2024-02-21

## 2024-03-05 ENCOUNTER — APPOINTMENT (OUTPATIENT)
Dept: LAB | Age: 69
End: 2024-03-05
Payer: COMMERCIAL

## 2024-03-05 DIAGNOSIS — I35.1 NONRHEUMATIC AORTIC (VALVE) INSUFFICIENCY: ICD-10-CM

## 2024-03-05 DIAGNOSIS — E53.8 LOW SERUM VITAMIN B12: ICD-10-CM

## 2024-03-05 DIAGNOSIS — I45.10 INCOMPLETE RIGHT BUNDLE BRANCH BLOCK: ICD-10-CM

## 2024-03-05 DIAGNOSIS — Z86.79 H/O PRIMARY HYPERTENSION: ICD-10-CM

## 2024-03-05 DIAGNOSIS — I48.0 PAROXYSMAL ATRIAL FIBRILLATION (HCC): ICD-10-CM

## 2024-03-05 DIAGNOSIS — Z79.01 CHRONIC ANTICOAGULATION: ICD-10-CM

## 2024-03-05 DIAGNOSIS — E78.49 OTHER HYPERLIPIDEMIA: ICD-10-CM

## 2024-03-05 DIAGNOSIS — E66.01 MORBID OBESITY (HCC): ICD-10-CM

## 2024-03-05 DIAGNOSIS — R73.03 PREDIABETES: ICD-10-CM

## 2024-03-05 DIAGNOSIS — E78.5 HYPERLIPIDEMIA, UNSPECIFIED HYPERLIPIDEMIA TYPE: ICD-10-CM

## 2024-03-05 DIAGNOSIS — I71.20 THORACIC AORTIC ANEURYSM (TAA), UNSPECIFIED PART, UNSPECIFIED WHETHER RUPTURED (HCC): ICD-10-CM

## 2024-03-05 DIAGNOSIS — I25.10 ATHEROSCLEROSIS OF NATIVE CORONARY ARTERY OF NATIVE HEART WITHOUT ANGINA PECTORIS: ICD-10-CM

## 2024-03-05 LAB
ALBUMIN SERPL BCP-MCNC: 4.1 G/DL (ref 3.5–5)
ALP SERPL-CCNC: 93 U/L (ref 34–104)
ALT SERPL W P-5'-P-CCNC: 20 U/L (ref 7–52)
ANION GAP SERPL CALCULATED.3IONS-SCNC: 5 MMOL/L
AST SERPL W P-5'-P-CCNC: 19 U/L (ref 13–39)
BILIRUB DIRECT SERPL-MCNC: 0.18 MG/DL (ref 0–0.2)
BILIRUB SERPL-MCNC: 0.73 MG/DL (ref 0.2–1)
BUN SERPL-MCNC: 10 MG/DL (ref 5–25)
CALCIUM SERPL-MCNC: 9.6 MG/DL (ref 8.4–10.2)
CHLORIDE SERPL-SCNC: 103 MMOL/L (ref 96–108)
CHOLEST SERPL-MCNC: 104 MG/DL
CO2 SERPL-SCNC: 33 MMOL/L (ref 21–32)
CREAT SERPL-MCNC: 0.71 MG/DL (ref 0.6–1.3)
EST. AVERAGE GLUCOSE BLD GHB EST-MCNC: 114 MG/DL
GFR SERPL CREATININE-BSD FRML MDRD: 95 ML/MIN/1.73SQ M
GLUCOSE P FAST SERPL-MCNC: 101 MG/DL (ref 65–99)
HBA1C MFR BLD: 5.6 %
HCYS SERPL-SCNC: 8 UMOL/L (ref 5–20)
HDLC SERPL-MCNC: 43 MG/DL
LDLC SERPL CALC-MCNC: 38 MG/DL (ref 0–100)
POTASSIUM SERPL-SCNC: 3.8 MMOL/L (ref 3.5–5.3)
PROT SERPL-MCNC: 7.6 G/DL (ref 6.4–8.4)
SODIUM SERPL-SCNC: 141 MMOL/L (ref 135–147)
TRIGL SERPL-MCNC: 117 MG/DL

## 2024-03-05 PROCEDURE — 36415 COLL VENOUS BLD VENIPUNCTURE: CPT

## 2024-03-05 PROCEDURE — 80061 LIPID PANEL: CPT

## 2024-03-05 PROCEDURE — 83918 ORGANIC ACIDS TOTAL QUANT: CPT

## 2024-03-05 PROCEDURE — 83036 HEMOGLOBIN GLYCOSYLATED A1C: CPT

## 2024-03-05 PROCEDURE — 82248 BILIRUBIN DIRECT: CPT

## 2024-03-05 PROCEDURE — 80053 COMPREHEN METABOLIC PANEL: CPT

## 2024-03-05 PROCEDURE — 83090 ASSAY OF HOMOCYSTEINE: CPT

## 2024-03-11 LAB — METHYLMALONATE SERPL-SCNC: 130 NMOL/L (ref 0–378)

## 2024-04-11 ENCOUNTER — TELEPHONE (OUTPATIENT)
Dept: NEUROLOGY | Facility: CLINIC | Age: 69
End: 2024-04-11

## 2024-04-18 ENCOUNTER — OFFICE VISIT (OUTPATIENT)
Dept: NEUROLOGY | Facility: CLINIC | Age: 69
End: 2024-04-18
Payer: COMMERCIAL

## 2024-04-18 VITALS
HEART RATE: 52 BPM | DIASTOLIC BLOOD PRESSURE: 62 MMHG | WEIGHT: 245 LBS | HEIGHT: 65 IN | BODY MASS INDEX: 40.82 KG/M2 | SYSTOLIC BLOOD PRESSURE: 121 MMHG | TEMPERATURE: 98.2 F

## 2024-04-18 DIAGNOSIS — Z86.73 HISTORY OF TRANSIENT ISCHEMIC ATTACK (TIA): Primary | ICD-10-CM

## 2024-04-18 PROCEDURE — 99213 OFFICE O/P EST LOW 20 MIN: CPT | Performed by: NURSE PRACTITIONER

## 2024-04-18 NOTE — ASSESSMENT & PLAN NOTE
TIA in 2017. Continues to be free of recurrent events. Recent A1C and lipid panel were normal. BP was 121/62 today which is acceptable. Overall he has been doing well.     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 From a neurologic standpoint, he has been without recurrent events, A1C/lipid panel has been stable, and overall he has been doing well.  His paper regarding neurologic clearance was completed but overall he will need approval from a CDL medical evaluator regarding approval after cardiology/PCP approval as well.    Signs and symptoms of stroke/TIA were reviewed and when to call 911. He will continue current medications unchanged (as prescribed by cardiology and PCP). He will call the office with issues or concerns. Follow up in 1 year or sooner if needed.

## 2024-04-18 NOTE — PATIENT INSTRUCTIONS
- Continue current medications  - Blood pressure was good today 121/62  - Recent lipid panel and A1C were normal  - Call the office with any issues or concerns  - Follow up in 1 year with Dr Daugherty   Call 911 with any signs or symptoms of stroke:   - Think BE FAST:    - Sudden onset difficulty with balance    - Sudden vision loss in one or both eyes or difficulty with vision    - One sided facial weakness (facial asymmetry)    - One of you arms or legs are weaker than the other    - Speech changes (slurred or garbled speech, difficulty getting words out)    - With any of the above symptoms, it is time to call 911

## 2024-04-18 NOTE — PROGRESS NOTES
Patient ID: Josep Blackmon is a 68 y.o. male with TIA in 3/2017, who is returning to Neurology office for follow up of his TIA.     Assessment/Plan:    History of transient ischemic attack (TIA)  TIA in 2017. Continues to be free of recurrent events. Recent A1C and lipid panel were normal. BP was 121/62 today which is acceptable. Overall he has been doing well.     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 From a neurologic standpoint, he has been without recurrent events, A1C/lipid panel has been stable, and overall he has been doing well.  His paper regarding neurologic clearance was completed but overall he will need approval from a CDL medical evaluator regarding approval after cardiology/PCP approval as well.    Signs and symptoms of stroke/TIA were reviewed and when to call 911. He will continue current medications unchanged (as prescribed by cardiology and PCP). He will call the office with issues or concerns. Follow up in 1 year or sooner if needed.      He will Return in about 1 year (around 4/18/2025) for Follow up with Dr Landry.      Subjective:  Josep Blackmon is a 68 y.o. male with TIA in 3/2017, who is returning to Neurology office for follow up of his TIA. Prior MRI also revealing findings concerning for right lacunar infarcts. Last seen in the office by Dr landry on 4/21/23. At that time, there were no recurrent events. He was on xarelto and aspirin in the setting of aflutter. E was also on zetia as he did not tolerate pravastatin. He was following closely with cardiology. Recommended follow up in 1 year.     He continues to do well. No issues. No recurrent s/s of stroke. Denies any concerns at today's visit. Cardiology managing anticoagulation for aflutter and stents. Also continues to follow with sleep medicine.     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 Has paperwork for multiple providers for his physical that is  "being completed by occupational medicine at [facility].      Latest Reference Range & Units 03/05/24 08:56   Cholesterol See Comment mg/dL 104   Triglycerides See Comment mg/dL 117   HDL >=40 mg/dL 43   LDL Calculated 0 - 100 mg/dL 38      Latest Reference Range & Units 03/05/24 08:56   Hemoglobin A1C Normal 4.0-5.6%; PreDiabetic 5.7-6.4%; Diabetic >=6.5%; Glycemic control for adults with diabetes <7.0% % 5.6        I reviewed prior neurology, as documented in Epic/[facility], and summarized above.        Objective:    Blood pressure 121/62, pulse (!) 52, temperature 98.2 °F (36.8 °C), temperature source Temporal, height 5' 5\" (1.651 m), weight 111 kg (245 lb).    Physical Exam  No apparent distress.   Appears well nourished.   Mood appropriate for situation     Neurologic Exam  Mental status- alert and oriented to person, place, and time. Speech appropriate for conversation. Good attention and knowledge.     Cranial Nerves- PERRL, VFFTC, EOMS normal, facial sensation and muscles symmetric, hearing intact bilaterally to finger rubs, tongue midline, palate rise symmetrical, shoulder shrug symmetrical.    Motor- No pronator drift. Appropriate strength. Moves all extremities freely. No tremor.    Sensory-  Intact distally in all extremities to light touch.     DTRs- not assessed.    Gait- normal casual gait.     Coordination- FNF intact.     ROS:  Review of Systems   Constitutional:  Negative for appetite change, fatigue and fever.   HENT: Negative.  Negative for hearing loss, tinnitus, trouble swallowing and voice change.    Eyes: Negative.  Negative for photophobia, pain and visual disturbance.   Respiratory: Negative.  Negative for shortness of breath.    Cardiovascular: Negative.  Negative for palpitations.   Gastrointestinal: Negative.  Negative for nausea and vomiting.   Endocrine: Negative.  Negative for cold intolerance.   Genitourinary: Negative.  Negative for dysuria, frequency and urgency. "   Musculoskeletal:  Negative for back pain, gait problem, myalgias, neck pain and neck stiffness.   Skin: Negative.  Negative for rash.   Allergic/Immunologic: Negative.    Neurological: Negative.  Negative for dizziness, tremors, seizures, syncope, facial asymmetry, speech difficulty, weakness, light-headedness, numbness and headaches.   Hematological: Negative.  Does not bruise/bleed easily.   Psychiatric/Behavioral: Negative.  Negative for confusion, hallucinations and sleep disturbance.    All other systems reviewed and are negative.        ROS obtained by MA and reviewed by myself.     This note may have been created using voice recognition software. There may be unintentional errors such as grammatical errors, spelling errors, or pronoun errors.

## 2024-04-22 DIAGNOSIS — L73.9 FOLLICULITIS: ICD-10-CM

## 2024-04-22 DIAGNOSIS — E87.6 HYPOKALEMIA: ICD-10-CM

## 2024-04-22 RX ORDER — POTASSIUM CHLORIDE 750 MG/1
TABLET, EXTENDED RELEASE ORAL
Qty: 180 TABLET | Refills: 1 | Status: SHIPPED | OUTPATIENT
Start: 2024-04-22

## 2024-05-06 ENCOUNTER — RA CDI HCC (OUTPATIENT)
Dept: OTHER | Facility: HOSPITAL | Age: 69
End: 2024-05-06

## 2024-05-06 ENCOUNTER — OFFICE VISIT (OUTPATIENT)
Dept: SLEEP CENTER | Facility: CLINIC | Age: 69
End: 2024-05-06
Payer: COMMERCIAL

## 2024-05-06 VITALS
BODY MASS INDEX: 40.48 KG/M2 | SYSTOLIC BLOOD PRESSURE: 130 MMHG | HEIGHT: 65 IN | DIASTOLIC BLOOD PRESSURE: 82 MMHG | WEIGHT: 243 LBS

## 2024-05-06 DIAGNOSIS — I25.10 ATHEROSCLEROSIS OF NATIVE CORONARY ARTERY OF NATIVE HEART WITHOUT ANGINA PECTORIS: ICD-10-CM

## 2024-05-06 DIAGNOSIS — G47.33 OSA (OBSTRUCTIVE SLEEP APNEA): Primary | ICD-10-CM

## 2024-05-06 DIAGNOSIS — E66.9 OBESITY (BMI 30-39.9): ICD-10-CM

## 2024-05-06 DIAGNOSIS — I48.4 ATYPICAL ATRIAL FLUTTER (HCC): ICD-10-CM

## 2024-05-06 DIAGNOSIS — I10 PRIMARY HYPERTENSION: ICD-10-CM

## 2024-05-06 DIAGNOSIS — J45.20 MILD INTERMITTENT ASTHMA WITHOUT COMPLICATION: ICD-10-CM

## 2024-05-06 DIAGNOSIS — I48.0 PAROXYSMAL ATRIAL FIBRILLATION (HCC): ICD-10-CM

## 2024-05-06 DIAGNOSIS — I63.81 RIGHT-SIDED LACUNAR INFARCTION (HCC): ICD-10-CM

## 2024-05-06 PROCEDURE — G2211 COMPLEX E/M VISIT ADD ON: HCPCS | Performed by: INTERNAL MEDICINE

## 2024-05-06 PROCEDURE — 99214 OFFICE O/P EST MOD 30 MIN: CPT | Performed by: INTERNAL MEDICINE

## 2024-05-06 NOTE — PATIENT INSTRUCTIONS

## 2024-05-06 NOTE — PROGRESS NOTES
Follow-Up Note - Sleep [facility]   Josep Blackmon  68 y.o. male  :1955  MRN:[id number]  DOS:2024    CC: I saw this patient for follow-up in clinic today for Sleep disordered breathing, Coexisting Sleep and Medical Problems.  Patient received ot a refurbished dream Station Version 1 machine from [email or web address] over a year ago.. Interval changes: PTCA around 3 months ago    Results of a split study in : The diagnostic portion demonstrated an AHI of 16/h with minimum oxygen saturation of 90%.  During the therapeutic portion of the study, sleep disordered breathing was adequately remediated with CPAP at 10 cm H2O.    PFSH, Problem List, Medications & Allergies were reviewed in EMR.   He  has a past medical history of Allergic rhinitis due to pollen, Aortic aneurysm ([facility]), Alvares esophagus, Coronary artery disease, CPAP (continuous positive airway pressure) dependence, Diabetes ([facility]), GERD (gastroesophageal reflux disease), History of cardiac cath, Hyperlipidemia, Hypertension, Irregular heart beat, Mild intermittent asthma without complication, Positive PPD, Pulmonary embolism ([facility]), Sleep apnea, Stroke ([facility]), Syncopal episodes, and Vertigo.    He has a current medication list which includes the following prescription(s): amlodipine, clopidogrel, coq-10, ezetimibe, furosemide, klor-con m10, multivitamin, mupirocin, omega-3 fatty acids, pantoprazole, praluent, valsartan, xarelto, aspirin, bisacodyl, tums, denta 5000 plus, fluticasone, lorazepam, ofloxacin, polyethylene glycol, and sotalol.    PHYSIOLOGICAL DATA REVIEW : Using PAP > 4 hours/night 100%. Estimated TRINITY 0.7/hour with pressure of 10cm H2O ; patient has not been using non FDA approved devices to sanitize the machine.  INTERPRETATION: Compliance is excellent; Pressure setting is:optimal; ;   SUBJECTIVE: With respect to use of PAP, Josep  is experiencing no adverse effects:.He derives benefit.  Is satisfied with sleep and daytime function.   Sleep  "Routine: Josep reports getting 6-8 hrs sleep; he has no difficulty initiating or maintaining sleep . He arises needing an alarm and feels refreshed.Josep daytime sleepiness,.  He rated himself at Total score: 0 /24 on the [place] Sleepiness Scale.   Other issues: None reported.     Habits: Reports that he has never smoked. He has never used smokeless tobacco.,  Reports no history of alcohol use.,  Reports no history of drug use., Caffeine use:limited; Exercise routine: regular.      ROS: Significant for few pounds weight gain in the past year.  He is reporting no nasal or respiratory symptoms.  Acid reflux is controlled..  Has not had any recent episodes of atrial fibrillation.    EXAM: /82   Ht 5' 5\" (1.651 m)   Wt 110 kg (243 lb)   BMI 40.44 kg/m²     Wt Readings from Last 3 Encounters:   05/06/24 110 kg (243 lb)   04/18/24 111 kg (245 lb)   01/10/24 110 kg (242 lb 9.6 oz)      Patient is well groomed; well appearing.   CNS: Alert, orientated, speech clear & coherent  Psych: cooperative and in no distress. Mental state: Appears normal.  H&N: EOMI; NC/AT: No facial pressure marks, no rashes.    Skin/Extrem: col & hydration normal; no edema  Resp: Respiratory effort is normal  Physical findings otherwise essentially unchanged from previous.    IMPRESSION: Problem List Items & Comorbidities Addressed this Visit    1. VIRGEN (obstructive sleep apnea)  Cpap DME      2. Mild intermittent asthma without complication        3. Primary hypertension        4. Paroxysmal atrial fibrillation (HCC)        5. Atherosclerosis of native coronary artery of native heart without angina pectoris        6. Atypical atrial flutter (HCC)        7. Right-sided lacunar infarction (HCC)        8. Obesity (BMI 30-39.9)            PLAN:  I reviewed results of prior studies and physiologic data with the patient.   I discussed treatment options with risks and benefits.  Treatment with  PAP is medically necessary and Josep is " "agreable to continue use.   Care of equipment, methods to improve comfort using PAP and importance of compliance with therapy were discussed.  Pressure setting: continue 10 cmH2O.    The patient's current unit has now reached its 5 yr reasonable, useful life and needs to be replaced.  Rx provided to replace machine, supplies and Care coordinated with DME provider.   Discussed strategies for weight reduction.    Follow-up is advised in 2 months to monitor progress, compliance and to adjust therapy.     Thank you for allowing me to participate in the care of this patient.    Sincerely,     Authenticated electronically on 05/06/24   Board Certified Specialist     Portions of the record may have been created with voice recognition software. Occasional wrong word or \"sound a like\" substitutions may have occurred due to the inherent limitations of voice recognition software. There may also be notations and random deletions of words or characters from malfunctioning software. Read the chart carefully and recognize, using context, where substitutions/deletions have occurred.    "

## 2024-05-07 ENCOUNTER — OFFICE VISIT (OUTPATIENT)
Dept: INTERNAL MEDICINE CLINIC | Facility: CLINIC | Age: 69
End: 2024-05-07
Payer: COMMERCIAL

## 2024-05-07 VITALS
SYSTOLIC BLOOD PRESSURE: 136 MMHG | HEIGHT: 65 IN | OXYGEN SATURATION: 96 % | HEART RATE: 65 BPM | BODY MASS INDEX: 39.65 KG/M2 | DIASTOLIC BLOOD PRESSURE: 80 MMHG | WEIGHT: 238 LBS

## 2024-05-07 DIAGNOSIS — E78.49 OTHER HYPERLIPIDEMIA: ICD-10-CM

## 2024-05-07 DIAGNOSIS — I10 PRIMARY HYPERTENSION: ICD-10-CM

## 2024-05-07 DIAGNOSIS — Z00.8 MEDICAL CLEARANCE FOR INCARCERATION: Primary | ICD-10-CM

## 2024-05-07 PROBLEM — Z71.89 COMPLEX CARE COORDINATION: Status: ACTIVE | Noted: 2024-05-07

## 2024-05-07 PROCEDURE — 99213 OFFICE O/P EST LOW 20 MIN: CPT

## 2024-05-07 PROCEDURE — G2211 COMPLEX E/M VISIT ADD ON: HCPCS

## 2024-05-07 NOTE — PROGRESS NOTES
Name: Josep Blackmon      : 1955      MRN: [id number]  Encounter Provider: Ricki Cantrell MD  Encounter Date: 2024   Encounter department: [facility]    Assessment & Plan     1. Medical clearance for DOT  Assessment & Plan:  Presented to the office for clearance for DOT  Has a history of HTN, VIRGEN on CPAP, obesity, Hyperlipidemia  Recent labs within normal range  Monitor his blood pressure regularly at home, ranges in 120s  Regularly follows up with cardiology  Currently his medical conditions are stable, no limitations for work      2. Primary hypertension  Assessment & Plan:  Controlled.  Continue Norvasc, valsartan.  Continue activity and exercise. Monitor BP at home      3. Other hyperlipidemia  Assessment & Plan:  Recent lipid panel is within normal range.  On Zetia and Praluent.  Intolerance to statin             Subjective      68-year-old male with history of hypertension, hyperlipidemia, VIRGEN on CPAP, obesity came in for medical clearance for DOT and form filled up.  Currently patient's medical conditions are stable, monitors his blood pressure at home ranges in 120s, patient takes his all medications regularly with compliance.  He is not complaining of any symptoms, denies headache, dizziness/lightheadedness, chest pain,, shortness of breath, abdominal pain, urinary symptoms.  Has trace lower extremity edema which he attributes to his venous insufficiency and uses compression stocking for same.  Patient has his annual Medicare wellness visit scheduled for 2024.      Review of Systems   Constitutional:  Negative for chills and fever.   HENT:  Negative for ear pain and sore throat.    Eyes:  Negative for pain and visual disturbance.   Respiratory:  Negative for cough and shortness of breath.    Cardiovascular:  Negative for chest pain and palpitations.   Gastrointestinal:  Negative for abdominal pain and vomiting.   Genitourinary:  Negative for dysuria and hematuria.    Musculoskeletal:  Negative for arthralgias and back pain.   Skin:  Negative for color change and rash.   Neurological:  Negative for seizures and syncope.   All other systems reviewed and are negative.      Current Outpatient Medications on File Prior to Visit   Medication Sig    clopidogrel (PLAVIX) 75 mg tablet Take 75 mg by mouth daily    Coenzyme Q10 (COQ-10) 100 MG CAPS Take 100 mg by mouth daily     ezetimibe (ZETIA) 10 mg tablet TAKE 1 TABLET BY MOUTH EVERY DAY    furosemide (LASIX) 20 mg tablet TAKE 1 TABLET (20 MG TOTAL) BY MOUTH DAILY.    Klor-Con M10 10 MEQ tablet TAKE 2 TABLETS BY MOUTH DAILY    pantoprazole (PROTONIX) 20 mg tablet Take 1 tablet (20 mg total) by mouth daily    Praluent 75 MG/ML SOAJ Inject 75 mg under the skin every 14 (fourteen) days    sotalol (BETAPACE) 80 mg tablet Take 80 mg by mouth 2 (two) times a day Take half tablet 2 times a day    valsartan (DIOVAN) 320 MG tablet TAKE 1 TABLET BY MOUTH EVERY DAY    XARELTO 20 MG tablet TAKE 1 TABLET BY MOUTH ONCE NDAILY WITH DINNER    amLODIPine (NORVASC) 10 mg tablet TAKE 1 TABLET BY MOUTH EVERY DAY    aspirin 81 MG tablet Take 81 mg by mouth    bisacodyl (DULCOLAX) 5 mg EC tablet Take 2 tablets (10 mg total) by mouth once for 1 dose (Patient not taking: Reported on 4/18/2024)    calcium carbonate (TUMS) 500 mg chewable tablet Chew 2 tablets every 4 (four) hours as needed  (Patient not taking: Reported on 4/18/2024)    DENTA 5000 PLUS 1.1 % CREA  (Patient not taking: Reported on 10/17/2022)    fluticasone (FLONASE) 50 mcg/act nasal spray SPRAY 2 SPRAYS INTO EACH NOSTRIL EVERY DAY (Patient not taking: Reported on 4/18/2024)    LORazepam (Ativan) 0.5 mg tablet Take 1 tablet (0.5 mg total) by mouth daily as needed for anxiety (Please take 1 tablet 1 hour prior to the MRI please get a ride to and from the MRI) (Patient not taking: Reported on 4/18/2024)    multivitamin (THERAGRAN) TABS Take 1 tablet by mouth Gummy form    mupirocin (BACTROBAN)  "2 % ointment APPLY TO AFFECTED AREA 3 TIMES A DAY    ofloxacin (OCUFLOX) 0.3 % ophthalmic solution Administer 1 drop into the left eye 4 (four) times a day (Patient not taking: Reported on 10/17/2022)    Omega-3 Fatty Acids (FISH OIL PO) Take 1 capsule by mouth daily Gummy form    polyethylene glycol (GOLYTELY) 4000 mL solution Take 4,000 mL by mouth once for 1 dose       Objective     /80   Pulse 65   Ht 5' 5\" (1.651 m)   Wt 108 kg (238 lb)   SpO2 96%   BMI 39.61 kg/m²     Physical Exam  Constitutional:       General: He is not in acute distress.     Appearance: Normal appearance.   HENT:      Head: Normocephalic.   Cardiovascular:      Rate and Rhythm: Normal rate and regular rhythm.      Heart sounds: No murmur heard.     No gallop.   Pulmonary:      Breath sounds: No rhonchi or rales.   Abdominal:      General: Abdomen is flat.      Tenderness: There is no abdominal tenderness.   Musculoskeletal:         General: Normal range of motion.      Cervical back: Normal range of motion.      Right lower leg: No edema.      Left lower leg: No edema.   Neurological:      Mental Status: He is alert.       Ricki Cantrell MD    " No

## 2024-05-07 NOTE — ASSESSMENT & PLAN NOTE
Presented to the office for clearance for DOT  Has a history of HTN, VIRGEN on CPAP, obesity, Hyperlipidemia  Recent labs within normal range  Monitor his blood pressure regularly at home, ranges in 120s  Regularly follows up with cardiology  Currently his medical conditions are stable, no limitations for work

## 2024-05-08 ENCOUNTER — TELEPHONE (OUTPATIENT)
Dept: SLEEP CENTER | Facility: CLINIC | Age: 69
End: 2024-05-08

## 2024-05-08 DIAGNOSIS — G47.33 OSA (OBSTRUCTIVE SLEEP APNEA): Primary | ICD-10-CM

## 2024-05-08 NOTE — TELEPHONE ENCOUNTER
CPAP script and clinical informations sent to [employer] via [facility].    Signed office visit note sent to [employer] via [facility].

## 2024-05-09 LAB
DME PARACHUTE DELIVERY DATE REQUESTED: NORMAL
DME PARACHUTE ITEM DESCRIPTION: NORMAL
DME PARACHUTE ORDER STATUS: NORMAL
DME PARACHUTE SUPPLIER NAME: NORMAL
DME PARACHUTE SUPPLIER PHONE: NORMAL

## 2024-05-10 DIAGNOSIS — K22.710 BARRETT'S ESOPHAGUS WITH LOW GRADE DYSPLASIA: ICD-10-CM

## 2024-05-13 ENCOUNTER — OFFICE VISIT (OUTPATIENT)
Dept: INTERNAL MEDICINE CLINIC | Facility: CLINIC | Age: 69
End: 2024-05-13
Payer: COMMERCIAL

## 2024-05-13 VITALS
SYSTOLIC BLOOD PRESSURE: 128 MMHG | RESPIRATION RATE: 16 BRPM | HEIGHT: 65 IN | OXYGEN SATURATION: 98 % | DIASTOLIC BLOOD PRESSURE: 78 MMHG | BODY MASS INDEX: 39.69 KG/M2 | WEIGHT: 238.2 LBS | HEART RATE: 51 BPM

## 2024-05-13 DIAGNOSIS — K21.9 GASTROESOPHAGEAL REFLUX DISEASE WITHOUT ESOPHAGITIS: Chronic | ICD-10-CM

## 2024-05-13 DIAGNOSIS — Z12.5 SCREENING PSA (PROSTATE SPECIFIC ANTIGEN): ICD-10-CM

## 2024-05-13 DIAGNOSIS — R73.03 PREDIABETES: ICD-10-CM

## 2024-05-13 DIAGNOSIS — Z00.00 MEDICARE ANNUAL WELLNESS VISIT, SUBSEQUENT: ICD-10-CM

## 2024-05-13 DIAGNOSIS — I25.10 ATHEROSCLEROSIS OF NATIVE CORONARY ARTERY OF NATIVE HEART WITHOUT ANGINA PECTORIS: ICD-10-CM

## 2024-05-13 DIAGNOSIS — I10 PRIMARY HYPERTENSION: ICD-10-CM

## 2024-05-13 DIAGNOSIS — E78.49 OTHER HYPERLIPIDEMIA: ICD-10-CM

## 2024-05-13 DIAGNOSIS — E66.9 OBESITY (BMI 30-39.9): ICD-10-CM

## 2024-05-13 DIAGNOSIS — I20.89 STABLE ANGINA: ICD-10-CM

## 2024-05-13 DIAGNOSIS — Z23 ENCOUNTER FOR IMMUNIZATION: Primary | ICD-10-CM

## 2024-05-13 PROCEDURE — 90677 PCV20 VACCINE IM: CPT

## 2024-05-13 PROCEDURE — 99214 OFFICE O/P EST MOD 30 MIN: CPT | Performed by: INTERNAL MEDICINE

## 2024-05-13 PROCEDURE — G0009 ADMIN PNEUMOCOCCAL VACCINE: HCPCS

## 2024-05-13 PROCEDURE — G0439 PPPS, SUBSEQ VISIT: HCPCS | Performed by: INTERNAL MEDICINE

## 2024-05-13 RX ORDER — PANTOPRAZOLE SODIUM 20 MG/1
20 TABLET, DELAYED RELEASE ORAL DAILY
Qty: 90 TABLET | Refills: 1 | Status: SHIPPED | OUTPATIENT
Start: 2024-05-13

## 2024-05-13 NOTE — PROGRESS NOTES
Assessment and Plan:     Problem List Items Addressed This Visit        Cardiovascular and Mediastinum    HTN (hypertension)     Hypertension - controlled, I have counseled patient following healthy balance diet, I would like the patient reduce sodium, exercise routinely, I would like the patient continued the med current medical regiment and we will continue to monitor.         Atherosclerosis of native coronary artery of native heart without angina pectoris     Clinically stable and doing well continue the current medical regiment will continue monitor.  Working with cardiologist at [facility] repeat stress test, cardiac catheterization and stenting         Stable angina     Clinically stable and doing well continue the current medical regiment will continue monitor.            Digestive    Gastroesophageal reflux disease without esophagitis/Alvares's esophagus (Chronic)     Clinically stable and doing well continue the current medical regiment will continue monitor.  Continue ulcer free diet, PPI weight loss            Other    Prediabetes     Pre diabetes -I have counseled the patient to follow a healthy balanced diet, I have counseled patient reduce carbohydrates and sweets in the diet, I would like the patient exercise routinely.  I will be checking hemoglobin A1c and comprehensive metabolic panel.  Have counseled patient about the prevention of diabetes, and the risk of progression to type 2 diabetes.         Relevant Orders    Hemoglobin A1C    Hyperlipidemia     Hyperlipidemia controlled continue with current medical regiment recommend a low-cholesterol diet and recommend routine exercise we will continue to monitor the progress.  Continue Zetia/PCSK9 tolerating well         Relevant Orders    Comprehensive metabolic panel    Lipid Panel with Direct LDL reflex    Obesity (BMI 30-39.9)     Obesity -I have counseled patient following healthy and balanced diet, I would like the patient to lose weight, I  would like the patient exercise routinely; we will continue monitor the patient's progress.         Medicare annual wellness visit, subsequent     Assessment and plan 1.  Medicare subsequent annual wellness examination overall the patient is clinically stable and doing well, we encouraged the patient to follow a healthy and balanced diet.  We recommend that the patient exercise routinely approximately 30 minutes 5 times per week .  We have reviewed the patient's vaccines and have made recommendations for updates if necessary annual flu shot    .  We will be ordering screening laboratories which are age appropriate.  Return to the office in   6 months   call if any problems.        Other Visit Diagnoses     Encounter for immunization    -  Primary    Relevant Orders    Pneumococcal Conjugate Vaccine 20-valent (Pcv20) (Completed)    Screening PSA (prostate specific antigen)        Relevant Orders    PSA, Total Screen          Depression Screening and Follow-up Plan: Patient was screened for depression during today's encounter. They screened negative with a PHQ-2 score of 0.      Preventive health issues were discussed with patient, and age appropriate screening tests were ordered as noted in patient's After Visit Summary.  Personalized health advice and appropriate referrals for health education or preventive services given if needed, as noted in patient's After Visit Summary.     History of Present Illness:     Patient presents for a Medicare Wellness Visit    HPI 68-year old male coming in for a follow up visit regarding stable angina, ASCVD, CAD status post stent, hypertension, GERD, hyperlipidemia, obesity and prediabetes; the patient reports me compliant taking medications without untoward side effects the.  The patient is here to review his medical condition, update me on the medical condition and the patient reports me no hospitalizations and no ER visits.  No injuries no illnesses he reports me he is following  healthy and balanced diet Oliver and nap and in fact he is lost a significant amount of weight he feels well he is swimming daily.  Here to review laboratories in detail no injuries no illnesses reported to me recent abnormal stress test undergoing catheterization found to have several blockages 1 requiring stenting the other blockages requiring medical management.  He is now on a PSK 9 inhibitor is tolerating well and is cholesterol levels have optimized today we did review his testing, stress testing and cardiology notes from [facility].  He is increase his swimming to daily swimming daily and using tom for wt loss 1400;  Patient Care Team:  Roman Champion DO as PCP - General  MD Elliott Jasso MD Zheng Lin, MD W Terence Reilly, MD (Colon and Rectal Surgery)  W Jorge Aparicio MD as Endoscopist     Review of Systems:     Review of Systems   Constitutional:  Negative for activity change, appetite change and unexpected weight change.   HENT:  Negative for congestion and postnasal drip.    Eyes:  Negative for visual disturbance.   Respiratory:  Negative for cough and shortness of breath.    Cardiovascular:  Negative for chest pain.   Gastrointestinal:  Negative for abdominal pain, diarrhea, nausea and vomiting.   Neurological:  Negative for dizziness, light-headedness and headaches.   Hematological:  Negative for adenopathy.        Problem List:     Patient Active Problem List   Diagnosis   • HTN (hypertension)   • Morbid obesity (HCC)   • VIRGEN on CPAP   • Prediabetes   • Right-sided lacunar infarction (HCC)   • Abnormal gait   • Aortic valve disorder   • Arthralgia of knee   • Carotid bruit   • Atherosclerosis of native coronary artery of native heart without angina pectoris   • Elevated ALT measurement   • Hemorrhoid   • Elevated liver enzymes   • Ambulatory dysfunction   • Horseshoe kidney   • Hyperlipidemia   • Microscopic hematuria   • Mild intermittent asthma without complication   •  Status post total left knee replacement   • Thoracic aortic aneurysm (HCC)   • Venous insufficiency of both lower extremities   • History of transient ischemic attack (TIA)   • Rectal bleeding   • Wheeze   • Mass in chest   • Change in stool   • Gastroesophageal reflux disease without esophagitis/Alvares's esophagus   • Hypokalemia   • Paroxysmal atrial fibrillation (HCC)   • History of CVA (cerebrovascular accident)   • Asthma   • Atypical atrial flutter (HCC)   • Urine abnormality   • Allergy to iodine   • Anticoagulated   • High risk medication use   • Nonrheumatic aortic valve insufficiency   • Rash   • Obesity (BMI 30-39.9)   • Bradycardia   • Finger abrasion   • Acute right-sided low back pain without sciatica   • COVID-19   • VIRGEN (obstructive sleep apnea)   • Memory change   • Folliculitis   • Care coordination   • Stable angina   • Medicare annual wellness visit, subsequent      Past Medical and Surgical History:     Past Medical History:   Diagnosis Date   • Allergic rhinitis due to pollen     last assessed 10/01/15   • Aortic aneurysm (HCC)     4.3 cm  6/2018 last check   • Alvares esophagus    • Coronary artery disease    • CPAP (continuous positive airway pressure) dependence    • Diabetes ([facility])    • GERD (gastroesophageal reflux disease)    • History of cardiac cath    • Hyperlipidemia    • Hypertension    • Irregular heart beat     a flutter   • Mild intermittent asthma without complication     last assessed 10/01/15   • Positive PPD     last assesssed 12/21/15   • Pulmonary embolism ([facility])    • Sleep apnea    • Stroke ([facility])     unsure if/when happened; no weakness   • Syncopal episodes     1 year ago   • Vertigo     last assessed 04/24/17     Past Surgical History:   Procedure Laterality Date   • CARDIAC CATHETERIZATION      with stent   • COLONOSCOPY N/A 11/14/2018    Procedure: COLONOSCOPY;  Surgeon: GUERLINE Aparicio MD;  Location: [facility];  Service: Colorectal   • EGD AND COLONOSCOPY     •  ENDOSCOPIC ULTRASOUND (LOWER)  01/23/2020   • HERNIA REPAIR     • JOINT REPLACEMENT Left     knee   • MULTIPLE TOOTH EXTRACTIONS  05/2020   • TOTAL KNEE ARTHROPLASTY  08/29/2016    Dr Santoyo 07/28/16   • UPPER GASTROINTESTINAL ENDOSCOPY  10/21/2019   • URETHRA SURGERY      polyps removed      Family History:     Family History   Problem Relation Age of Onset   • Heart disease Mother    • Hypertension Mother    • Coronary artery disease Father    • Hypertension Father    • Heart disease Father         cardiac disorder   • Stomach cancer Maternal Grandmother    • Thyroid disease Neg Hx       Social History:     Social History     Socioeconomic History   • Marital status: /Civil Union     Spouse name: None   • Number of children: 2   • Years of education: None   • Highest education level: None   Occupational History   • None   Tobacco Use   • Smoking status: Never   • Smokeless tobacco: Never   Vaping Use   • Vaping status: Never Used   Substance and Sexual Activity   • Alcohol use: Never   • Drug use: No   • Sexual activity: Yes   Other Topics Concern   • None   Social History Narrative    Retired scuba paramedic for [employer]     Social Determinants of Health     Financial Resource Strain: Low Risk  (11/28/2023)    Received from [facility]    Overall Financial Resource Strain (CARDIA)    • Difficulty of Paying Living Expenses: Not very hard   Food Insecurity: No Food Insecurity (5/13/2024)    Hunger Vital Sign    • Worried About Running Out of Food in the Last Year: Never true    • Ran Out of Food in the Last Year: Never true   Transportation Needs: No Transportation Needs (5/13/2024)    PRAPARE - Transportation    • Lack of Transportation (Medical): No    • Lack of Transportation (Non-Medical): No   Physical Activity: Not on file   Stress: Not on file   Social Connections: Not on file   Intimate Partner Violence: Not At Risk (11/28/2023)    Received from [facility]     Humiliation, Afraid, Rape, and Kick questionnaire    • Fear of Current or Ex-Partner: No    • Emotionally Abused: No    • Physically Abused: No    • Sexually Abused: No   Housing Stability: Unknown (5/13/2024)    Housing Stability Vital Sign    • Unable to Pay for Housing in the Last Year: No    • Number of Places Lived in the Last Year: Not on file    • Unstable Housing in the Last Year: No      Medications and Allergies:     Current Outpatient Medications   Medication Sig Dispense Refill   • amLODIPine (NORVASC) 10 mg tablet TAKE 1 TABLET BY MOUTH EVERY DAY 90 tablet 1   • aspirin 81 MG tablet Take 81 mg by mouth     • clopidogrel (PLAVIX) 75 mg tablet Take 75 mg by mouth daily     • Coenzyme Q10 (COQ-10) 100 MG CAPS Take 100 mg by mouth daily      • ezetimibe (ZETIA) 10 mg tablet TAKE 1 TABLET BY MOUTH EVERY DAY 90 tablet 3   • furosemide (LASIX) 20 mg tablet TAKE 1 TABLET (20 MG TOTAL) BY MOUTH DAILY. 90 tablet 3   • Klor-Con M10 10 MEQ tablet TAKE 2 TABLETS BY MOUTH DAILY 180 tablet 1   • multivitamin (THERAGRAN) TABS Take 1 tablet by mouth Gummy form     • mupirocin (BACTROBAN) 2 % ointment APPLY TO AFFECTED AREA 3 TIMES A DAY 66 g 0   • Omega-3 Fatty Acids (FISH OIL PO) Take 1 capsule by mouth daily Gummy form     • Praluent 75 MG/ML SOAJ Inject 75 mg under the skin every 14 (fourteen) days     • sotalol (BETAPACE) 80 mg tablet Take 80 mg by mouth 2 (two) times a day Take half tablet 2 times a day     • valsartan (DIOVAN) 320 MG tablet TAKE 1 TABLET BY MOUTH EVERY DAY 90 tablet 1   • XARELTO 20 MG tablet TAKE 1 TABLET BY MOUTH ONCE NDAILY WITH DINNER 30 tablet 0   • fluticasone (FLONASE) 50 mcg/act nasal spray SPRAY 2 SPRAYS INTO EACH NOSTRIL EVERY DAY (Patient not taking: Reported on 4/18/2024) 48 mL 1   • pantoprazole (PROTONIX) 20 mg tablet TAKE 1 TABLET BY MOUTH EVERY DAY 90 tablet 1   • polyethylene glycol (GOLYTELY) 4000 mL solution Take 4,000 mL by mouth once for 1 dose 4000 mL 0     No current  facility-administered medications for this visit.     Allergies   Allergen Reactions   • Iodine - Food Allergy Shortness Of Breath     Other reaction(s): Respiratory Distress  Other reaction(s): Respiratory Distress   • Omnipaque [Iohexol] Anaphylaxis   • Erythromycin Hives     Other reaction(s): Other (See Comments)  Pleurisy   Pleurisy   Other reaction(s): Other (See Comments)  Pleurisy    • Iodixanol    • Lisinopril Cough   • Statins Myalgia      Immunizations:     Immunization History   Administered Date(s) Administered   • COVID-19 MODERNA VACC 0.5 ML IM 02/06/2021, 03/07/2021, 10/28/2021   • COVID-19 PFIZER VACCINE 0.3 ML IM 05/14/2022   • COVID-19 Pfizer mRNA vacc PF anita-sucrose 12 yr and older (Comirnaty) 11/04/2023   • Pneumococcal Conjugate Vaccine 20-valent (Pcv20), Polysace 05/13/2024   • Td (adult), adsorbed 08/23/2017   • Tdap 05/22/2018   • Zoster Vaccine Recombinant 06/04/2022, 08/04/2022      Health Maintenance:         Topic Date Due   • Colorectal Cancer Screening  10/01/2026   • Hepatitis C Screening  Completed         Topic Date Due   • COVID-19 Vaccine (6 - 2023-24 season) 12/30/2023      Medicare Screening Tests and Risk Assessments:     Josep is here for his Subsequent Wellness visit.     Health Risk Assessment:   Patient rates overall health as good. Patient feels that their physical health rating is slightly better. Patient is satisfied with their life. Eyesight was rated as same. Hearing was rated as same. Patient feels that their emotional and mental health rating is same. Patients states they are never, rarely angry. Patient states they are never, rarely unusually tired/fatigued. Pain experienced in the last 7 days has been none. Patient states that he has experienced no weight loss or gain in last 6 months.     Depression Screening:   PHQ-2 Score: 0      Fall Risk Screening:   In the past year, patient has experienced: no history of falling in past year      Home Safety:  Patient  does not have trouble with stairs inside or outside of their home. Patient has working smoke alarms and has working carbon monoxide detector. Home safety hazards include: none.     Nutrition:   Current diet is Regular, No Added Salt, Low Carb and Limited junk food.     Medications:   Patient is currently taking over-the-counter supplements. OTC medications include: see medication list. Patient is able to manage medications.     Activities of Daily Living (ADLs)/Instrumental Activities of Daily Living (IADLs):   Walk and transfer into and out of bed and chair?: Yes  Dress and groom yourself?: Yes    Bathe or shower yourself?: Yes    Feed yourself? Yes  Do your laundry/housekeeping?: Yes  Manage your money, pay your bills and track your expenses?: Yes  Make your own meals?: Yes    Do your own shopping?: Yes    Previous Hospitalizations:   Any hospitalizations or ED visits within the last 12 months?: No      Advance Care Planning:     Advanced directive counseling given: Yes      Cognitive Screening:   Provider or family/friend/caregiver concerned regarding cognition?: No    PREVENTIVE SCREENINGS      Cardiovascular Screening:    General: Screening Not Indicated and History Lipid Disorder      Diabetes Screening:     General: Screening Current      Colorectal Cancer Screening:     General: Screening Current      Prostate Cancer Screening:    General: Screening Current      Abdominal Aortic Aneurysm (AAA) Screening:    Risk factors include: age between 65-74 yo        Lung Cancer Screening:     General: Screening Not Indicated      Hepatitis C Screening:    General: Screening Current    Screening, Brief Intervention, and Referral to Treatment (SBIRT)    Screening  Typical number of drinks in a day: 0  Typical number of drinks in a week: 0  Interpretation: Low risk drinking behavior.    Single Item Drug Screening:  How often have you used an illegal drug (including marijuana) or a prescription medication for non-medical  "reasons in the past year? never    Single Item Drug Screen Score: 0  Interpretation: Negative screen for possible drug use disorder    No results found.     Physical Exam:     /78   Pulse (!) 51   Resp 16   Ht 5' 5\" (1.651 m)   Wt 108 kg (238 lb 3.2 oz)   SpO2 98%   BMI 39.64 kg/m²     Physical Exam  Vitals and nursing note reviewed.   Constitutional:       General: He is not in acute distress.     Appearance: Normal appearance. He is well-developed. He is obese. He is not ill-appearing, toxic-appearing or diaphoretic.   HENT:      Head: Normocephalic and atraumatic.      Right Ear: External ear normal.      Left Ear: External ear normal.      Nose: Nose normal.   Eyes:      Pupils: Pupils are equal, round, and reactive to light.   Cardiovascular:      Rate and Rhythm: Normal rate and regular rhythm.      Heart sounds: Normal heart sounds. No murmur heard.  Pulmonary:      Effort: Pulmonary effort is normal.      Breath sounds: Normal breath sounds.   Abdominal:      General: There is no distension.      Palpations: Abdomen is soft.      Tenderness: There is no abdominal tenderness. There is no guarding.   Neurological:      Mental Status: He is alert.          Roman Champion DO  "

## 2024-05-13 NOTE — ASSESSMENT & PLAN NOTE
Hyperlipidemia controlled continue with current medical regiment recommend a low-cholesterol diet and recommend routine exercise we will continue to monitor the progress.  Continue Zetia/PCSK9 tolerating well

## 2024-05-13 NOTE — ASSESSMENT & PLAN NOTE
Clinically stable and doing well continue the current medical regiment will continue monitor.  Working with cardiologist at [facility] repeat stress test, cardiac catheterization and stenting

## 2024-05-13 NOTE — PATIENT INSTRUCTIONS
Medicare Preventive Visit Patient Instructions  Thank you for completing your Welcome to Medicare Visit or Medicare Annual Wellness Visit today. Your next wellness visit will be due in one year (5/14/2025).  The screening/preventive services that you may require over the next 5-10 years are detailed below. Some tests may not apply to you based off risk factors and/or age. Screening tests ordered at today's visit but not completed yet may show as past due. Also, please note that scanned in results may not display below.  Preventive Screenings:  Service Recommendations Previous Testing/Comments   Colorectal Cancer Screening  Colonoscopy    Fecal Occult Blood Test (FOBT)/Fecal Immunochemical Test (FIT)  Fecal DNA/Cologuard Test  Flexible Sigmoidoscopy Age: 45-75 years old   Colonoscopy: every 10 years (May be performed more frequently if at higher risk)  OR  FOBT/FIT: every 1 year  OR  Cologuard: every 3 years  OR  Sigmoidoscopy: every 5 years  Screening may be recommended earlier than age 45 if at higher risk for colorectal cancer. Also, an individualized decision between you and your healthcare provider will decide whether screening between the ages of 76-85 would be appropriate. Colonoscopy: 10/02/2023  FOBT/FIT: Not on file  Cologuard: Not on file  Sigmoidoscopy: Not on file          Prostate Cancer Screening Individualized decision between patient and health care provider in men between ages of 55-69   Medicare will cover every 12 months beginning on the day after your 50th birthday PSA: 1.10 ng/mL           Hepatitis C Screening Once for adults born between 1945 and 1965  More frequently in patients at high risk for Hepatitis C Hep C Antibody: 12/01/2015        Diabetes Screening 1-2 times per year if you're at risk for diabetes or have pre-diabetes Fasting glucose: 101 mg/dL (3/5/2024)  A1C: 5.6 % (3/5/2024)      Cholesterol Screening Once every 5 years if you don't have a lipid disorder. May order more often  based on risk factors. Lipid panel: 03/05/2024         Other Preventive Screenings Covered by Medicare:  Abdominal Aortic Aneurysm (AAA) Screening: covered once if your at risk. You're considered to be at risk if you have a family history of AAA or a male between the age of 65-75 who smoking at least 100 cigarettes in your lifetime.  Lung Cancer Screening: covers low dose CT scan once per year if you meet all of the following conditions: (1) Age 55-77; (2) No signs or symptoms of lung cancer; (3) Current smoker or have quit smoking within the last 15 years; (4) You have a tobacco smoking history of at least 20 pack years (packs per day x number of years you smoked); (5) You get a written order from a healthcare provider.  Glaucoma Screening: covered annually if you're considered high risk: (1) You have diabetes OR (2) Family history of glaucoma OR (3)  aged 50 and older OR (4)  American aged 65 and older  Osteoporosis Screening: covered every 2 years if you meet one of the following conditions: (1) Have a vertebral abnormality; (2) On glucocorticoid therapy for more than 3 months; (3) Have primary hyperparathyroidism; (4) On osteoporosis medications and need to assess response to drug therapy.  HIV Screening: covered annually if you're between the age of 15-65. Also covered annually if you are younger than 15 and older than 65 with risk factors for HIV infection. For pregnant patients, it is covered up to 3 times per pregnancy.    Immunizations:  Immunization Recommendations   Influenza Vaccine Annual influenza vaccination during flu season is recommended for all persons aged >= 6 months who do not have contraindications   Pneumococcal Vaccine   * Pneumococcal conjugate vaccine = PCV13 (Prevnar 13), PCV15 (Vaxneuvance), PCV20 (Prevnar 20)  * Pneumococcal polysaccharide vaccine = PPSV23 (Pneumovax) Adults 19-63 yo with certain risk factors or if 65+ yo  If never received any pneumonia vaccine:  recommend Prevnar 20 (PCV20)  Give PCV20 if previously received 1 dose of PCV13 or PPSV23   Hepatitis B Vaccine 3 dose series if at intermediate or high risk (ex: diabetes, end stage renal disease, liver disease)   Respiratory syncytial virus (RSV) Vaccine - COVERED BY MEDICARE PART D  * RSVPreF3 (Arexvy) CDC recommends that adults 60 years of age and older may receive a single dose of RSV vaccine using shared clinical decision-making (SCDM)   Tetanus (Td) Vaccine - COST NOT COVERED BY MEDICARE PART B Following completion of primary series, a booster dose should be given every 10 years to maintain immunity against tetanus. Td may also be given as tetanus wound prophylaxis.   Tdap Vaccine - COST NOT COVERED BY MEDICARE PART B Recommended at least once for all adults. For pregnant patients, recommended with each pregnancy.   Shingles Vaccine (Shingrix) - COST NOT COVERED BY MEDICARE PART B  2 shot series recommended in those 19 years and older who have or will have weakened immune systems or those 50 years and older     Health Maintenance Due:      Topic Date Due   • Colorectal Cancer Screening  10/01/2026   • Hepatitis C Screening  Completed     Immunizations Due:      Topic Date Due   • Pneumococcal Vaccine: 65+ Years (1 of 2 - PCV) Never done   • COVID-19 Vaccine (6 - 2023-24 season) 12/30/2023     Advance Directives   What are advance directives?  Advance directives are legal documents that state your wishes and plans for medical care. These plans are made ahead of time in case you lose your ability to make decisions for yourself. Advance directives can apply to any medical decision, such as the treatments you want, and if you want to donate organs.   What are the types of advance directives?  There are many types of advance directives, and each state has rules about how to use them. You may choose a combination of any of the following:  Living will:  This is a written record of the treatment you want. You can  also choose which treatments you do not want, which to limit, and which to stop at a certain time. This includes surgery, medicine, IV fluid, and tube feedings.   Durable power of  for healthcare (DPAHC):  This is a written record that states who you want to make healthcare choices for you when you are unable to make them for yourself. This person, called a proxy, is usually a family member or a friend. You may choose more than 1 proxy.  Do not resuscitate (DNR) order:  A DNR order is used in case your heart stops beating or you stop breathing. It is a request not to have certain forms of treatment, such as CPR. A DNR order may be included in other types of advance directives.  Medical directive:  This covers the care that you want if you are in a coma, near death, or unable to make decisions for yourself. You can list the treatments you want for each condition. Treatment may include pain medicine, surgery, blood transfusions, dialysis, IV or tube feedings, and a ventilator (breathing machine).  Values history:  This document has questions about your views, beliefs, and how you feel and think about life. This information can help others choose the care that you would choose.  Why are advance directives important?  An advance directive helps you control your care. Although spoken wishes may be used, it is better to have your wishes written down. Spoken wishes can be misunderstood, or not followed. Treatments may be given even if you do not want them. An advance directive may make it easier for your family to make difficult choices about your care.   Weight Management   Why it is important to manage your weight:  Being overweight increases your risk of health conditions such as heart disease, high blood pressure, type 2 diabetes, and certain types of cancer. It can also increase your risk for osteoarthritis, sleep apnea, and other respiratory problems. Aim for a slow, steady weight loss. Even a small amount of  weight loss can lower your risk of health problems.  How to lose weight safely:  A safe and healthy way to lose weight is to eat fewer calories and get regular exercise. You can lose up about 1 pound a week by decreasing the number of calories you eat by 500 calories each day.   Healthy meal plan for weight management:  A healthy meal plan includes a variety of foods, contains fewer calories, and helps you stay healthy. A healthy meal plan includes the following:  Eat whole-grain foods more often.  A healthy meal plan should contain fiber. Fiber is the part of grains, fruits, and vegetables that is not broken down by your body. Whole-grain foods are healthy and provide extra fiber in your diet. Some examples of whole-grain foods are whole-wheat breads and pastas, oatmeal, brown rice, and bulgur.  Eat a variety of vegetables every day.  Include dark, leafy greens such as spinach, kale, sameer greens, and mustard greens. Eat yellow and orange vegetables such as carrots, sweet potatoes, and winter squash.   Eat a variety of fruits every day.  Choose fresh or canned fruit (canned in its own juice or light syrup) instead of juice. Fruit juice has very little or no fiber.  Eat low-fat dairy foods.  Drink fat-free (skim) milk or 1% milk. Eat fat-free yogurt and low-fat cottage cheese. Try low-fat cheeses such as mozzarella and other reduced-fat cheeses.  Choose meat and other protein foods that are low in fat.  Choose beans or other legumes such as split peas or lentils. Choose fish, skinless poultry (chicken or turkey), or lean cuts of red meat (beef or pork). Before you cook meat or poultry, cut off any visible fat.   Use less fat and oil.  Try baking foods instead of frying them. Add less fat, such as margarine, sour cream, regular salad dressing and mayonnaise to foods. Eat fewer high-fat foods. Some examples of high-fat foods include french fries, doughnuts, ice cream, and cakes.  Eat fewer sweets.  Limit foods and  drinks that are high in sugar. This includes candy, cookies, regular soda, and sweetened drinks.  Exercise:  Exercise at least 30 minutes per day on most days of the week. Some examples of exercise include walking, biking, dancing, and swimming. You can also fit in more physical activity by taking the stairs instead of the elevator or parking farther away from stores. Ask your healthcare provider about the best exercise plan for you.      © Copyright [employer] 2018 Information is for End User's use only and may not be sold, redistributed or otherwise used for commercial purposes. All illustrations and images included in CareNotes® are the copyrighted property of A.D.A[employer]M., Inc. or [employer]

## 2024-05-13 NOTE — ASSESSMENT & PLAN NOTE
Clinically stable and doing well continue the current medical regiment will continue monitor.  Continue ulcer free diet, PPI weight loss

## 2024-05-14 ENCOUNTER — TELEPHONE (OUTPATIENT)
Dept: SLEEP CENTER | Facility: CLINIC | Age: 69
End: 2024-05-14

## 2024-05-14 LAB

## 2024-05-22 ENCOUNTER — VBI (OUTPATIENT)
Dept: ADMINISTRATIVE | Facility: OTHER | Age: 69
End: 2024-05-22

## 2024-05-26 DIAGNOSIS — I10 ESSENTIAL HYPERTENSION: ICD-10-CM

## 2024-05-26 DIAGNOSIS — I10 PRIMARY HYPERTENSION: ICD-10-CM

## 2024-05-27 RX ORDER — AMLODIPINE BESYLATE 10 MG/1
TABLET ORAL
Qty: 90 TABLET | Refills: 1 | Status: SHIPPED | OUTPATIENT
Start: 2024-05-27

## 2024-05-27 RX ORDER — VALSARTAN 320 MG/1
TABLET ORAL
Qty: 90 TABLET | Refills: 1 | Status: SHIPPED | OUTPATIENT
Start: 2024-05-27

## 2024-06-12 PROBLEM — Z00.00 MEDICARE ANNUAL WELLNESS VISIT, SUBSEQUENT: Status: RESOLVED | Noted: 2024-05-13 | Resolved: 2024-06-12

## 2024-06-14 NOTE — TELEPHONE ENCOUNTER
LOV 6/5/24  Next Appt 6/9/25  Labs 4/26/24     Patient was set up on Cpap @ 11cm on 5/14/24 He was fitted with and given Airfit F40 Large FFM.

## 2024-08-16 ENCOUNTER — TELEPHONE (OUTPATIENT)
Dept: SLEEP CENTER | Facility: CLINIC | Age: 69
End: 2024-08-16

## 2024-08-16 NOTE — TELEPHONE ENCOUNTER
Patient called in regards to his CPAP. Patient stated that he has to be seen soon in order to get a new CPAP. Informed patient that he has an upcoming appt with his sleep medicine doctor on 09/18/2024 at 4:00 pm. Patient apologized being that he did not realized that he did had an appt.

## 2024-09-13 ENCOUNTER — TELEPHONE (OUTPATIENT)
Dept: GASTROENTEROLOGY | Facility: MEDICAL CENTER | Age: 69
End: 2024-09-13

## 2024-09-18 ENCOUNTER — OFFICE VISIT (OUTPATIENT)
Dept: SLEEP CENTER | Facility: CLINIC | Age: 69
End: 2024-09-18
Payer: COMMERCIAL

## 2024-09-18 VITALS
HEIGHT: 65 IN | DIASTOLIC BLOOD PRESSURE: 68 MMHG | SYSTOLIC BLOOD PRESSURE: 118 MMHG | BODY MASS INDEX: 38.99 KG/M2 | WEIGHT: 234 LBS

## 2024-09-18 DIAGNOSIS — G47.33 OSA (OBSTRUCTIVE SLEEP APNEA): Primary | ICD-10-CM

## 2024-09-18 DIAGNOSIS — E66.9 OBESITY (BMI 30-39.9): ICD-10-CM

## 2024-09-18 DIAGNOSIS — I48.4 ATYPICAL ATRIAL FLUTTER (HCC): ICD-10-CM

## 2024-09-18 DIAGNOSIS — I25.10 ATHEROSCLEROSIS OF NATIVE CORONARY ARTERY OF NATIVE HEART WITHOUT ANGINA PECTORIS: ICD-10-CM

## 2024-09-18 DIAGNOSIS — J45.20 MILD INTERMITTENT ASTHMA WITHOUT COMPLICATION: ICD-10-CM

## 2024-09-18 DIAGNOSIS — I10 PRIMARY HYPERTENSION: ICD-10-CM

## 2024-09-18 DIAGNOSIS — I48.0 PAROXYSMAL ATRIAL FIBRILLATION (HCC): ICD-10-CM

## 2024-09-18 DIAGNOSIS — I63.81 RIGHT-SIDED LACUNAR INFARCTION (HCC): ICD-10-CM

## 2024-09-18 PROCEDURE — 99214 OFFICE O/P EST MOD 30 MIN: CPT | Performed by: INTERNAL MEDICINE

## 2024-09-18 PROCEDURE — G2211 COMPLEX E/M VISIT ADD ON: HCPCS | Performed by: INTERNAL MEDICINE

## 2024-09-18 NOTE — PROGRESS NOTES
Follow-Up Note - Sleep [facility]   Josep Blackmon  69 y.o. male  :1955  MRN:[id number]  DOS:2024    CC: I saw this patient for follow-up in clinic today for Sleep disordered breathing, Coexisting Sleep and Medical Problems.  He is using a ResMed machine.. Interval changes: None reported.      Results of a split study in 2015: The diagnostic portion demonstrated an AHI of 16/h with minimum oxygen saturation of 90%.  During the therapeutic portion of the study, sleep disordered breathing was adequately remediated with CPAP at 10 cm H2O.    PFSH, Problem List, Medications & Allergies were reviewed in EMR.   He  has a past medical history of Allergic rhinitis due to pollen, Aortic aneurysm ([facility]), Alvares esophagus, Coronary artery disease, CPAP (continuous positive airway pressure) dependence, Diabetes ([facility]), GERD (gastroesophageal reflux disease), History of cardiac cath, Hyperlipidemia, Hypertension, Irregular heart beat, Mild intermittent asthma without complication, Positive PPD, Pulmonary embolism ([facility]), Sleep apnea, Stroke ([facility]), Syncopal episodes, and Vertigo.    He has a current medication list which includes the following prescription(s): amlodipine, aspirin, clopidogrel, coq-10, ezetimibe, furosemide, klor-con m10, multivitamin, mupirocin, omega-3 fatty acids, pantoprazole, praluent, sotalol, valsartan, xarelto, fluticasone, and polyethylene glycol.    PHYSIOLOGICAL DATA REVIEW : Using PAP > 4 hours/night 100%. Estimated TRINITY 1.3/hour with pressure of 10cm H2O ; use is limited by  .  INTERPRETATION: Compliance is excellent; Pressure setting is:optimal; ;   SUBJECTIVE: With respect to use of PAP, Josep  is experiencing no adverse effects:.He derives benefit..  Feels satisfied with sleep and daytime function.   Sleep Routine: Josep reports getting 6-8 hrs sleep; he has no difficulty initiating or maintaining sleep . He arises spontaneously and feels more refreshed since on Rx.Josep]reports  "somewhat improved excessive daytime sleepiness,.  He rated himself at Total score: 0 /24 on the Curtis Sleepiness Scale.   Other issues: None reported.     Habits: Reports that he has never smoked. He has never used smokeless tobacco.,  Reports no history of alcohol use.,  Reports no history of drug use., Caffeine use:limited until  ; Exercise routine: regular.      ROS: Significant for weight is stable within a few pounds.  Allergies and acid reflux are controlled.  He has not had any further episodes of atrial fibrillation..    EXAM: /68   Ht 5' 5\" (1.651 m)   Wt 106 kg (234 lb)   BMI 38.94 kg/m²     Wt Readings from Last 3 Encounters:   09/18/24 106 kg (234 lb)   05/13/24 108 kg (238 lb 3.2 oz)   05/07/24 108 kg (238 lb)      Patient is well groomed; well appearing.   CNS: Alert, orientated, speech clear & coherent  Psych: cooperative and in no distress. Mental state: Appears normal.  H&N: EOMI; NC/AT: No facial pressure marks, no rashes.    Skin/Extrem: col & hydration normal; no edema  Resp: Respiratory effort is normal  Physical findings otherwise essentially unchanged from previous.    IMPRESSION: Problem List Items & Comorbidities Addressed this Visit    1. VIRGEN (obstructive sleep apnea)  PAP DME Resupply/Reorder      2. Mild intermittent asthma without complication        3. Primary hypertension        4. Paroxysmal atrial fibrillation (HCC)        5. Atherosclerosis of native coronary artery of native heart without angina pectoris        6. Atypical atrial flutter (HCC)        7. Right-sided lacunar infarction (HCC)        8. Obesity (BMI 30-39.9)            PLAN:  I reviewed results of prior studies and physiologic data with the patient.   I discussed treatment options with risks and benefits.  Treatment with  PAP is medically necessary and Josep is agreable to continue use.   Care of equipment, methods to improve comfort using PAP and importance of compliance with therapy were " "discussed.  Pressure setting: continue 10 cmH2O using a fullface mask.    Rx provided to replace supplies and Care coordinated with DME provider.   Discussed strategies for weight reduction.    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     Thank you for allowing me to participate in the care of this patient.    Sincerely,     Authenticated electronically on 09/18/24   Board Certified Specialist     Portions of the record may have been created with voice recognition software. Occasional wrong word or \"sound a like\" substitutions may have occurred due to the inherent limitations of voice recognition software. There may also be notations and random deletions of words or characters from malfunctioning software. Read the chart carefully and recognize, using context, where substitutions/deletions have occurred.    "

## 2024-09-19 ENCOUNTER — TELEPHONE (OUTPATIENT)
Dept: SLEEP CENTER | Facility: CLINIC | Age: 69
End: 2024-09-19

## 2024-11-11 ENCOUNTER — RA CDI HCC (OUTPATIENT)
Dept: OTHER | Facility: HOSPITAL | Age: 69
End: 2024-11-11

## 2024-11-14 RX ORDER — ONDANSETRON 4 MG/1
4 TABLET, FILM COATED ORAL EVERY 12 HOURS PRN
COMMUNITY
Start: 2024-10-31 | End: 2024-11-15

## 2024-11-14 RX ORDER — MECLIZINE HCL 12.5 MG 12.5 MG/1
12.5 TABLET ORAL EVERY 8 HOURS PRN
COMMUNITY
Start: 2024-10-31 | End: 2024-11-20

## 2024-11-18 ENCOUNTER — OFFICE VISIT (OUTPATIENT)
Dept: INTERNAL MEDICINE CLINIC | Facility: CLINIC | Age: 69
End: 2024-11-18
Payer: COMMERCIAL

## 2024-11-18 VITALS
DIASTOLIC BLOOD PRESSURE: 80 MMHG | SYSTOLIC BLOOD PRESSURE: 126 MMHG | HEART RATE: 64 BPM | WEIGHT: 258 LBS | OXYGEN SATURATION: 97 % | HEIGHT: 65 IN | BODY MASS INDEX: 42.99 KG/M2

## 2024-11-18 DIAGNOSIS — Z12.5 SCREENING PSA (PROSTATE SPECIFIC ANTIGEN): ICD-10-CM

## 2024-11-18 DIAGNOSIS — E66.01 CLASS 3 SEVERE OBESITY DUE TO EXCESS CALORIES WITH SERIOUS COMORBIDITY AND BODY MASS INDEX (BMI) OF 40.0 TO 44.9 IN ADULT (HCC): ICD-10-CM

## 2024-11-18 DIAGNOSIS — Z13.6 SCREENING FOR CARDIOVASCULAR CONDITION: ICD-10-CM

## 2024-11-18 DIAGNOSIS — E66.813 CLASS 3 SEVERE OBESITY DUE TO EXCESS CALORIES WITH SERIOUS COMORBIDITY AND BODY MASS INDEX (BMI) OF 40.0 TO 44.9 IN ADULT (HCC): ICD-10-CM

## 2024-11-18 DIAGNOSIS — R73.03 PREDIABETES: ICD-10-CM

## 2024-11-18 DIAGNOSIS — I51.89 DIASTOLIC DYSFUNCTION: Primary | ICD-10-CM

## 2024-11-18 DIAGNOSIS — E78.49 OTHER HYPERLIPIDEMIA: ICD-10-CM

## 2024-11-18 DIAGNOSIS — R42 VERTIGO: ICD-10-CM

## 2024-11-18 DIAGNOSIS — I10 PRIMARY HYPERTENSION: ICD-10-CM

## 2024-11-18 PROCEDURE — G2211 COMPLEX E/M VISIT ADD ON: HCPCS | Performed by: INTERNAL MEDICINE

## 2024-11-18 PROCEDURE — 99214 OFFICE O/P EST MOD 30 MIN: CPT | Performed by: INTERNAL MEDICINE

## 2024-11-18 NOTE — ASSESSMENT & PLAN NOTE
Hyperlipidemia controlled continue with current medical regiment recommend a low-cholesterol diet and recommend routine exercise we will continue to monitor the progress.  Continue Setia 10 mg once daily fish oil and pravulent patient is statin intolerant

## 2024-11-18 NOTE — ASSESSMENT & PLAN NOTE
Pre diabetes -I have counseled the patient to follow a healthy balanced diet, I have counseled patient reduce carbohydrates and sweets in the diet, I would like the patient exercise routinely.  I will be checking hemoglobin A1c and comprehensive metabolic panel.  Have counseled patient about the prevention of diabetes, and the risk of progression to type 2 diabetes.  Orders:    Hemoglobin A1C; Future    Comprehensive metabolic panel; Future

## 2024-11-18 NOTE — PROGRESS NOTES
Name: Josep Blackmon      : 1955      MRN: [id number]  Encounter Provider: Roman Champion DO  Encounter Date: 2024   Encounter department: [facility]  :  Assessment & Plan  Diastolic dysfunction  Worsening diastolic dysfunction grade 3 I would like him to see his cardiologist order provided I did review the echocardiogram in detail  Orders:    Ambulatory Referral to Cardiology; Future    Prediabetes  Pre diabetes -I have counseled the patient to follow a healthy balanced diet, I have counseled patient reduce carbohydrates and sweets in the diet, I would like the patient exercise routinely.  I will be checking hemoglobin A1c and comprehensive metabolic panel.  Have counseled patient about the prevention of diabetes, and the risk of progression to type 2 diabetes.  Orders:    Hemoglobin A1C; Future    Comprehensive metabolic panel; Future    Screening for cardiovascular condition    Orders:    Comprehensive metabolic panel; Future    Lipid Panel with Direct LDL reflex; Future    Screening PSA (prostate specific antigen)    Orders:    PSA, Total Screen; Future    Vertigo  Suspect chronic intermittent BPV mild patient did have minimal nystagmus he has undergone a neurological workup that was unrevealing we will start vestibular therapy the patient has Rx for Antivert 12.5 mg 1 tablet every 8 hours as needed  Orders:    Ambulatory Referral to Physical Therapy; Future    Primary hypertension  Hypertension - controlled, I have counseled patient following healthy balance diet, I would like the patient reduce sodium, exercise routinely, I would like the patient continued the med current medical regiment and we will continue to monitor.       Other hyperlipidemia  Hyperlipidemia controlled continue with current medical regiment recommend a low-cholesterol diet and recommend routine exercise we will continue to monitor the progress.  Continue Setia 10 mg once daily fish oil and  pravulent patient is statin intolerant       Class 3 severe obesity due to excess calories with serious comorbidity and body mass index (BMI) of 40.0 to 44.9 in adult (HCC)  Obesity -I have counseled patient following healthy and balanced diet, I would like the patient to lose weight, I would like the patient exercise routinely; we will continue monitor the patient's progress.         Return to office  6 months  call if any problems       History of Present Illness   69 y.o. male with past medical history as mentioned above. He presented to [facility] with complaint of dizziness. In the ED, CT head was without evidence of large territorial infarct. He was admitted and seen in consultation by neurology. MRI/MRA brain was obtained and unremarkable. PT was consulted for vertigo, meclizine was initiated. Orthostatics were normal. Symptoms improved. He will be d/c'd home today in a stable condition with a prescription for Meclizine and Zofran PRN and a referral for outpatient vestibular therapy. Patient agrees and understands all d/c instructions. He will f/u with his PCP within 1 week.     HPI 69-year old male coming in for a follow up visit regarding diastolic dysfunction, prediabetes, vertigo mild intermittent, primary hypertension, hyperlipidemia and class III severe obesity; the patient reports me compliant taking medications without untoward side effects the.  The patient is here to review his medical condition, update me on the medical condition and the patient reports me 1 hospitalizations and 1 ER visits.  Patient does report to me prior to hospitalization he had developed nausea dizziness he was subsequently admitted into [facility] patient did undergo MRI CT of the head which was unrevealing has had some symptom improvement since discharge only minimal symptoms at this point he does not report to me the symptoms occur with turning the head he reports me these symptoms have been chronic  "throughout the years very minimal and they come and go.  Commend nausea , dizzy went to hospital patient does report to me he was prescribed Zofran 4 mg 1 tablet every 12 hours for nausea and vomiting along with Antivert 12.5 mg 1 tablet every 8 hours as needed for dizziness the patient did have MRI which was negative for stroke the patient's symptoms had improved initially he had a little bit of symptoms but he has run out of the medication.  Review of Systems   Constitutional:  Negative for activity change, appetite change and unexpected weight change.   HENT:  Negative for congestion and postnasal drip.    Eyes:  Negative for visual disturbance.   Respiratory:  Negative for cough and shortness of breath.    Cardiovascular:  Negative for chest pain.   Gastrointestinal:  Negative for abdominal pain, diarrhea, nausea and vomiting.   Neurological:  Negative for dizziness, light-headedness and headaches.   Hematological:  Negative for adenopathy.   Vertigo  Social History     Tobacco Use    Smoking status: Never    Smokeless tobacco: Never   Vaping Use    Vaping status: Never Used   Substance and Sexual Activity    Alcohol use: Never    Drug use: No    Sexual activity: Yes        Objective   /80   Pulse 64   Ht 5' 5\" (1.651 m)   Wt 117 kg (258 lb)   SpO2 97%   BMI 42.93 kg/m²      Physical Exam  Vitals and nursing note reviewed.   Constitutional:       Appearance: He is well-developed.   HENT:      Head: Normocephalic and atraumatic.      Right Ear: External ear normal.      Left Ear: External ear normal.      Nose: Nose normal.   Eyes:      Pupils: Pupils are equal, round, and reactive to light.   Cardiovascular:      Rate and Rhythm: Normal rate and regular rhythm.      Heart sounds: Normal heart sounds. No murmur heard.  Pulmonary:      Effort: Pulmonary effort is normal.      Breath sounds: Normal breath sounds.   Abdominal:      General: There is no distension.      Palpations: Abdomen is soft.      " Tenderness: There is no abdominal tenderness. There is no guarding.     Negative Hallpike

## 2024-11-23 DIAGNOSIS — I10 ESSENTIAL HYPERTENSION: ICD-10-CM

## 2024-11-24 RX ORDER — VALSARTAN 320 MG/1
320 TABLET ORAL DAILY
Qty: 90 TABLET | Refills: 1 | Status: SHIPPED | OUTPATIENT
Start: 2024-11-24

## 2024-11-26 ENCOUNTER — TELEPHONE (OUTPATIENT)
Dept: NEUROLOGY | Facility: CLINIC | Age: 69
End: 2024-11-26

## 2024-11-26 NOTE — TELEPHONE ENCOUNTER
Patient is due for a follow up with Dr Daugherty in April 2025. Called patient from wait list to offer appointment. Left vm. When patient returns the call, please schedule f/u appointment.

## 2024-11-27 ENCOUNTER — RESULTS FOLLOW-UP (OUTPATIENT)
Dept: INTERNAL MEDICINE CLINIC | Facility: CLINIC | Age: 69
End: 2024-11-27

## 2024-11-27 ENCOUNTER — APPOINTMENT (OUTPATIENT)
Dept: LAB | Age: 69
End: 2024-11-27
Payer: COMMERCIAL

## 2024-11-27 DIAGNOSIS — Z12.5 SCREENING PSA (PROSTATE SPECIFIC ANTIGEN): ICD-10-CM

## 2024-11-27 DIAGNOSIS — Z13.6 SCREENING FOR CARDIOVASCULAR CONDITION: ICD-10-CM

## 2024-11-27 DIAGNOSIS — E78.49 OTHER HYPERLIPIDEMIA: ICD-10-CM

## 2024-11-27 DIAGNOSIS — R73.03 PREDIABETES: ICD-10-CM

## 2024-11-27 LAB
ALBUMIN SERPL BCG-MCNC: 3.9 G/DL (ref 3.5–5)
ALP SERPL-CCNC: 78 U/L (ref 34–104)
ALT SERPL W P-5'-P-CCNC: 27 U/L (ref 7–52)
ANION GAP SERPL CALCULATED.3IONS-SCNC: 8 MMOL/L (ref 4–13)
AST SERPL W P-5'-P-CCNC: 22 U/L (ref 13–39)
BILIRUB SERPL-MCNC: 0.41 MG/DL (ref 0.2–1)
BUN SERPL-MCNC: 13 MG/DL (ref 5–25)
CALCIUM SERPL-MCNC: 10.2 MG/DL (ref 8.4–10.2)
CHLORIDE SERPL-SCNC: 104 MMOL/L (ref 96–108)
CHOLEST SERPL-MCNC: 97 MG/DL (ref ?–200)
CO2 SERPL-SCNC: 30 MMOL/L (ref 21–32)
CREAT SERPL-MCNC: 0.64 MG/DL (ref 0.6–1.3)
EST. AVERAGE GLUCOSE BLD GHB EST-MCNC: 120 MG/DL
GFR SERPL CREATININE-BSD FRML MDRD: 99 ML/MIN/1.73SQ M
GLUCOSE P FAST SERPL-MCNC: 99 MG/DL (ref 65–99)
HBA1C MFR BLD: 5.8 %
HDLC SERPL-MCNC: 34 MG/DL
LDLC SERPL CALC-MCNC: 6 MG/DL (ref 0–100)
POTASSIUM SERPL-SCNC: 4.4 MMOL/L (ref 3.5–5.3)
PROT SERPL-MCNC: 7.2 G/DL (ref 6.4–8.4)
PSA SERPL-MCNC: 1.38 NG/ML (ref 0–4)
SODIUM SERPL-SCNC: 142 MMOL/L (ref 135–147)
TRIGL SERPL-MCNC: 283 MG/DL (ref ?–150)

## 2024-11-27 PROCEDURE — 36415 COLL VENOUS BLD VENIPUNCTURE: CPT

## 2024-11-27 PROCEDURE — 83036 HEMOGLOBIN GLYCOSYLATED A1C: CPT

## 2024-11-27 PROCEDURE — 80053 COMPREHEN METABOLIC PANEL: CPT

## 2024-11-27 PROCEDURE — G0103 PSA SCREENING: HCPCS

## 2024-11-27 PROCEDURE — 80061 LIPID PANEL: CPT

## 2024-11-29 NOTE — TELEPHONE ENCOUNTER
Left detailed VM of providers message. Advised to call back with any questions.         ----- Message from Roman Champion DO sent at 11/27/2024  4:27 PM EST -----  Notify the patient elevation of the hemoglobin A1c in the prediabetic range please have patient reduce carbohydrates and sweets PSA stable 1.3 please have the pt follow up with me to discuss as scheduled

## 2024-12-03 ENCOUNTER — EVALUATION (OUTPATIENT)
Dept: PHYSICAL THERAPY | Age: 69
End: 2024-12-03
Payer: COMMERCIAL

## 2024-12-03 VITALS — DIASTOLIC BLOOD PRESSURE: 83 MMHG | SYSTOLIC BLOOD PRESSURE: 162 MMHG | HEART RATE: 66 BPM

## 2024-12-03 DIAGNOSIS — R26.9 ABNORMALITY OF GAIT: Primary | ICD-10-CM

## 2024-12-03 DIAGNOSIS — R42 VERTIGO: ICD-10-CM

## 2024-12-03 PROCEDURE — 97110 THERAPEUTIC EXERCISES: CPT

## 2024-12-03 PROCEDURE — 97162 PT EVAL MOD COMPLEX 30 MIN: CPT

## 2024-12-04 NOTE — PROGRESS NOTES
PT Evaluation     Today's date: 12/3/2024  Patient name: Josep Blackmon  : 1955  MRN: [id number]  Referring provider: Roman Champion DO  Dx:   Encounter Diagnosis     ICD-10-CM    1. Abnormality of gait  R26.9       2. Vertigo  R42 Ambulatory Referral to Physical Therapy                     Assessment/Plan    Subjective Evaluation    History of Present Illness  Onset date: 3 weeks ago onset of vertigo waking up and rolling in bed f/b imbalance, vertigo, unstable gait 2 day hospital stay.          Recurrent probem    Quality of life: good    Patient Goals  Patient goals for therapy: decreased pain and improved balance  Patient goal: eliminate vertigo, improve balance and unstable gait , self management of vertigo  Pain  Current pain ratin  At best pain ratin  At worst pain ratin  Location: cervical stiffness with cervical sidebend with rotation maneuver  Quality: dull ache, grinding, tight and pulling  Relieving factors: change in position and rest  Aggravating factors: overhead activity (quick head turns)  Progression: improved    Social Support  Steps to enter house: yes (2 steps norailing)  Stairs in house: yes (2 flights 2 story home and basement with railing)   Lives in: multiple-level home  Lives with: spouse    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    Treatments  Current treatment: physical therapy    Objective    Flowsheet Rows      Flowsheet Row Most Recent Value   PT/OT G-Codes    Current Score 67   Projected Score 65   Assessment Type Evaluation   G code set Mobility: Walking & Moving Around   Mobility: Walking and Moving Around Current Status () CJ   Mobility: Walking and Moving Around Goal Status () CI               Precautions: Allergies  Reviewed by You at 11:31 PM   Severity  Reactions Comments   Iodine - Food Allergy High Shortness Of Breath Other reaction(s): Respiratory Distress Other reaction(s): Respiratory Distress   Omnipaque [iohexol] High Anaphylaxis    Erythromycin Not Specified Hives Other reaction(s): Other (See Comments) Pleurisy Pleurisy Other reaction(s): Other (See Comments) Pleurisy   Iodixanol Not Specified     Lisinopril Not Specified Cough    Statins Not Specified Myalgia          Manuals 12/3/24            [employer] balance testing and training I eval             Phillip hallpike  Left slight light headedness            Left rolling   and retest  + 2 beat nystagmus , right negative            Bar b que roll left              Neuro Re-Ed             Chin tucks   5 reps 5 sec hold            Tandem stance 30 sec x 3            Tandem gait  1 min x 1 left ankle pain today             Ankle sway referencing flat and foam eo, ec to tolerance             Foam vertical and horizontal head turns              Squats look thru blinds             Ball pass activities  flex /ext rot, ccw and cw circles              Ther Ex             Walk and toss ball to self , walk and toss side to side with PT              [address] r/l ball toss                                                                                           Ther Activity                                       Gait Training                                       Modalities                                             probem    Quality of life: good    Patient Goals  Patient goals for therapy: decreased pain and improved balance  Patient goal: eliminate vertigo, improve balance and unstable gait , self management of vertigo  Pain  Current pain ratin  At best pain ratin  At worst pain ratin  Location: cervical stiffness with cervical sidebend with rotation maneuver  Quality: dull ache, grinding, tight and pulling  Relieving factors: change in position and rest  Aggravating factors: overhead activity (quick head turns)  Progression: improved    Social Support  Steps to enter house: yes (2 steps norailing)  Stairs in house: yes (2 flights 2 story home and basement with railing)   Lives in: multiple-level home  Lives with: spouse    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    Treatments  Current treatment: chiropractic and physical therapy  Current treatment comments: prn.   Discharged from (in last 30 days): inpatient hospitalization    Objective     Active Range of Motion   Cervical/Thoracic Spine       Cervical  Subcranial protraction:  WFL   Subcranial retraction: Active cervical subcranial retraction: 1/2 range.   Flexion: Neck active flexion: 2cm chin to sternal notch.   Extension: Neck active extension: 16 cm chin to sternal notch.      Left lateral flexion: Neck active lateral bend left: 1/2 range.      Right lateral flexion: Neck active lateral bend right: 1/2 range.      Left rotation: Neck active rotation left: 15cm chin to sternal notch.  Right rotation: Neck active rotation right: 14cm chin to sternal notch.       Ambulation     Ambulation: Level Surfaces   Ambulation without assistive device: independent    Additional Level Surfaces Ambulation Details  75 ft level surface + lateral trunk bending ,  decrease hip and knee flexion with gait,    TUG 12 seconds  Functional gait assessment 22/30  Tandem gait c of 1 10 ft tandem stance 15 seconds,   ( pt reported his left ankle popped two hours ago now left ankle pain - reports this happens to him periodically)    HTT-, , vor and vor can neg,  visual testing smooth pursuit, saccades , convergence intact    Supine neg, left rolling 2 beat nystagmus, left sidelying to sit slight vertigo, sit to sidelying neg,  right sidelying to sit and sit to sidelying neg,     Static standing eyes open flat surface wfl's, eyes closed slight ant post sway ,  foam standing eyes open slight ant post sway, eyes closed moderate ant post sway noted.  Foam standing with head turns vertical head turns eyes closed excessive ant /post sway no falls    Pt given meclazine for prn usage by his doctors in the hospital and for later.   Hx of cataracts present in both eyes           Flowsheet Rows      Flowsheet Row Most Recent Value   PT/OT G-Codes    Current Score 67   Projected Score 65   Assessment Type Evaluation   G code set Mobility: Walking & Moving Around   Mobility: Walking and Moving Around Current Status () CJ   Mobility: Walking and Moving Around Goal Status () CI        PMH: HTN, aortic valve disorder, atherosclerosis of native coronary artery of native heart, thoracic aortic aneurysm, venous insufficiency both le's, paroxysmal a fib, atypical atrial flutter, non rheumatic aortic valve insufficiency, stable angina, asthma, shikha has cpap, GERD, right sided lacunar infarction, folliculitis, horseshoe kidney, gait abnormality, arthralgia of left knee, left TKR 7/28/16, acute right sided low back pain, hx of CVA, TIA 30 yrs ago, memory change, syncopal episodes 1 yr ago, hx PE, prediabetes, carotid bruit, hernia repair, urethral surgery, hypokalemia, bradycardia, covid 19 infection, hx of vertigo reported  30 years and 15 years ago and most recent episode 3 weeks ago , pt wears  compression knee highs        Precautions: Allergies   Cardiac   Reviewed by You at 11:31 PM   Severity Reactions Comments   Iodine - Food Allergy High Shortness Of Breath Other reaction(s): Respiratory Distress Other reaction(s): Respiratory Distress   Omnipaque [iohexol] High Anaphylaxis    Erythromycin Not Specified Hives Other reaction(s): Other (See Comments) Pleurisy Pleurisy Other reaction(s): Other (See Comments) Pleurisy   Iodixanol Not Specified     Lisinopril Not Specified Cough    Statins Not Specified Myalgia          Manuals 12/3/24            [employer] balance testing and training I yajaira Fisher hallpike  Left slight light headedness            Left rolling   and retest  + 2 beat nystagmus , right negative            Bar b que roll left              Neuro Re-Ed             Chin tucks   5 reps 5 sec hold            Tandem stance 30 sec x 3            Tandem gait  1 min x 1 left ankle pain today             Ankle sway referencing flat and foam eo, ec to tolerance             Foam vertical and horizontal head turns              Squats look thru blinds             Ball pass activities  flex /ext rot, ccw and cw circles              Ther Ex             Walk and toss ball to self , walk and toss side to side with PT              [address] r/l ball toss                                                                                           Ther Activity                                       Gait Training                                       Modalities

## 2024-12-06 ENCOUNTER — OFFICE VISIT (OUTPATIENT)
Dept: PHYSICAL THERAPY | Age: 69
End: 2024-12-06
Payer: COMMERCIAL

## 2024-12-06 DIAGNOSIS — R26.9 ABNORMALITY OF GAIT: ICD-10-CM

## 2024-12-06 DIAGNOSIS — R42 VERTIGO: Primary | ICD-10-CM

## 2024-12-06 PROCEDURE — 97112 NEUROMUSCULAR REEDUCATION: CPT

## 2024-12-06 PROCEDURE — 97530 THERAPEUTIC ACTIVITIES: CPT

## 2024-12-06 PROCEDURE — 97110 THERAPEUTIC EXERCISES: CPT

## 2024-12-06 NOTE — PROGRESS NOTES
"Daily Note     Today's date: 2024  Patient name: Josep Blackmon  : 1955  MRN: [id number]  Referring provider: Roman Champion DO  Dx:   Encounter Diagnosis     ICD-10-CM    1. Vertigo  R42       2. Abnormality of gait  R26.9                      Subjective: \"I am feeling better today.\"      Objective: See treatment diary below      Assessment: Tolerated treatment well . Written hand outs issued today sway referencing added.       Plan: Continue per plan of care.      Precautions: Allergies  Reviewed by You at 11:31 PM   Severity Reactions Comments   Iodine - Food Allergy High Shortness Of Breath Other reaction(s): Respiratory Distress Other reaction(s): Respiratory Distress   Omnipaque [iohexol] High Anaphylaxis    Erythromycin Not Specified Hives Other reaction(s): Other (See Comments) Pleurisy Pleurisy Other reaction(s): Other (See Comments) Pleurisy   Iodixanol Not Specified     Lisinopril Not Specified Cough    Statins Not Specified Myalgia          Manuals 12/3/24 12/6           [employer] balance testing and training I eval             Phillip hallpike  Left slight light headedness neg           Left rolling   and retest  + 2 beat nystagmus , right negative neg           Bar b que roll left              Neuro Re-Ed             Chin tucks   5 reps 5 sec hold 5           Tandem stance 30 sec x 3 30 sec x 3           Tandem gait  1 min x 1 left ankle pain today  1 min            Ankle sway referencing flat and foam eo, ec to tolerance  10 reps            Foam vertical and horizontal head turns   10 reps            Squats look thru blinds  3 x 10           Ball pass activities  flex /ext rot, ccw and cw circles   10 reps            Ther Ex             Walk and toss ball to self , walk and toss side to side with PT   40 ft x 2           [address] r/l ball toss                                                                                           Ther Activity                                       Gait " Training                                       Modalities

## 2024-12-09 ENCOUNTER — OFFICE VISIT (OUTPATIENT)
Dept: PHYSICAL THERAPY | Age: 69
End: 2024-12-09
Payer: COMMERCIAL

## 2024-12-09 DIAGNOSIS — R42 VERTIGO: Primary | ICD-10-CM

## 2024-12-09 DIAGNOSIS — R26.9 ABNORMALITY OF GAIT: ICD-10-CM

## 2024-12-09 PROCEDURE — 97112 NEUROMUSCULAR REEDUCATION: CPT

## 2024-12-09 PROCEDURE — 97110 THERAPEUTIC EXERCISES: CPT

## 2024-12-09 PROCEDURE — 97530 THERAPEUTIC ACTIVITIES: CPT

## 2024-12-09 NOTE — PROGRESS NOTES
"Daily Note     Today's date: 2024  Patient name: Josep Blackmon  : 1955  MRN: [id number]  Referring provider: Roman Champion DO  Dx:   Encounter Diagnosis     ICD-10-CM    1. Vertigo  R42       2. Abnormality of gait  R26.9           Start Time: 1555  Stop Time: 1650  Total time in clinic (min): 55 minutes    Subjective: Patient reports that he has continued LBP but denies any complaints of dizziness.       Objective: See treatment diary below      Assessment: No changes to program this session. One incidence of LOB with passing ball side to side with PTA. Dizziness passed with seated rest break. Patient tolerated treatment well. Patient demonstrated fatigue post treatment, exhibited good technique with therapeutic exercises, and would benefit from continued PT      Plan: Continue per plan of care.  Progress treatment as tolerated.       Precautions: Allergies     Cardiac hx  Reviewed by You at 11:31 PM   Severity Reactions Comments   Iodine - Food Allergy High Shortness Of Breath Other reaction(s): Respiratory Distress Other reaction(s): Respiratory Distress   Omnipaque [iohexol] High Anaphylaxis    Erythromycin Not Specified Hives Other reaction(s): Other (See Comments) Pleurisy Pleurisy Other reaction(s): Other (See Comments) Pleurisy   Iodixanol Not Specified     Lisinopril Not Specified Cough    Statins Not Specified Myalgia        Check vitals, watch for sob with activity and provide rests   Manuals 12/3/24 12/6 12/9          [employer] balance testing and training I eval             Phillip hallpike  Left slight light headedness neg           Left rolling   and retest  + 2 beat nystagmus , right negative neg           Bar b que roll left              Neuro Re-Ed             Chin tucks   5 reps 5 sec hold 5 5\"x5          Tandem stance 30 sec x 3 30 sec x 3 30\"x3 ea          Tandem gait  1 min x 1 left ankle pain today  1 min  1 min          Ankle sway referencing flat and foam eo, ec to tolerance  " 10 reps  10x ea          Foam vertical and horizontal head turns   10 reps  10x ea          Squats look thru blinds  3 x 10 3x10          Ball pass activities  flex /ext rot, ccw and cw circles   10 reps  10x ea          Ther Ex             Walk and toss ball to self , walk and toss side to side with PT   40 ft x 2 40' x2 ea          [address] r/l ball toss   NV                                                                                        Ther Activity                                       Gait Training                                       Modalities

## 2024-12-10 ENCOUNTER — APPOINTMENT (OUTPATIENT)
Dept: LAB | Age: 69
End: 2024-12-10
Payer: COMMERCIAL

## 2024-12-10 DIAGNOSIS — E78.5 HYPERLIPIDEMIA, UNSPECIFIED HYPERLIPIDEMIA TYPE: ICD-10-CM

## 2024-12-10 DIAGNOSIS — E78.9 DISORDER OF LIPOPROTEIN AND LIPID METABOLISM: ICD-10-CM

## 2024-12-10 DIAGNOSIS — Z79.899 NEED FOR PROPHYLACTIC CHEMOTHERAPY: ICD-10-CM

## 2024-12-10 PROCEDURE — 83721 ASSAY OF BLOOD LIPOPROTEIN: CPT

## 2024-12-10 PROCEDURE — 36415 COLL VENOUS BLD VENIPUNCTURE: CPT

## 2024-12-11 ENCOUNTER — OFFICE VISIT (OUTPATIENT)
Dept: PHYSICAL THERAPY | Age: 69
End: 2024-12-11
Payer: COMMERCIAL

## 2024-12-11 DIAGNOSIS — R42 VERTIGO: ICD-10-CM

## 2024-12-11 DIAGNOSIS — R26.9 ABNORMALITY OF GAIT: Primary | ICD-10-CM

## 2024-12-11 LAB — LDLC SERPL DIRECT ASSAY-MCNC: 64 MG/DL (ref 0–100)

## 2024-12-11 PROCEDURE — 97110 THERAPEUTIC EXERCISES: CPT

## 2024-12-11 PROCEDURE — 97530 THERAPEUTIC ACTIVITIES: CPT

## 2024-12-11 PROCEDURE — 97140 MANUAL THERAPY 1/> REGIONS: CPT

## 2024-12-11 PROCEDURE — 97112 NEUROMUSCULAR REEDUCATION: CPT

## 2024-12-12 NOTE — PROGRESS NOTES
"Daily Note     Today's date: 2024  Patient name: Josep Blackmon  : 1955  MRN: [id number]  Referring provider: Roman Champion DO  Dx:   Encounter Diagnosis     ICD-10-CM    1. Abnormality of gait  R26.9       2. Vertigo  R42                      Subjective: Pt reporting no vertigo today.       Objective: See treatment diary below      Assessment: Tolerated treatment well. Patient would benefit from continued PT      Plan: Continue per plan of care. Potential one final session then d/c of PT pending continued progress.     Precautions: Allergies     Cardiac hx  Reviewed by You at 11:31 PM   Severity Reactions Comments   Iodine - Food Allergy High Shortness Of Breath Other reaction(s): Respiratory Distress Other reaction(s): Respiratory Distress   Omnipaque [iohexol] High Anaphylaxis    Erythromycin Not Specified Hives Other reaction(s): Other (See Comments) Pleurisy Pleurisy Other reaction(s): Other (See Comments) Pleurisy   Iodixanol Not Specified     Lisinopril Not Specified Cough    Statins Not Specified Myalgia        Check vitals, watch for sob with activity and provide rests   Manuals 12/3/24 12/6 12/9 12/11         [employer] balance testing and training I eval             Phillip hallpike  Left slight light headedness neg  neg         Left rolling   and retest  + 2 beat nystagmus , right negative neg  neg         Bar b que roll left              Neuro Re-Ed             Chin tucks   5 reps 5 sec hold 5 5\"x5 5         Tandem stance 30 sec x 3 30 sec x 3 30\"x3 ea 30 sec x 3         Tandem gait  1 min x 1 left ankle pain today  1 min  1 min 1 min x1         Ankle sway referencing flat and foam eo, ec to tolerance  10 reps  10x ea 10 reps          Foam vertical and horizontal head turns   10 reps  10x ea 10         Squats look thru blinds  3 x 10 3x10 3 x 10         Ball pass activities  flex /ext rot, ccw and cw circles   10 reps  10x ea 10         Ther Ex             Walk and toss ball to self , walk " and toss side to side with PT   40 ft x 2 40' x2 ea 40 ft x 2         [address] r/l ball toss    ft x 2         Sidestepping ball toss in front    40 ft x 2                                                                          Ther Activity                                       Gait Training                                       Modalities

## 2024-12-16 ENCOUNTER — OFFICE VISIT (OUTPATIENT)
Dept: PHYSICAL THERAPY | Age: 69
End: 2024-12-16
Payer: COMMERCIAL

## 2024-12-16 DIAGNOSIS — R42 VERTIGO: ICD-10-CM

## 2024-12-16 DIAGNOSIS — R26.9 ABNORMALITY OF GAIT: Primary | ICD-10-CM

## 2024-12-16 PROCEDURE — 97110 THERAPEUTIC EXERCISES: CPT

## 2024-12-16 PROCEDURE — 97112 NEUROMUSCULAR REEDUCATION: CPT

## 2024-12-16 PROCEDURE — 97140 MANUAL THERAPY 1/> REGIONS: CPT

## 2024-12-16 PROCEDURE — 97530 THERAPEUTIC ACTIVITIES: CPT

## 2024-12-16 NOTE — PROGRESS NOTES
"Daily Note     Today's date: 2024  Patient name: Josep Blackmon  : 1955  MRN: [id number]  Referring provider: Roman Champion DO  Dx:   Encounter Diagnosis     ICD-10-CM    1. Abnormality of gait  R26.9       2. Vertigo  R42                      Subjective: pt denies any vertigo at this time.       Objective: See treatment diary below      Assessment: Tolerated treatment well. Patient to continue with home exer program.     Plan: d/c of PT   set goals achieved.     Precautions: Allergies     Cardiac hx  Reviewed by You at 11:31 PM   Severity Reactions Comments   Iodine - Food Allergy High Shortness Of Breath Other reaction(s): Respiratory Distress Other reaction(s): Respiratory Distress   Omnipaque [iohexol] High Anaphylaxis    Erythromycin Not Specified Hives Other reaction(s): Other (See Comments) Pleurisy Pleurisy Other reaction(s): Other (See Comments) Pleurisy   Iodixanol Not Specified     Lisinopril Not Specified Cough    Statins Not Specified Myalgia        Check vitals, watch for sob with activity and provide rests   Manuals 12/3/24 12/6 12/9 12/11 12/16        [employer] balance testing and training I eval             Phillip hallpike  Left slight light headedness neg  neg neg        Left rolling   and retest  + 2 beat nystagmus , right negative neg  neg neg        Bar b que roll left              Neuro Re-Ed             Chin tucks   5 reps 5 sec hold 5 5\"x5 5 5        Tandem stance 30 sec x 3 30 sec x 3 30\"x3 ea 30 sec x 3 30 sec x 3        Tandem gait  1 min x 1 left ankle pain today  1 min  1 min 1 min x1 1minx 1        Ankle sway referencing flat and foam eo, ec to tolerance  10 reps  10x ea 10 reps  10 reps         Foam vertical and horizontal head turns   10 reps  10x ea 10 10 reps         Squats look thru blinds  3 x 10 3x10 3 x 10 3 x 10        Ball pass activities  flex /ext rot, ccw and cw circles   10 reps  10x ea 10 10 reps         Ther Ex             Walk and toss ball to self , " walk and toss side to side with PT   40 ft x 2 40' x2 ea 40 ft x 2 40 ft x 2        [address] r/l ball toss    ft x 2 100 ft x 2        Sidestepping ball toss in front    40 ft x 2 40 ft x 2        Head diagonals      5 reps 10 sec hold each                                                            Ther Activity                                       Gait Training                                       Modalities

## 2024-12-17 DIAGNOSIS — I10 PRIMARY HYPERTENSION: ICD-10-CM

## 2024-12-18 ENCOUNTER — APPOINTMENT (OUTPATIENT)
Dept: PHYSICAL THERAPY | Age: 69
End: 2024-12-18
Payer: COMMERCIAL

## 2024-12-18 DIAGNOSIS — K22.710 BARRETT'S ESOPHAGUS WITH LOW GRADE DYSPLASIA: ICD-10-CM

## 2024-12-18 RX ORDER — PANTOPRAZOLE SODIUM 20 MG/1
20 TABLET, DELAYED RELEASE ORAL DAILY
Qty: 90 TABLET | Refills: 1 | Status: SHIPPED | OUTPATIENT
Start: 2024-12-18

## 2024-12-18 RX ORDER — AMLODIPINE BESYLATE 10 MG/1
10 TABLET ORAL DAILY
Qty: 90 TABLET | Refills: 1 | Status: SHIPPED | OUTPATIENT
Start: 2024-12-18

## 2024-12-18 NOTE — TELEPHONE ENCOUNTER
I called pt to make a f/u appt for future medications refills, left voicemail and requested call back.

## 2024-12-19 ENCOUNTER — VBI (OUTPATIENT)
Dept: ADMINISTRATIVE | Facility: OTHER | Age: 69
End: 2024-12-19

## 2024-12-19 NOTE — TELEPHONE ENCOUNTER
12/19/24 3:23 PM     Chart reviewed for Diabetic Eye Exam ; nothing is submitted to the patient's insurance at this time.     Magen Willoughby MA   [facility]

## 2024-12-23 ENCOUNTER — APPOINTMENT (OUTPATIENT)
Dept: PHYSICAL THERAPY | Age: 69
End: 2024-12-23
Payer: COMMERCIAL

## 2024-12-27 ENCOUNTER — APPOINTMENT (OUTPATIENT)
Dept: PHYSICAL THERAPY | Age: 69
End: 2024-12-27
Payer: COMMERCIAL

## 2025-01-21 ENCOUNTER — APPOINTMENT (OUTPATIENT)
Dept: LAB | Age: 70
End: 2025-01-21
Payer: COMMERCIAL

## 2025-01-21 DIAGNOSIS — R68.89 MECHANICAL AND MOTOR PROBLEMS WITH INTERNAL ORGANS: ICD-10-CM

## 2025-01-21 DIAGNOSIS — E66.01 MORBID OBESITY (HCC): ICD-10-CM

## 2025-01-21 LAB — TSH SERPL DL<=0.05 MIU/L-ACNC: 1.6 UIU/ML (ref 0.45–4.5)

## 2025-01-21 PROCEDURE — 84443 ASSAY THYROID STIM HORMONE: CPT

## 2025-01-21 PROCEDURE — 36415 COLL VENOUS BLD VENIPUNCTURE: CPT

## 2025-01-28 ENCOUNTER — OFFICE VISIT (OUTPATIENT)
Dept: INTERNAL MEDICINE CLINIC | Facility: CLINIC | Age: 70
End: 2025-01-28
Payer: COMMERCIAL

## 2025-01-28 VITALS
BODY MASS INDEX: 42.8 KG/M2 | DIASTOLIC BLOOD PRESSURE: 62 MMHG | HEART RATE: 59 BPM | OXYGEN SATURATION: 100 % | WEIGHT: 256.9 LBS | SYSTOLIC BLOOD PRESSURE: 118 MMHG | HEIGHT: 65 IN | TEMPERATURE: 98 F

## 2025-01-28 DIAGNOSIS — E78.49 OTHER HYPERLIPIDEMIA: ICD-10-CM

## 2025-01-28 DIAGNOSIS — J01.81 OTHER ACUTE RECURRENT SINUSITIS: ICD-10-CM

## 2025-01-28 DIAGNOSIS — J32.9 RECURRENT SINUS INFECTIONS: Primary | ICD-10-CM

## 2025-01-28 PROCEDURE — 99213 OFFICE O/P EST LOW 20 MIN: CPT

## 2025-01-28 PROCEDURE — G2211 COMPLEX E/M VISIT ADD ON: HCPCS

## 2025-01-28 RX ORDER — ALIROCUMAB 75 MG/ML
75 INJECTION, SOLUTION SUBCUTANEOUS
Qty: 1 ML | Refills: 5 | Status: SHIPPED | OUTPATIENT
Start: 2025-01-28 | End: 2025-04-28

## 2025-01-28 NOTE — ASSESSMENT & PLAN NOTE
Presents today with head cold and nasal congestion for 2 and half weeks  Has been on coricidin every 6 hourly which was helping with symptoms  Denies fever, chest congestion, SOB  Tested COVID negative at home at the beginning  Viral illness with superimposed bacterial infection in sinus  Sent script for Augmentin 875-125 for taking every 12 hours for total 5 days  Instructed if things are getting worse call us or go to the ER

## 2025-01-28 NOTE — ASSESSMENT & PLAN NOTE
Patient was on statin but did not tolerate it well  He has been tolerating praluent injection every 14 days and requiring refill  Sent script for Praluent  Discussed to maintain healthy lifestyle  Orders:    Praluent 75 MG/ML SOAJ; Inject 75 mg under the skin every 14 (fourteen) days

## 2025-01-28 NOTE — PROGRESS NOTES
Name: Josep Blackmon      : 1955      MRN: [id number]  Encounter Provider: Arvind Terrell MD  Encounter Date: 2025   Encounter department: [facility]  :  Assessment & Plan  Other hyperlipidemia  Patient was on statin but did not tolerate it well  He has been tolerating praluent injection every 14 days and requiring refill  Sent script for Praluent  Discussed to maintain healthy lifestyle  Orders:    Praluent 75 MG/ML SOAJ; Inject 75 mg under the skin every 14 (fourteen) days    Recurrent sinus infections  Presents today with head cold and nasal congestion for 2 and half weeks  Has been on coricidin every 6 hourly which was helping with symptoms  Denies fever, chest congestion, SOB  Tested COVID negative at home at the beginning  Viral illness with superimposed bacterial infection in sinus  Sent script for Augmentin 875-125 for taking every 12 hours for total 5 days  Instructed if things are getting worse call us or go to the ER               History of Present Illness   Josep Blackmon a 69 y.o. male with PMH of recurrent sinus infection, HTN, NLD, A-fib presents to day with the complaints of head cold, nasal congestion for last 2.5 weeks. Denies fever, chest congestion, SOB. Tested COVID negative at home. He has been taking Coricidin every 6 hourly with relief, also using flonase nasal spray. He has been taking praluent for hyperlipidemia as he could not tolerate other oral medications as statin. He is requesting for the refill.      Review of Systems   Constitutional:  Negative for chills and fever.   HENT:  Positive for congestion. Negative for ear pain and sore throat.    Eyes:  Negative for pain and visual disturbance.   Respiratory:  Negative for cough and shortness of breath.    Cardiovascular:  Negative for chest pain and palpitations.   Gastrointestinal:  Negative for abdominal pain and vomiting.   Genitourinary:  Negative for dysuria and hematuria.   Musculoskeletal:   "Negative for arthralgias and back pain.   Skin:  Negative for color change and rash.   Neurological:  Negative for seizures and syncope.   All other systems reviewed and are negative.      Objective   /62 (BP Location: Right arm, Patient Position: Sitting, Cuff Size: Large)   Pulse 59   Temp 98 °F (36.7 °C)   Ht 5' 5\" (1.651 m)   Wt 117 kg (256 lb 14.4 oz)   SpO2 100%   BMI 42.75 kg/m²      Physical Exam  Vitals and nursing note reviewed.   Constitutional:       General: He is not in acute distress.     Appearance: He is well-developed.   HENT:      Head: Normocephalic and atraumatic.      Nose: Congestion present.      Mouth/Throat:      Mouth: Mucous membranes are moist.      Pharynx: Posterior oropharyngeal erythema present.   Eyes:      Conjunctiva/sclera: Conjunctivae normal.   Cardiovascular:      Rate and Rhythm: Normal rate and regular rhythm.      Heart sounds: No murmur heard.  Pulmonary:      Effort: Pulmonary effort is normal. No respiratory distress.      Breath sounds: Normal breath sounds.   Abdominal:      Palpations: Abdomen is soft.      Tenderness: There is no abdominal tenderness.   Musculoskeletal:         General: No swelling.      Cervical back: Neck supple.   Skin:     General: Skin is warm and dry.      Capillary Refill: Capillary refill takes less than 2 seconds.   Neurological:      Mental Status: He is alert.   Psychiatric:         Mood and Affect: Mood normal.       Administrative Statements   I have spent a total time of 30 minutes in caring for this patient on the day of the visit/encounter including Diagnostic results, Prognosis, Risks and benefits of tx options, Instructions for management, Patient and family education, Importance of tx compliance, Risk factor reductions, Impressions, Counseling / Coordination of care, Documenting in the medical record, Reviewing / ordering tests, medicine, procedures  , Obtaining or reviewing history  , and Communicating with other " healthcare professionals .

## 2025-01-28 NOTE — PATIENT INSTRUCTIONS
Please take augmentin 1 tablet every 12 hourly for total 5 days for your sinus infection  If the symptoms not getting better please reach out to us or go to the ER

## 2025-01-29 ENCOUNTER — TELEPHONE (OUTPATIENT)
Dept: INTERNAL MEDICINE CLINIC | Facility: CLINIC | Age: 70
End: 2025-01-29

## 2025-01-29 DIAGNOSIS — E78.49 OTHER HYPERLIPIDEMIA: ICD-10-CM

## 2025-01-29 RX ORDER — EVOLOCUMAB 140 MG/ML
INJECTION, SOLUTION SUBCUTANEOUS
Refills: 0 | OUTPATIENT
Start: 2025-01-29

## 2025-01-30 RX ORDER — EVOLOCUMAB 140 MG/ML
INJECTION, SOLUTION SUBCUTANEOUS
Refills: 0 | OUTPATIENT
Start: 2025-01-30

## 2025-02-02 ENCOUNTER — TELEPHONE (OUTPATIENT)
Dept: OTHER | Facility: OTHER | Age: 70
End: 2025-02-02

## 2025-02-02 NOTE — TELEPHONE ENCOUNTER
Valentino from [facility] called reorting needing more information on Praluent. Please call once office reopens.  Case number is  [id number]

## 2025-02-03 NOTE — TELEPHONE ENCOUNTER
Spoke to Aleyda/[facility].    In review of the chart, it looks as though a PA was already started and additional information was requested. The PA team did complete this request and the information was faxed back to [facility].    I advised the representative in this call.    She did provide me with a new key # if needed.     Key # [id number]

## 2025-02-05 NOTE — TELEPHONE ENCOUNTER
Additional information form received, filled out and faxed back to [facility]  
PA for Praluent 75 mg.ml SUBMITTED to [facility]    via    []CMM-KEY:   []Surescripts-Case ID #   []Availity-Auth ID # NDC #   []Faxed to plan   []Other website   [x]Phone call Case ID # [id number]    [x]PA sent as URGENT    All office notes, labs and other pertaining documents and studies sent. Clinical questions answered. Awaiting determination from insurance company.     Turnaround time for your insurance to make a decision on your Prior Authorization can take 7-21 business days.                 
PA for Praluent 75 mg/ml  DENIED    Reason: Repatha was tried but stopped due to insurance coverage so patient did not fail repatha        Message sent to office clinical pool Yes    Denial letter scanned into Media y      Appeal started No (Provider will need to decide if appeal is warranted and send clinical documentation to Prior Authorization Team for initiation.)    **Please follow up with your patient regarding denial and next steps**    
PA for Praluent 75 mg/ml SUBMITTED to [employer]     via    []CMM-KEY:   [x]Surescripts-Case ID #   []Availity-Auth ID # NDC #   []Faxed to plan   []Other website   []Phone call Case ID #     []PA sent as URGENT    All office notes, labs and other pertaining documents and studies sent. Clinical questions answered. Awaiting determination from insurance company.     Turnaround time for your insurance to make a decision on your Prior Authorization can take 7-21 business days.                 
PA for praluent 75 mg/ml  CANCELLED due to     [x]Denial on file      []Medication already on Formulary  []Brand Name Preferred  []Patient no longer covered by insurance    Patient advised by     []My Chart Message  []Phone call    Message sent to office clinical pool   No      Scanned into Media  no    
Per phone call to [facility] regarding denial. This medication and strength has a recent denial on file from a previous prescriber.  They would not release the name of the provider to me however this medication [facility]  
Reason for call:   [] Refill   [x] Prior Auth  [] Other:     Office:   [x] PCP/Provider - Arvind Terrell  [] Specialty/Provider -     Medication: Praluent         
STEWART for patient to contact cardio.  
15-Sep-2018

## 2025-02-26 ENCOUNTER — TELEPHONE (OUTPATIENT)
Age: 70
End: 2025-02-26

## 2025-02-26 NOTE — TELEPHONE ENCOUNTER
Scheduled patient's F/U appt with [facility] in Sept added to wait list high priority.  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

## 2025-02-27 PROBLEM — J32.9 RECURRENT SINUS INFECTIONS: Status: RESOLVED | Noted: 2024-01-02 | Resolved: 2025-02-27

## 2025-02-28 NOTE — TELEPHONE ENCOUNTER
Pt calling to confirm appt on 3/24/2025 with Dr. Daugherty. Says he just wanted to make sure his appt is there.    Informed him that appt begins at 9 AM at the [facility]. Pt appreciative. Nothing further at this time.

## 2025-03-10 ENCOUNTER — RA CDI HCC (OUTPATIENT)
Dept: OTHER | Facility: HOSPITAL | Age: 70
End: 2025-03-10

## 2025-03-12 RX ORDER — EVOLOCUMAB 140 MG/ML
INJECTION, SOLUTION SUBCUTANEOUS
COMMUNITY
Start: 2025-02-20

## 2025-03-17 ENCOUNTER — OFFICE VISIT (OUTPATIENT)
Dept: INTERNAL MEDICINE CLINIC | Facility: CLINIC | Age: 70
End: 2025-03-17
Payer: COMMERCIAL

## 2025-03-17 VITALS
BODY MASS INDEX: 43.42 KG/M2 | HEART RATE: 62 BPM | OXYGEN SATURATION: 97 % | WEIGHT: 260.6 LBS | SYSTOLIC BLOOD PRESSURE: 128 MMHG | HEIGHT: 65 IN | DIASTOLIC BLOOD PRESSURE: 82 MMHG

## 2025-03-17 DIAGNOSIS — E78.49 OTHER HYPERLIPIDEMIA: ICD-10-CM

## 2025-03-17 DIAGNOSIS — Z71.89 COMPLEX CARE COORDINATION: ICD-10-CM

## 2025-03-17 DIAGNOSIS — R73.03 PREDIABETES: ICD-10-CM

## 2025-03-17 DIAGNOSIS — I10 PRIMARY HYPERTENSION: Primary | ICD-10-CM

## 2025-03-17 PROCEDURE — G2211 COMPLEX E/M VISIT ADD ON: HCPCS

## 2025-03-17 PROCEDURE — 99214 OFFICE O/P EST MOD 30 MIN: CPT

## 2025-03-17 NOTE — ASSESSMENT & PLAN NOTE
Lab Results   Component Value Date    HGBA1C 5.8 (H) 11/27/2024   Recommended fat-free diet, exercise regularly and target weight loss of at least 5 to 6 pounds in next 1 to 2 months  Orders:    Hemoglobin A1C; Future

## 2025-03-17 NOTE — ASSESSMENT & PLAN NOTE
Lab Results   Component Value Date    CHOLESTEROL 97 11/27/2024    TRIG 283 (H) 11/27/2024    HDL 34 (L) 11/27/2024    LDLCALC 6 11/27/2024     Stable on Repatha, not taking Praluent.  Managed by cardiology  Orders:    Lipid Panel with Direct LDL reflex; Future

## 2025-03-17 NOTE — ASSESSMENT & PLAN NOTE
Well-controlled.  Continue current regimen: Amlodipine 10 mg daily valsartan 320 mg daily.  Advised to exercise daily and limit salt intake  Orders:    CBC and differential; Future    Comprehensive metabolic panel; Future

## 2025-03-19 ENCOUNTER — OFFICE VISIT (OUTPATIENT)
Dept: SLEEP CENTER | Facility: CLINIC | Age: 70
End: 2025-03-19
Payer: COMMERCIAL

## 2025-03-19 VITALS
HEIGHT: 65 IN | DIASTOLIC BLOOD PRESSURE: 82 MMHG | SYSTOLIC BLOOD PRESSURE: 132 MMHG | BODY MASS INDEX: 44.08 KG/M2 | WEIGHT: 264.6 LBS

## 2025-03-19 DIAGNOSIS — G47.33 OSA (OBSTRUCTIVE SLEEP APNEA): Primary | ICD-10-CM

## 2025-03-19 DIAGNOSIS — I48.0 PAROXYSMAL ATRIAL FIBRILLATION (HCC): ICD-10-CM

## 2025-03-19 DIAGNOSIS — J45.20 MILD INTERMITTENT ASTHMA WITHOUT COMPLICATION: ICD-10-CM

## 2025-03-19 DIAGNOSIS — I63.81 RIGHT-SIDED LACUNAR INFARCTION (HCC): ICD-10-CM

## 2025-03-19 DIAGNOSIS — K21.9 GASTROESOPHAGEAL REFLUX DISEASE WITHOUT ESOPHAGITIS: Chronic | ICD-10-CM

## 2025-03-19 DIAGNOSIS — I25.10 ATHEROSCLEROSIS OF NATIVE CORONARY ARTERY OF NATIVE HEART WITHOUT ANGINA PECTORIS: ICD-10-CM

## 2025-03-19 DIAGNOSIS — E66.9 OBESITY (BMI 30-39.9): ICD-10-CM

## 2025-03-19 DIAGNOSIS — I10 PRIMARY HYPERTENSION: ICD-10-CM

## 2025-03-19 PROCEDURE — G2211 COMPLEX E/M VISIT ADD ON: HCPCS | Performed by: INTERNAL MEDICINE

## 2025-03-19 PROCEDURE — 99214 OFFICE O/P EST MOD 30 MIN: CPT | Performed by: INTERNAL MEDICINE

## 2025-03-19 NOTE — PROGRESS NOTES
standardName: Josep Blackmon      : 1955      MRN: [id number]  Encounter Provider: Daniel Garcia MD  Encounter Date: 3/19/2025   Encounter department: [facility] [place]  :  Assessment & Plan  VIRGEN (obstructive sleep apnea)    Orders:    PAP DME Resupply/Reorder    Primary hypertension         Paroxysmal atrial fibrillation (HCC)         Right-sided lacunar infarction (HCC)         Atherosclerosis of native coronary artery of native heart without angina pectoris         Mild intermittent asthma without complication         Gastroesophageal reflux disease without esophagitis/Alvares's esophagus         Obesity (BMI 30-39.9)                  PLAN:   Problems & Comorbidities Addressed this Visit as listed.  Above conditions as reviewed in notes are improved/stable/controlled.  I reviewed results of prior studies and physiologic data with the patient.   I discussed treatment options with risks and benefits.  Treatment with  PAP is medically necessary and Josep is agreable to continue use.   Care of equipment, methods to improve comfort using PAP and importance of compliance with therapy were discussed.  Pressure setting: continue 10 cmH2O. Mask type full face.    Rx provided to replace supplies and Care coordinated with DME provider.   Discussed strategies for weight reduction.    Follow-up is advised in 1 year or sooner if needed to monitor progress, compliance and to adjust therapy.      History of Present Illness   HPI          Follow-Up Note - Sleep Center   Josep Blackmon  69 y.o. male  :1955  MRN:[id number]  DOS:3/19/2025    CC: I saw this patient for follow-up in clinic today for Sleep disordered breathing, Coexisting Sleep and Medical Problems.  He is using a ResMed machine. Interval changes: None reported.      Results of a split study in : The diagnostic portion demonstrated an AHI of 16/h with minimum oxygen saturation of 90%.  During the therapeutic portion of the  study, sleep disordered breathing was adequately remediated with CPAP at 10 cm H2O.    PFSH, Problem List, Medications & Allergies were reviewed in EMR.   He  has a past medical history of Allergic rhinitis due to pollen, Aortic aneurysm ([facility]), Alvares esophagus, Coronary artery disease, CPAP (continuous positive airway pressure) dependence, Diabetes ([facility]), GERD (gastroesophageal reflux disease), History of cardiac cath, Hyperlipidemia, Hypertension, Irregular heart beat, Mild intermittent asthma without complication, Positive PPD, Pulmonary embolism ([facility]), Sleep apnea, Stroke ([facility]), Syncopal episodes, and Vertigo.    He has a current medication list which includes the following prescription(s): amlodipine, coq-10, ezetimibe, furosemide, klor-con m10, multivitamin, omega-3 fatty acids, pantoprazole, repatha sureclick, sotalol, valsartan, xarelto, aspirin, clopidogrel, fluticasone, mupirocin, and polyethylene glycol.    PHYSIOLOGICAL DATA REVIEW : Using PAP > 4 hours/night 90%. Estimated TRINITY 2/hour with pressure of 10cm H2O ;.  INTERPRETATION: Compliance is Very good; Pressure setting is:adequate; ;     SUBJECTIVE: With respect to use of PAP, Josep  is experiencing no adverse effects:.He derives benefit.. Is satisfied with sleep and daytime function.     Sleep Routine: Josep reports getting 7 hrs sleep; he has no difficulty initiating or maintaining sleep . He arises before alarm most days and feels refreshed.Josep]denies excessive daytime sleepiness,.  He rated himself at Total score: 1 /24 on the Bridger Sleepiness Scale.   Other issues: None reported.     Habits: Reports that he has never smoked. He has never used smokeless tobacco.,  Reports no history of alcohol use.,  Reports no history of drug use., Caffeine use: very little until  ; Exercise routine: regular.      ROS: Significant for few pounds weight gain.  Allergies asthma and acid reflux are all improved since he is using CPAP.  He is reporting  "no cardiac symptoms.  Review of systems was otherwise negative.    Objective   /82   Ht 5' 5\" (1.651 m)   Wt 120 kg (264 lb 9.6 oz)   BMI 44.03 kg/m²        EXAM: /82   Ht 5' 5\" (1.651 m)   Wt 120 kg (264 lb 9.6 oz)   BMI 44.03 kg/m²     Wt Readings from Last 3 Encounters:   03/19/25 120 kg (264 lb 9.6 oz)   03/17/25 118 kg (260 lb 9.6 oz)   01/28/25 117 kg (256 lb 14.4 oz)      Patient is well groomed; well appearing.   CNS: Alert, orientated, speech clear & coherent  Psych: cooperative and in no distress. Mental state: Appears normal.  H&N: EOMI; NC/AT: No facial pressure marks, no rashes.    Skin/Extrem: col & hydration normal; no edema  Resp: Respiratory effort is normal  Physical findings otherwise essentially unchanged from previous.    Sincerely,     Authenticated electronically on 03/19/25   Board Certified Specialist     Portions of the record may have been created with voice recognition software. Occasional wrong word or \"sound a like\" substitutions may have occurred due to the inherent limitations of voice recognition software. There may also be notations and random deletions of words or characters from malfunctioning software. Read the chart carefully and recognize, using context, where substitutions/deletions have occurred.       Review of Systems            "

## 2025-03-20 ENCOUNTER — TELEPHONE (OUTPATIENT)
Dept: SLEEP CENTER | Facility: CLINIC | Age: 70
End: 2025-03-20

## 2025-03-20 LAB
DME PARACHUTE DELIVERY DATE REQUESTED: NORMAL
DME PARACHUTE ORDER STATUS: NORMAL
DME PARACHUTE SUPPLIER NAME: NORMAL
DME PARACHUTE SUPPLIER PHONE: NORMAL

## 2025-03-24 ENCOUNTER — OFFICE VISIT (OUTPATIENT)
Dept: NEUROLOGY | Facility: CLINIC | Age: 70
End: 2025-03-24
Payer: COMMERCIAL

## 2025-03-24 VITALS
DIASTOLIC BLOOD PRESSURE: 70 MMHG | HEIGHT: 65 IN | BODY MASS INDEX: 44.05 KG/M2 | SYSTOLIC BLOOD PRESSURE: 132 MMHG | WEIGHT: 264.4 LBS | HEART RATE: 74 BPM

## 2025-03-24 DIAGNOSIS — Z86.73 HISTORY OF TRANSIENT ISCHEMIC ATTACK (TIA): Primary | ICD-10-CM

## 2025-03-24 DIAGNOSIS — I48.0 PAROXYSMAL ATRIAL FIBRILLATION (HCC): ICD-10-CM

## 2025-03-24 DIAGNOSIS — R42 DIZZINESS: ICD-10-CM

## 2025-03-24 PROCEDURE — 99213 OFFICE O/P EST LOW 20 MIN: CPT | Performed by: PSYCHIATRY & NEUROLOGY

## 2025-03-24 NOTE — ASSESSMENT & PLAN NOTE
Josep Blackmon is a 69 y.o. male diagnosed with TIA in 3/2017. No events since. He is now on Xarelto. He is on Zetia. He did not tolerate pravastatin. Follows with cardiology.  Form completed today in office supporting continued driving. He is aware of the signs/symptoms of stroke and when to contact 911.

## 2025-03-24 NOTE — PROGRESS NOTES
"[facility]  Follow Up Visit    Impression/Plan        Assessment & Plan  History of transient ischemic attack (TIA)  Josep Blackmon is a 69 y.o. male diagnosed with TIA in 3/2017. No events since. He is now on Xarelto. He is on Zetia. He did not tolerate pravastatin. Follows with cardiology.  Form completed today in office supporting continued driving. He is aware of the signs/symptoms of stroke and when to contact 911.        Paroxysmal atrial fibrillation (HCC)         Dizziness  Recent dizziness episode when getting up from bed on 10/29/2024 may have been presyncope and was brief. No loss of awareness or fall. He called 911 to make sure it wasn't a stroke. MRI was negative. Doesn't sound like it was vertigo, but rather lightheadedness and it hasn't recurred.             Patient Instructions   Return as needed.         Subjective    Josep Blackmon is returning to the [facility] for follow up.     Interval Events:   He was admitted to the hospital, [facility], 10/29/2024.  He was evaluated by neurology.  He presented with dizziness/lightheadedness that occurred when he got up from bed. Improved when laying down. Symptoms were resolved when EMS arrived and he walked to the stretcher. He did not lose awareness (did not slide to floor, slid to floor with distant event).  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  In the hospital MRI/MRA brain were unremarkable.  Orthostatics were normal.  He saw PT.  Symptoms are improved.  He was prescribed meclizine and Zofran.  He saw PT outpatient and had no symptoms when working with them.         Objective    /70 (BP Location: Left arm, Patient Position: Sitting, Cuff Size: Large)   Pulse 74   Ht 5' 5\" (1.651 m) "   Wt 120 kg (264 lb 6.4 oz)   BMI 44.00 kg/m²      General Exam  No acute distress.    Neurologic Exam  Mental Status:  Alert and oriented x 3.  Language: normal fluency and comprehension.  Cranial Nerves:  VFFTC. EOMI, no nystagums. Face symmetric. No dysarthria.  Motor:  No drift. Strength 5/5 throughout.  Coordination: Finger to nose intact.  Gait: Normal casual gait.         I have spent a total time of 25 minutes in caring for this patient on the day of the visit/encounter including Diagnostic results, Patient and family education, Impressions, Counseling / Coordination of care, Documenting in the medical record, Reviewing/placing orders in the medical record (including tests, medications, and/or procedures), and Obtaining or reviewing history  .

## 2025-03-26 LAB

## 2025-03-29 DIAGNOSIS — E87.6 HYPOKALEMIA: ICD-10-CM

## 2025-03-29 RX ORDER — POTASSIUM CHLORIDE 750 MG/1
20 TABLET, EXTENDED RELEASE ORAL DAILY
Qty: 180 TABLET | Refills: 1 | Status: SHIPPED | OUTPATIENT
Start: 2025-03-29

## 2025-05-12 ENCOUNTER — RA CDI HCC (OUTPATIENT)
Dept: OTHER | Facility: HOSPITAL | Age: 70
End: 2025-05-12

## 2025-06-05 DIAGNOSIS — K22.710 BARRETT'S ESOPHAGUS WITH LOW GRADE DYSPLASIA: ICD-10-CM

## 2025-06-05 RX ORDER — PANTOPRAZOLE SODIUM 20 MG/1
20 TABLET, DELAYED RELEASE ORAL DAILY
Qty: 90 TABLET | Refills: 1 | OUTPATIENT
Start: 2025-06-05

## 2025-07-29 ENCOUNTER — TELEPHONE (OUTPATIENT)
Dept: INTERNAL MEDICINE CLINIC | Facility: CLINIC | Age: 70
End: 2025-07-29

## 2025-07-29 ENCOUNTER — OFFICE VISIT (OUTPATIENT)
Dept: INTERNAL MEDICINE CLINIC | Facility: CLINIC | Age: 70
End: 2025-07-29
Payer: COMMERCIAL

## 2025-07-29 VITALS
SYSTOLIC BLOOD PRESSURE: 140 MMHG | WEIGHT: 264 LBS | HEART RATE: 66 BPM | DIASTOLIC BLOOD PRESSURE: 70 MMHG | HEIGHT: 65 IN | BODY MASS INDEX: 43.99 KG/M2 | OXYGEN SATURATION: 97 %

## 2025-07-29 DIAGNOSIS — E66.813 CLASS 3 SEVERE OBESITY DUE TO EXCESS CALORIES WITH SERIOUS COMORBIDITY AND BODY MASS INDEX (BMI) OF 40.0 TO 44.9 IN ADULT: ICD-10-CM

## 2025-07-29 DIAGNOSIS — I48.0 PAROXYSMAL ATRIAL FIBRILLATION (HCC): ICD-10-CM

## 2025-07-29 DIAGNOSIS — M25.562 CHRONIC PAIN OF LEFT KNEE: ICD-10-CM

## 2025-07-29 DIAGNOSIS — I71.20 THORACIC AORTIC ANEURYSM WITHOUT RUPTURE, UNSPECIFIED PART (HCC): Primary | ICD-10-CM

## 2025-07-29 DIAGNOSIS — Z12.5 SCREENING PSA (PROSTATE SPECIFIC ANTIGEN): ICD-10-CM

## 2025-07-29 DIAGNOSIS — K22.70 BARRETT'S ESOPHAGUS WITHOUT DYSPLASIA: ICD-10-CM

## 2025-07-29 DIAGNOSIS — G89.29 CHRONIC PAIN OF LEFT KNEE: ICD-10-CM

## 2025-07-29 DIAGNOSIS — R73.03 PREDIABETES: ICD-10-CM

## 2025-07-29 DIAGNOSIS — E78.49 OTHER HYPERLIPIDEMIA: ICD-10-CM

## 2025-07-29 DIAGNOSIS — I10 PRIMARY HYPERTENSION: ICD-10-CM

## 2025-07-29 DIAGNOSIS — H10.32 ACUTE BACTERIAL CONJUNCTIVITIS OF LEFT EYE: ICD-10-CM

## 2025-07-29 DIAGNOSIS — Z00.00 MEDICARE ANNUAL WELLNESS VISIT, SUBSEQUENT: ICD-10-CM

## 2025-07-29 DIAGNOSIS — G47.33 OSA ON CPAP: ICD-10-CM

## 2025-07-29 PROCEDURE — 99214 OFFICE O/P EST MOD 30 MIN: CPT | Performed by: INTERNAL MEDICINE

## 2025-07-29 PROCEDURE — G2211 COMPLEX E/M VISIT ADD ON: HCPCS | Performed by: INTERNAL MEDICINE

## 2025-07-29 PROCEDURE — G0439 PPPS, SUBSEQ VISIT: HCPCS | Performed by: INTERNAL MEDICINE

## 2025-07-29 RX ORDER — OFLOXACIN 3 MG/ML
1 SOLUTION/ DROPS OPHTHALMIC 2 TIMES DAILY
COMMUNITY
Start: 2025-06-04

## 2025-07-29 RX ORDER — MOXIFLOXACIN 5 MG/ML
1 SOLUTION/ DROPS OPHTHALMIC 3 TIMES DAILY
Qty: 3 ML | Refills: 0 | Status: SHIPPED | OUTPATIENT
Start: 2025-07-29

## 2025-08-01 DIAGNOSIS — I10 PRIMARY HYPERTENSION: Primary | ICD-10-CM

## 2025-08-01 RX ORDER — VALSARTAN 320 MG/1
320 TABLET ORAL DAILY
Start: 2025-08-01

## 2025-08-01 RX ORDER — SOTALOL HYDROCHLORIDE 80 MG/1
TABLET ORAL
Start: 2025-08-01

## 2025-08-08 DIAGNOSIS — K22.710 BARRETT'S ESOPHAGUS WITH LOW GRADE DYSPLASIA: ICD-10-CM

## 2025-08-08 RX ORDER — PANTOPRAZOLE SODIUM 20 MG/1
20 TABLET, DELAYED RELEASE ORAL DAILY
Qty: 90 TABLET | Refills: 1 | Status: SHIPPED | OUTPATIENT
Start: 2025-08-08